# Patient Record
Sex: MALE | Race: BLACK OR AFRICAN AMERICAN | NOT HISPANIC OR LATINO | URBAN - METROPOLITAN AREA
[De-identification: names, ages, dates, MRNs, and addresses within clinical notes are randomized per-mention and may not be internally consistent; named-entity substitution may affect disease eponyms.]

---

## 2017-05-08 ENCOUNTER — EMERGENCY (EMERGENCY)
Facility: HOSPITAL | Age: 22
LOS: 1 days | Discharge: HOME | End: 2017-05-08
Admitting: EMERGENCY MEDICINE
Payer: MEDICAID

## 2017-05-08 VITALS
SYSTOLIC BLOOD PRESSURE: 140 MMHG | TEMPERATURE: 98 F | OXYGEN SATURATION: 100 % | HEART RATE: 52 BPM | DIASTOLIC BLOOD PRESSURE: 86 MMHG | RESPIRATION RATE: 16 BRPM

## 2017-05-08 PROCEDURE — 99283 EMERGENCY DEPT VISIT LOW MDM: CPT | Mod: 25

## 2017-05-08 RX ORDER — OXYCODONE HYDROCHLORIDE 5 MG/1
1 TABLET ORAL
Qty: 6 | Refills: 0 | OUTPATIENT
Start: 2017-05-08 | End: 2017-05-10

## 2017-05-08 NOTE — ED ADULT TRIAGE NOTE - CHIEF COMPLAINT QUOTE
pt is supposed to have root canal on right side of mouth, c.o pain to area x3 days. was going to go to dentist today but could not wait. no pmh.

## 2017-05-08 NOTE — ED PROVIDER NOTE - PLAN OF CARE
pls rest, drink plenty of fluids, take your pain meds as prescribed, do not take before driving as it will make you drowsy, f/u with the dental clinic [phone], return for any worsening symptoms or any other concerning symptoms

## 2017-05-08 NOTE — ED PROVIDER NOTE - CARE PLAN
Principal Discharge DX:	Tooth pain  Instructions for follow-up, activity and diet:	pls rest, drink plenty of fluids, take your pain meds as prescribed, do not take before driving as it will make you drowsy, f/u with the dental clinic [phone], return for any worsening symptoms or any other concerning symptoms Principal Discharge DX:	Tooth pain  Instructions for follow-up, activity and diet:	pls rest, drink plenty of fluids, take your pain meds as prescribed, do not take before driving as it will make you drowsy, f/u with the dental clinic [phone], return for any worsening symptoms or any other concerning symptoms Principal Discharge DX:	Tooth pain  Instructions for follow-up, activity and diet:	pls rest, drink plenty of fluids, take your pain meds as prescribed, do not take before driving as it will make you drowsy, f/u with the dental clinic [phone], return for any worsening symptoms or any other concerning symptoms

## 2017-05-08 NOTE — ED PROVIDER NOTE - OBJECTIVE STATEMENT
20 y/o male no pmh presents with right lower tooth pain, x 2 days, states that he was in middle of a root canal was supposed to return 1 month ago to finish, but he missed his appointment, 20 y/o male no pmh presents with right lower tooth pain, x 2 days, states that he was in middle of a root canal was supposed to return 1 month ago to finish, but he missed his appointment, and now has severe pain to tooth area denies any trauma, headaches, 20 y/o male no pmh presents with right lower tooth pain, x 2 days, states that he was in middle of a root canal was supposed to return 1 month ago to finish, but he missed his appointment, and now has severe pain to tooth area denies any trauma, headaches, neck pain, f/c/n/v/d, chest pain, sob, abdominal pain, urinary symptoms, numbness/weakness/tingling, diff swallowing/breathing, recent travel, sic contacts, social history.

## 2017-05-09 ENCOUNTER — EMERGENCY (EMERGENCY)
Facility: HOSPITAL | Age: 22
LOS: 1 days | Discharge: HOME | End: 2017-05-09
Attending: EMERGENCY MEDICINE | Admitting: EMERGENCY MEDICINE
Payer: MEDICAID

## 2017-05-09 VITALS
RESPIRATION RATE: 20 BRPM | SYSTOLIC BLOOD PRESSURE: 137 MMHG | OXYGEN SATURATION: 100 % | HEART RATE: 85 BPM | TEMPERATURE: 99 F | DIASTOLIC BLOOD PRESSURE: 79 MMHG

## 2017-05-09 PROCEDURE — 99282 EMERGENCY DEPT VISIT SF MDM: CPT

## 2017-05-09 NOTE — ED PROVIDER NOTE - MEDICAL DECISION MAKING DETAILS
Patient told he would not receive any more narcotics and to use Advil every 6 hours. Needs to follow up with dental. Patient told he would not receive any more narcotics and to use Advil every 6 hours. Needs to follow up with dental.  then walked out w/o receiving dc instructions

## 2017-05-09 NOTE — ED PROVIDER NOTE - NS ED MD SCRIBE ATTENDING SCRIBE SECTIONS
HISTORY OF PRESENT ILLNESS/HIV/VITAL SIGNS( Pullset)/REVIEW OF SYSTEMS/DISPOSITION/PHYSICAL EXAM/PAST MEDICAL/SURGICAL/SOCIAL HISTORY

## 2017-05-09 NOTE — ED PROVIDER NOTE - OBJECTIVE STATEMENT
22 y/o M pt presenting with dental pain x1 month worsened 2 days ago. Was evaluated in [facility] yesterday and prescribed Percocet. States going to the dentist today morning and was referred to another dentist. Patient returned here asking for more percocet. tooth pain for over 1 month.

## 2017-05-09 NOTE — ED ADULT TRIAGE NOTE - CHIEF COMPLAINT QUOTE
c/o toothache right side of mouth pt was treated in ER two night ago for same pain pt has follow up appointment in one week pt asking for pain meds until he sees dental specialist

## 2017-05-09 NOTE — ED PROVIDER NOTE - DETAILS:
The scribe's documentation has been prepared under my direction and personally reviewed by me in its entirety. I confirm that the note above accurately reflects all work, treatment, procedures, and medical decision making performed by Mikey Zhao MD

## 2017-08-22 ENCOUNTER — INPATIENT (INPATIENT)
Facility: HOSPITAL | Age: 22
LOS: 7 days | Discharge: HOME | End: 2017-08-30
Attending: PSYCHIATRY & NEUROLOGY | Admitting: PSYCHIATRY & NEUROLOGY
Payer: MEDICAID

## 2017-08-22 VITALS
DIASTOLIC BLOOD PRESSURE: 82 MMHG | SYSTOLIC BLOOD PRESSURE: 127 MMHG | RESPIRATION RATE: 16 BRPM | TEMPERATURE: 99 F | HEART RATE: 72 BPM

## 2017-08-22 NOTE — ED ADULT TRIAGE NOTE - CHIEF COMPLAINT QUOTE
pt brought in by family for "not acting right" for two weeks. family has seen him taking unknown pills. pt has been paranoid, agitated and confrontational. has been posting "weird stuff" on social media. pt states he smoked weed prior to arrival. denies any other drugs or alcohol. pt stating "give me some percocet, I just need percocet". pt denies any SI/HI.  NP called, pt taken to  by PES.

## 2017-08-23 DIAGNOSIS — F29 UNSPECIFIED PSYCHOSIS NOT DUE TO A SUBSTANCE OR KNOWN PHYSIOLOGICAL CONDITION: ICD-10-CM

## 2017-08-23 DIAGNOSIS — F12.10 CANNABIS ABUSE, UNCOMPLICATED: ICD-10-CM

## 2017-08-23 LAB
ALBUMIN SERPL ELPH-MCNC: 4.1 G/DL — SIGNIFICANT CHANGE UP (ref 3.3–5)
ALP SERPL-CCNC: 81 U/L — SIGNIFICANT CHANGE UP (ref 60–270)
ALT FLD-CCNC: 22 U/L — SIGNIFICANT CHANGE UP (ref 4–41)
APAP SERPL-MCNC: < 15 UG/ML — LOW (ref 15–25)
AST SERPL-CCNC: 33 U/L — SIGNIFICANT CHANGE UP (ref 4–40)
BARBITURATES MEASUREMENT: NEGATIVE — SIGNIFICANT CHANGE UP
BASOPHILS # BLD AUTO: 0.03 K/UL — SIGNIFICANT CHANGE UP (ref 0–0.2)
BASOPHILS NFR BLD AUTO: 0.5 % — SIGNIFICANT CHANGE UP (ref 0–2)
BENZODIAZ SERPL-MCNC: NEGATIVE — SIGNIFICANT CHANGE UP
BILIRUB SERPL-MCNC: 0.2 MG/DL — SIGNIFICANT CHANGE UP (ref 0.2–1.2)
BUN SERPL-MCNC: 13 MG/DL — SIGNIFICANT CHANGE UP (ref 7–23)
CALCIUM SERPL-MCNC: 9 MG/DL — SIGNIFICANT CHANGE UP (ref 8.4–10.5)
CHLORIDE SERPL-SCNC: 104 MMOL/L — SIGNIFICANT CHANGE UP (ref 98–107)
CO2 SERPL-SCNC: 25 MMOL/L — SIGNIFICANT CHANGE UP (ref 22–31)
CREAT SERPL-MCNC: 1.01 MG/DL — SIGNIFICANT CHANGE UP (ref 0.5–1.3)
EOSINOPHIL # BLD AUTO: 0.21 K/UL — SIGNIFICANT CHANGE UP (ref 0–0.5)
EOSINOPHIL NFR BLD AUTO: 3.5 % — SIGNIFICANT CHANGE UP (ref 0–6)
ETHANOL BLD-MCNC: < 10 MG/DL — SIGNIFICANT CHANGE UP
GLUCOSE SERPL-MCNC: 101 MG/DL — HIGH (ref 70–99)
HCT VFR BLD CALC: 38.9 % — LOW (ref 39–50)
HGB BLD-MCNC: 13 G/DL — SIGNIFICANT CHANGE UP (ref 13–17)
IMM GRANULOCYTES # BLD AUTO: 0.01 # — SIGNIFICANT CHANGE UP
IMM GRANULOCYTES NFR BLD AUTO: 0.2 % — SIGNIFICANT CHANGE UP (ref 0–1.5)
LYMPHOCYTES # BLD AUTO: 2.32 K/UL — SIGNIFICANT CHANGE UP (ref 1–3.3)
LYMPHOCYTES # BLD AUTO: 38.9 % — SIGNIFICANT CHANGE UP (ref 13–44)
MCHC RBC-ENTMCNC: 28.1 PG — SIGNIFICANT CHANGE UP (ref 27–34)
MCHC RBC-ENTMCNC: 33.4 % — SIGNIFICANT CHANGE UP (ref 32–36)
MCV RBC AUTO: 84.2 FL — SIGNIFICANT CHANGE UP (ref 80–100)
MONOCYTES # BLD AUTO: 0.44 K/UL — SIGNIFICANT CHANGE UP (ref 0–0.9)
MONOCYTES NFR BLD AUTO: 7.4 % — SIGNIFICANT CHANGE UP (ref 2–14)
NEUTROPHILS # BLD AUTO: 2.96 K/UL — SIGNIFICANT CHANGE UP (ref 1.8–7.4)
NEUTROPHILS NFR BLD AUTO: 49.5 % — SIGNIFICANT CHANGE UP (ref 43–77)
NRBC # FLD: 0 — SIGNIFICANT CHANGE UP
PLATELET # BLD AUTO: 187 K/UL — SIGNIFICANT CHANGE UP (ref 150–400)
PMV BLD: 10.1 FL — SIGNIFICANT CHANGE UP (ref 7–13)
POTASSIUM SERPL-MCNC: 3.8 MMOL/L — SIGNIFICANT CHANGE UP (ref 3.5–5.3)
POTASSIUM SERPL-SCNC: 3.8 MMOL/L — SIGNIFICANT CHANGE UP (ref 3.5–5.3)
PROT SERPL-MCNC: 6.5 G/DL — SIGNIFICANT CHANGE UP (ref 6–8.3)
RBC # BLD: 4.62 M/UL — SIGNIFICANT CHANGE UP (ref 4.2–5.8)
RBC # FLD: 12.7 % — SIGNIFICANT CHANGE UP (ref 10.3–14.5)
SALICYLATES SERPL-MCNC: < 5 MG/DL — LOW (ref 15–30)
SODIUM SERPL-SCNC: 141 MMOL/L — SIGNIFICANT CHANGE UP (ref 135–145)
TSH SERPL-MCNC: 0.63 UIU/ML — SIGNIFICANT CHANGE UP (ref 0.5–4.3)
WBC # BLD: 5.97 K/UL — SIGNIFICANT CHANGE UP (ref 3.8–10.5)
WBC # FLD AUTO: 5.97 K/UL — SIGNIFICANT CHANGE UP (ref 3.8–10.5)

## 2017-08-23 PROCEDURE — 99285 EMERGENCY DEPT VISIT HI MDM: CPT

## 2017-08-23 PROCEDURE — 99223 1ST HOSP IP/OBS HIGH 75: CPT

## 2017-08-23 RX ORDER — DIPHENHYDRAMINE HCL 50 MG
50 CAPSULE ORAL EVERY 6 HOURS
Qty: 0 | Refills: 0 | Status: DISCONTINUED | OUTPATIENT
Start: 2017-08-23 | End: 2017-08-30

## 2017-08-23 RX ORDER — DIPHENHYDRAMINE HCL 50 MG
50 CAPSULE ORAL ONCE
Qty: 0 | Refills: 0 | Status: DISCONTINUED | OUTPATIENT
Start: 2017-08-23 | End: 2017-08-30

## 2017-08-23 RX ORDER — HALOPERIDOL DECANOATE 100 MG/ML
5 INJECTION INTRAMUSCULAR ONCE
Qty: 0 | Refills: 0 | Status: DISCONTINUED | OUTPATIENT
Start: 2017-08-23 | End: 2017-08-30

## 2017-08-23 RX ORDER — RISPERIDONE 4 MG/1
1 TABLET ORAL DAILY
Qty: 0 | Refills: 0 | Status: DISCONTINUED | OUTPATIENT
Start: 2017-08-23 | End: 2017-08-24

## 2017-08-23 RX ORDER — HALOPERIDOL DECANOATE 100 MG/ML
5 INJECTION INTRAMUSCULAR ONCE
Qty: 0 | Refills: 0 | Status: COMPLETED | OUTPATIENT
Start: 2017-08-23 | End: 2017-08-23

## 2017-08-23 RX ORDER — HALOPERIDOL DECANOATE 100 MG/ML
5 INJECTION INTRAMUSCULAR EVERY 6 HOURS
Qty: 0 | Refills: 0 | Status: DISCONTINUED | OUTPATIENT
Start: 2017-08-23 | End: 2017-08-30

## 2017-08-23 RX ADMIN — Medication 2 MILLIGRAM(S): at 08:26

## 2017-08-23 RX ADMIN — HALOPERIDOL DECANOATE 5 MILLIGRAM(S): 100 INJECTION INTRAMUSCULAR at 08:26

## 2017-08-23 RX ADMIN — HALOPERIDOL DECANOATE 5 MILLIGRAM(S): 100 INJECTION INTRAMUSCULAR at 19:50

## 2017-08-23 RX ADMIN — HALOPERIDOL DECANOATE 5 MILLIGRAM(S): 100 INJECTION INTRAMUSCULAR at 02:45

## 2017-08-23 RX ADMIN — Medication 2 MILLIGRAM(S): at 19:50

## 2017-08-23 RX ADMIN — Medication 2 MILLIGRAM(S): at 02:45

## 2017-08-23 RX ADMIN — RISPERIDONE 1 MILLIGRAM(S): 4 TABLET ORAL at 08:21

## 2017-08-23 RX ADMIN — Medication 50 MILLIGRAM(S): at 19:50

## 2017-08-23 RX ADMIN — Medication 50 MILLIGRAM(S): at 08:26

## 2017-08-23 NOTE — ED BEHAVIORAL HEALTH ASSESSMENT NOTE - PSYCHIATRIC ISSUES AND PLAN (INCLUDE STANDING AND PRN MEDICATION)
Start Risperdal 1mg daily. May utilize PRNS: haldol 5mg, ativan 2mg, diphenhydramine 50mg, PO/IM, Q6H for Agitation

## 2017-08-23 NOTE — ED BEHAVIORAL HEALTH ASSESSMENT NOTE - CASE SUMMARY
20 y/o male with a recent stay at [facility] under unclear circumstances and without clear diagnosis, + marijuana and illicit pill abuse (assumed to be percocet but likely laced or other substance), brought in by dad and friends, presenting with bizarre behavior X2 weeks, in the context of substance abuse.    Patient with disorganized TP, delusions and paranoia, labile mood and affect with threatening behaviors in the context of psychosis. He has been exhibiting these behaviors at home for about two weeks, possibly as the result of a laced drug but a first break of psychosis cannot be ruled out at this time. Patient requires hospitalization for safety and stabilization as he is an acute risk to himself and others. Plan as above.

## 2017-08-23 NOTE — ED PROVIDER NOTE - CARE PLAN
Principal Discharge DX:	Marijuana abuse Principal Discharge DX:	Marijuana abuse  Secondary Diagnosis:	Psychosis
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
## 2017-08-23 NOTE — ED PROVIDER NOTE - OBJECTIVE STATEMENT
21M unknown PMH per triage note "pt brought in by family for "not acting right" for two weeks. family has seen him taking unknown pills. pt has been paranoid, agitated and confrontational. has been posting "weird stuff" on social media. pt states he smoked weed prior to arrival. denies any other drugs or alcohol. pt stating "give me some percocet, I just need percocet"  Pt answering "I don't know" to most questions. Not fully cooperative. Per pt, his dad drove him here bec dad thinks that pt is "laced out on drugs." Pt admits to marijuana, denies etoh or other drug use. Denies f/c, SOB/CP, cough/rhinorrhea, abd pain, NVD, SI/HI.   PEr NP rapid assessment - pt was recently dc'd from [facility].

## 2017-08-23 NOTE — ED BEHAVIORAL HEALTH ASSESSMENT NOTE - RISK ASSESSMENT
Risk factors: Single, male, psychosis with delusions and paranoia, impulsivity, threatening behaviors, substance abuse, occupational decline, absence of outpatient follow-up, limited insight into symptoms, poor reactivity to stressors, difficulty expressing thoughts/emotions, low frustration tolerance.

## 2017-08-23 NOTE — ED PROVIDER NOTE - PHYSICAL EXAMINATION
pt not compliant w/ full neuro exam (i.e. EOM).  FROM all extremities, strength 5/5  No clonus, rigidity, tremors, fasciculations. PERRL.

## 2017-08-23 NOTE — ED BEHAVIORAL HEALTH ASSESSMENT NOTE - SUMMARY
Patient is a 21 year old male, domiciled, unemployed, non-caregiver, with a recent stay at [facility] under unclear circumstances and without clear diagnosis, no current outpatient treatment, no known hx of self harm behaviors, denies prior suicide attempts, denies hx of violence or arrests, denies trauma, + marijuana and illicit pill abuse (assumed to be percocet but likely laced or other substance), with no relevant past medical history, brought in by dad and friends, presenting with bizarre behavior X2 weeks, in the context of substance abuse.    Patient with disorganized TP, delusions and paranoia, labile mood and affect with threatening behaviors in the context of psychosis. He has been exhibiting these behaviors at home for about two weeks, possibly as the result of a laced drug but a first break of psychosis cannot be ruled out at this time. Patient requires hospitalization for safety and stabilization as he is an acute risk to himself and others.

## 2017-08-23 NOTE — ED PROVIDER NOTE - PROGRESS NOTE DETAILS
labs grossly wnl, medically cleared for [facility] admission.   (had required sedation for agitation)

## 2017-08-23 NOTE — ED BEHAVIORAL HEALTH ASSESSMENT NOTE - DETAILS
[place] CPEP X2 days unable to reach at this time No physical aggression, however has been posturing and threatening for 2 weeks Deferred due to psychosis and pt's presentation LILLY Dolan Friends and father made aware

## 2017-08-23 NOTE — ED BEHAVIORAL HEALTH ASSESSMENT NOTE - OTHER
Friends Substance abuse Labile - cooperative for periods of time and then hostile/psychotic Varying - At times very soft/quiet and later loud Waxing/Waning At times loose but other times normal Yuea Patient endorsed AH to medical NP, however denied to writer. Unclear Not fully assessed, grossly normal [id number]

## 2017-08-23 NOTE — ED BEHAVIORAL HEALTH ASSESSMENT NOTE - SUICIDE RISK FACTORS
Substance abuse/dependence/Agitation/severe anxiety/Unable to engage in safety planning/Highly impulsive behavior/Access to means (pills, firearms, etc.)

## 2017-08-23 NOTE — ED BEHAVIORAL HEALTH NOTE - BEHAVIORAL HEALTH NOTE
Patient requesting to leave ER. When told that he would not be able to leave, patient got agitated, confrontational, clenching fists, clenching jaw, staring menacingly at writer and appearing threatening. Attempted to de-escalate verbally but was unsuccessful. Security and additional personnel called and patient was placed in restraints and given Ativan 2mg/Haldol 5mg IM X1 for acute agitation and psychosis. Patient requesting to leave ER. When told that he would not be able to leave, patient got agitated, confrontational, clenching fists, clenching jaw, staring menacingly at writer and appearing threatening. Attempted to de-escalate verbally but was unsuccessful. Security and additional personnel called and patient was placed in restraints and given Ativan 2mg/Haldol 5mg IM X1 for acute agitation and psychosis. Patient remained in restraints from 0242 until 0327, at which time he was calm and cooperative and no longer required restraint.

## 2017-08-24 PROCEDURE — 99233 SBSQ HOSP IP/OBS HIGH 50: CPT

## 2017-08-24 RX ORDER — BENZTROPINE MESYLATE 1 MG
1 TABLET ORAL
Qty: 0 | Refills: 0 | Status: DISCONTINUED | OUTPATIENT
Start: 2017-08-24 | End: 2017-08-30

## 2017-08-24 RX ORDER — RISPERIDONE 4 MG/1
2 TABLET ORAL
Qty: 0 | Refills: 0 | Status: DISCONTINUED | OUTPATIENT
Start: 2017-08-24 | End: 2017-08-30

## 2017-08-24 RX ADMIN — Medication 2 MILLIGRAM(S): at 16:04

## 2017-08-24 RX ADMIN — Medication 50 MILLIGRAM(S): at 21:12

## 2017-08-24 RX ADMIN — RISPERIDONE 1 MILLIGRAM(S): 4 TABLET ORAL at 08:47

## 2017-08-24 RX ADMIN — HALOPERIDOL DECANOATE 5 MILLIGRAM(S): 100 INJECTION INTRAMUSCULAR at 21:12

## 2017-08-24 RX ADMIN — RISPERIDONE 2 MILLIGRAM(S): 4 TABLET ORAL at 21:12

## 2017-08-24 RX ADMIN — Medication 1 MILLIGRAM(S): at 21:12

## 2017-08-25 PROCEDURE — 99232 SBSQ HOSP IP/OBS MODERATE 35: CPT

## 2017-08-25 RX ORDER — HALOPERIDOL DECANOATE 100 MG/ML
5 INJECTION INTRAMUSCULAR ONCE
Qty: 0 | Refills: 0 | Status: COMPLETED | OUTPATIENT
Start: 2017-08-25 | End: 2017-08-25

## 2017-08-25 RX ORDER — DIPHENHYDRAMINE HCL 50 MG
50 CAPSULE ORAL ONCE
Qty: 0 | Refills: 0 | Status: COMPLETED | OUTPATIENT
Start: 2017-08-25 | End: 2017-08-25

## 2017-08-25 RX ORDER — DIPHENHYDRAMINE HCL 50 MG
25 CAPSULE ORAL ONCE
Qty: 0 | Refills: 0 | Status: COMPLETED | OUTPATIENT
Start: 2017-08-25 | End: 2017-08-27

## 2017-08-25 RX ORDER — ACETAMINOPHEN 500 MG
650 TABLET ORAL ONCE
Qty: 0 | Refills: 0 | Status: COMPLETED | OUTPATIENT
Start: 2017-08-25 | End: 2017-08-25

## 2017-08-25 RX ADMIN — Medication 2 MILLIGRAM(S): at 11:03

## 2017-08-25 RX ADMIN — Medication 650 MILLIGRAM(S): at 23:22

## 2017-08-25 RX ADMIN — RISPERIDONE 2 MILLIGRAM(S): 4 TABLET ORAL at 11:03

## 2017-08-25 RX ADMIN — HALOPERIDOL DECANOATE 5 MILLIGRAM(S): 100 INJECTION INTRAMUSCULAR at 11:03

## 2017-08-25 RX ADMIN — HALOPERIDOL DECANOATE 5 MILLIGRAM(S): 100 INJECTION INTRAMUSCULAR at 20:00

## 2017-08-25 RX ADMIN — Medication 1 MILLIGRAM(S): at 11:03

## 2017-08-25 RX ADMIN — Medication 2 MILLIGRAM(S): at 20:00

## 2017-08-25 RX ADMIN — Medication 1 MILLIGRAM(S): at 20:28

## 2017-08-25 RX ADMIN — Medication 50 MILLIGRAM(S): at 20:00

## 2017-08-25 RX ADMIN — Medication 50 MILLIGRAM(S): at 11:03

## 2017-08-25 RX ADMIN — Medication 2 MILLIGRAM(S): at 01:35

## 2017-08-25 RX ADMIN — Medication 650 MILLIGRAM(S): at 22:30

## 2017-08-25 RX ADMIN — RISPERIDONE 2 MILLIGRAM(S): 4 TABLET ORAL at 20:28

## 2017-08-26 RX ADMIN — Medication 50 MILLIGRAM(S): at 23:00

## 2017-08-26 RX ADMIN — Medication 2 MILLIGRAM(S): at 16:41

## 2017-08-26 RX ADMIN — Medication 50 MILLIGRAM(S): at 04:10

## 2017-08-26 RX ADMIN — Medication 2 MILLIGRAM(S): at 04:10

## 2017-08-26 RX ADMIN — HALOPERIDOL DECANOATE 5 MILLIGRAM(S): 100 INJECTION INTRAMUSCULAR at 16:41

## 2017-08-26 RX ADMIN — Medication 2 MILLIGRAM(S): at 23:00

## 2017-08-26 RX ADMIN — Medication 1 MILLIGRAM(S): at 09:29

## 2017-08-26 RX ADMIN — HALOPERIDOL DECANOATE 5 MILLIGRAM(S): 100 INJECTION INTRAMUSCULAR at 23:00

## 2017-08-26 RX ADMIN — HALOPERIDOL DECANOATE 5 MILLIGRAM(S): 100 INJECTION INTRAMUSCULAR at 04:10

## 2017-08-26 RX ADMIN — Medication 1 MILLIGRAM(S): at 20:14

## 2017-08-26 RX ADMIN — RISPERIDONE 2 MILLIGRAM(S): 4 TABLET ORAL at 20:14

## 2017-08-26 RX ADMIN — Medication 50 MILLIGRAM(S): at 16:41

## 2017-08-26 RX ADMIN — RISPERIDONE 2 MILLIGRAM(S): 4 TABLET ORAL at 09:29

## 2017-08-27 RX ORDER — HALOPERIDOL DECANOATE 100 MG/ML
5 INJECTION INTRAMUSCULAR ONCE
Qty: 0 | Refills: 0 | Status: COMPLETED | OUTPATIENT
Start: 2017-08-27 | End: 2017-08-27

## 2017-08-27 RX ORDER — DIPHENHYDRAMINE HCL 50 MG
50 CAPSULE ORAL ONCE
Qty: 0 | Refills: 0 | Status: COMPLETED | OUTPATIENT
Start: 2017-08-27 | End: 2017-08-27

## 2017-08-27 RX ADMIN — Medication 2 MILLIGRAM(S): at 20:10

## 2017-08-27 RX ADMIN — HALOPERIDOL DECANOATE 5 MILLIGRAM(S): 100 INJECTION INTRAMUSCULAR at 20:10

## 2017-08-27 RX ADMIN — HALOPERIDOL DECANOATE 5 MILLIGRAM(S): 100 INJECTION INTRAMUSCULAR at 19:30

## 2017-08-27 RX ADMIN — Medication 25 MILLIGRAM(S): at 19:29

## 2017-08-27 RX ADMIN — HALOPERIDOL DECANOATE 5 MILLIGRAM(S): 100 INJECTION INTRAMUSCULAR at 08:10

## 2017-08-27 RX ADMIN — Medication 1 MILLIGRAM(S): at 21:32

## 2017-08-27 RX ADMIN — RISPERIDONE 2 MILLIGRAM(S): 4 TABLET ORAL at 21:32

## 2017-08-27 RX ADMIN — RISPERIDONE 2 MILLIGRAM(S): 4 TABLET ORAL at 09:26

## 2017-08-27 RX ADMIN — Medication 50 MILLIGRAM(S): at 08:10

## 2017-08-27 RX ADMIN — Medication 50 MILLIGRAM(S): at 20:10

## 2017-08-27 RX ADMIN — Medication 1 MILLIGRAM(S): at 09:26

## 2017-08-27 RX ADMIN — Medication 2 MILLIGRAM(S): at 08:10

## 2017-08-27 RX ADMIN — Medication 2 MILLIGRAM(S): at 19:31

## 2017-08-28 PROCEDURE — 99232 SBSQ HOSP IP/OBS MODERATE 35: CPT

## 2017-08-28 RX ADMIN — HALOPERIDOL DECANOATE 5 MILLIGRAM(S): 100 INJECTION INTRAMUSCULAR at 08:50

## 2017-08-28 RX ADMIN — Medication 1 MILLIGRAM(S): at 08:50

## 2017-08-28 RX ADMIN — HALOPERIDOL DECANOATE 5 MILLIGRAM(S): 100 INJECTION INTRAMUSCULAR at 20:25

## 2017-08-28 RX ADMIN — Medication 2 MILLIGRAM(S): at 08:50

## 2017-08-28 RX ADMIN — Medication 2 MILLIGRAM(S): at 20:25

## 2017-08-28 RX ADMIN — Medication 50 MILLIGRAM(S): at 08:50

## 2017-08-28 RX ADMIN — RISPERIDONE 2 MILLIGRAM(S): 4 TABLET ORAL at 20:25

## 2017-08-28 RX ADMIN — RISPERIDONE 2 MILLIGRAM(S): 4 TABLET ORAL at 08:50

## 2017-08-28 RX ADMIN — Medication 50 MILLIGRAM(S): at 20:25

## 2017-08-28 RX ADMIN — Medication 1 MILLIGRAM(S): at 20:25

## 2017-08-29 PROCEDURE — 99232 SBSQ HOSP IP/OBS MODERATE 35: CPT

## 2017-08-29 RX ADMIN — Medication 50 MILLIGRAM(S): at 20:13

## 2017-08-29 RX ADMIN — HALOPERIDOL DECANOATE 5 MILLIGRAM(S): 100 INJECTION INTRAMUSCULAR at 20:13

## 2017-08-29 RX ADMIN — RISPERIDONE 2 MILLIGRAM(S): 4 TABLET ORAL at 09:50

## 2017-08-29 RX ADMIN — Medication 1 MILLIGRAM(S): at 20:13

## 2017-08-29 RX ADMIN — Medication 2 MILLIGRAM(S): at 20:13

## 2017-08-29 RX ADMIN — Medication 1 MILLIGRAM(S): at 09:50

## 2017-08-29 RX ADMIN — RISPERIDONE 2 MILLIGRAM(S): 4 TABLET ORAL at 20:13

## 2017-08-30 VITALS — TEMPERATURE: 98 F

## 2017-08-30 PROCEDURE — 99238 HOSP IP/OBS DSCHRG MGMT 30/<: CPT

## 2017-08-30 RX ORDER — BENZTROPINE MESYLATE 1 MG
1 TABLET ORAL
Qty: 28 | Refills: 0 | OUTPATIENT
Start: 2017-08-30 | End: 2017-09-13

## 2017-08-30 RX ORDER — RISPERIDONE 4 MG/1
1 TABLET ORAL
Qty: 28 | Refills: 0 | OUTPATIENT
Start: 2017-08-30 | End: 2017-09-13

## 2017-08-30 RX ADMIN — Medication 1 MILLIGRAM(S): at 08:58

## 2017-08-30 RX ADMIN — RISPERIDONE 2 MILLIGRAM(S): 4 TABLET ORAL at 08:58

## 2017-09-05 ENCOUNTER — EMERGENCY (EMERGENCY)
Facility: HOSPITAL | Age: 22
LOS: 1 days | Discharge: HOME | End: 2017-09-05
Admitting: EMERGENCY MEDICINE
Payer: MEDICAID

## 2017-09-05 VITALS
DIASTOLIC BLOOD PRESSURE: 77 MMHG | SYSTOLIC BLOOD PRESSURE: 113 MMHG | TEMPERATURE: 98 F | OXYGEN SATURATION: 100 % | RESPIRATION RATE: 18 BRPM | HEART RATE: 70 BPM

## 2017-09-05 LAB
ALBUMIN SERPL ELPH-MCNC: 4 G/DL — SIGNIFICANT CHANGE UP (ref 3.3–5)
ALP SERPL-CCNC: 54 U/L — LOW (ref 60–270)
ALT FLD-CCNC: 43 U/L — HIGH (ref 4–41)
APAP SERPL-MCNC: < 15 UG/ML — LOW (ref 15–25)
AST SERPL-CCNC: 26 U/L — SIGNIFICANT CHANGE UP (ref 4–40)
BARBITURATES MEASUREMENT: NEGATIVE — SIGNIFICANT CHANGE UP
BASOPHILS # BLD AUTO: 0.02 K/UL — SIGNIFICANT CHANGE UP (ref 0–0.2)
BASOPHILS NFR BLD AUTO: 0.4 % — SIGNIFICANT CHANGE UP (ref 0–2)
BENZODIAZ SERPL-MCNC: NEGATIVE — SIGNIFICANT CHANGE UP
BILIRUB SERPL-MCNC: 0.2 MG/DL — SIGNIFICANT CHANGE UP (ref 0.2–1.2)
BUN SERPL-MCNC: 14 MG/DL — SIGNIFICANT CHANGE UP (ref 7–23)
CALCIUM SERPL-MCNC: 9.3 MG/DL — SIGNIFICANT CHANGE UP (ref 8.4–10.5)
CHLORIDE SERPL-SCNC: 104 MMOL/L — SIGNIFICANT CHANGE UP (ref 98–107)
CO2 SERPL-SCNC: 25 MMOL/L — SIGNIFICANT CHANGE UP (ref 22–31)
CREAT SERPL-MCNC: 0.92 MG/DL — SIGNIFICANT CHANGE UP (ref 0.5–1.3)
EOSINOPHIL # BLD AUTO: 0.18 K/UL — SIGNIFICANT CHANGE UP (ref 0–0.5)
EOSINOPHIL NFR BLD AUTO: 3.2 % — SIGNIFICANT CHANGE UP (ref 0–6)
ETHANOL BLD-MCNC: < 10 MG/DL — SIGNIFICANT CHANGE UP
GLUCOSE SERPL-MCNC: 82 MG/DL — SIGNIFICANT CHANGE UP (ref 70–99)
HCT VFR BLD CALC: 39.1 % — SIGNIFICANT CHANGE UP (ref 39–50)
HGB BLD-MCNC: 12.8 G/DL — LOW (ref 13–17)
IMM GRANULOCYTES # BLD AUTO: 0.01 # — SIGNIFICANT CHANGE UP
IMM GRANULOCYTES NFR BLD AUTO: 0.2 % — SIGNIFICANT CHANGE UP (ref 0–1.5)
LYMPHOCYTES # BLD AUTO: 1.95 K/UL — SIGNIFICANT CHANGE UP (ref 1–3.3)
LYMPHOCYTES # BLD AUTO: 34.9 % — SIGNIFICANT CHANGE UP (ref 13–44)
MCHC RBC-ENTMCNC: 28.1 PG — SIGNIFICANT CHANGE UP (ref 27–34)
MCHC RBC-ENTMCNC: 32.7 % — SIGNIFICANT CHANGE UP (ref 32–36)
MCV RBC AUTO: 85.9 FL — SIGNIFICANT CHANGE UP (ref 80–100)
MONOCYTES # BLD AUTO: 0.57 K/UL — SIGNIFICANT CHANGE UP (ref 0–0.9)
MONOCYTES NFR BLD AUTO: 10.2 % — SIGNIFICANT CHANGE UP (ref 2–14)
NEUTROPHILS # BLD AUTO: 2.85 K/UL — SIGNIFICANT CHANGE UP (ref 1.8–7.4)
NEUTROPHILS NFR BLD AUTO: 51.1 % — SIGNIFICANT CHANGE UP (ref 43–77)
NRBC # FLD: 0 — SIGNIFICANT CHANGE UP
PLATELET # BLD AUTO: 181 K/UL — SIGNIFICANT CHANGE UP (ref 150–400)
PMV BLD: 9.7 FL — SIGNIFICANT CHANGE UP (ref 7–13)
POTASSIUM SERPL-MCNC: 4 MMOL/L — SIGNIFICANT CHANGE UP (ref 3.5–5.3)
POTASSIUM SERPL-SCNC: 4 MMOL/L — SIGNIFICANT CHANGE UP (ref 3.5–5.3)
PROT SERPL-MCNC: 6.6 G/DL — SIGNIFICANT CHANGE UP (ref 6–8.3)
RBC # BLD: 4.55 M/UL — SIGNIFICANT CHANGE UP (ref 4.2–5.8)
RBC # FLD: 12.8 % — SIGNIFICANT CHANGE UP (ref 10.3–14.5)
SALICYLATES SERPL-MCNC: < 5 MG/DL — LOW (ref 15–30)
SODIUM SERPL-SCNC: 142 MMOL/L — SIGNIFICANT CHANGE UP (ref 135–145)
TSH SERPL-MCNC: 1.87 UIU/ML — SIGNIFICANT CHANGE UP (ref 0.5–4.3)
WBC # BLD: 5.58 K/UL — SIGNIFICANT CHANGE UP (ref 3.8–10.5)
WBC # FLD AUTO: 5.58 K/UL — SIGNIFICANT CHANGE UP (ref 3.8–10.5)

## 2017-09-05 PROCEDURE — 90792 PSYCH DIAG EVAL W/MED SRVCS: CPT

## 2017-09-05 PROCEDURE — 99285 EMERGENCY DEPT VISIT HI MDM: CPT

## 2017-09-05 RX ORDER — HALOPERIDOL DECANOATE 100 MG/ML
5 INJECTION INTRAMUSCULAR ONCE
Qty: 0 | Refills: 0 | Status: COMPLETED | OUTPATIENT
Start: 2017-09-05 | End: 2017-09-05

## 2017-09-05 RX ORDER — DIPHENHYDRAMINE HCL 50 MG
50 CAPSULE ORAL ONCE
Qty: 0 | Refills: 0 | Status: COMPLETED | OUTPATIENT
Start: 2017-09-05 | End: 2017-09-05

## 2017-09-05 RX ADMIN — Medication 50 MILLIGRAM(S): at 21:01

## 2017-09-05 RX ADMIN — HALOPERIDOL DECANOATE 5 MILLIGRAM(S): 100 INJECTION INTRAMUSCULAR at 21:01

## 2017-09-05 RX ADMIN — Medication 2 MILLIGRAM(S): at 21:01
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
## 2017-09-05 NOTE — ED BEHAVIORAL HEALTH ASSESSMENT NOTE - CASE SUMMARY
22 y/o male with a recent stay at [facility] under unclear circumstances and without clear diagnosis, + marijuana and illicit pill abuse (assumed to be percocet but likely laced or other substance), brought in by dad and friends, presenting with bizarre behavior X2 weeks, in the context of substance abuse.    Patient with disorganized TP, delusions and paranoia, labile mood and affect with threatening behaviors in the context of psychosis. He has been exhibiting these behaviors at home for about two weeks, possibly as the result of a laced drug but a first break of psychosis cannot be ruled out at this time. Patient requires hospitalization for safety and stabilization as he is an acute risk to himself and others. Plan as above. 20 y/o male with a recent admission at [facility] 08/23 - 08/30/17 with dx of Psychosis NOS, first outpatient appointment scheduled for tomorrow, chronic anger management issues, + recent arrest vs ticket for reckless driving and prior arrests for stealing, + marijuana and illicit pill abuse (assumed to be percocet but likely laced or other substance), brought in by dad, due to relapse of symptoms as a result of daily marijuana use and medication non-compliance.      Pt initially presented with disorganized and agitated behavior resulting in IM medications. After significant time in ER, pt awoke calm and cooperative, linear and logical, able to reality test that his previous paranoid and aggressive thoughts were illogical. Lengthy discussion had with patient that his marijuana use is likely exacerbating his symptoms and he will need to abstain. Pt states he now recognizes this as a trigger for his psychosis and states he will try to abstain from now on. Pt also recognizes that his medication compliance played a role in his decompensation, accepted PO dose of Risperdal 2mg. Pt motivated to attend his outpatient appointment today at [facility] and will discuss with them further medication instructions. Adelita has no acute safety concerns at this time and will pick patient up.

## 2017-09-05 NOTE — ED PROVIDER NOTE - PROGRESS NOTE DETAILS
[place]: Pt medically cleared, received s/o. Labs grossly wnl, slight LFT elevation (can be followed outpt). Awaiting  reassessment. [place]: Cleared by  for outpt pmd/psych f/u.

## 2017-09-05 NOTE — ED ADULT NURSE NOTE - OBJECTIVE STATEMENT
BIB father from home for reported "relapse" of psychosis. Father reports patient was evaluated in ED a couple weeks prior and was d/c to home, since then has been noncompliant with tx and smoking "a lot of marijuana". Received pt awake, agitated and disorganized. Pt requires frequent verbal redirection for poor boundaries and intrusive behaviors with staff. Denies s/i h/i.

## 2017-09-05 NOTE — ED BEHAVIORAL HEALTH ASSESSMENT NOTE - SUMMARY
Patient is a 21 year old male, domiciled, unemployed, non-caregiver, with a recent stay at [facility] under unclear circumstances and without clear diagnosis, no current outpatient treatment, no known hx of self harm behaviors, denies prior suicide attempts, denies hx of violence or arrests, denies trauma, + marijuana and illicit pill abuse (assumed to be percocet but likely laced or other substance), with no relevant past medical history, brought in by dad and friends, presenting with bizarre behavior X2 weeks, in the context of substance abuse.    Patient with disorganized TP, delusions and paranoia, labile mood and affect with threatening behaviors in the context of psychosis. He has been exhibiting these behaviors at home for about two weeks, possibly as the result of a laced drug but a first break of psychosis cannot be ruled out at this time. Patient requires hospitalization for safety and stabilization as he is an acute risk to himself and others. Patient is a 21 year old male, domiciled, unemployed, non-caregiver, with a recent at [facility] 08/23 - 08/30/17 with dx of Psychosis NOS, first outpatient appointment scheduled for tomorrow, no known hx of self harm behaviors, denies prior suicide attempts, chronic anger management issues, + recent arrest vs ticket for reckless driving and prior arrests for stealing, denies trauma, + marijuana and illicit pill abuse (assumed to be percocet but likely laced or other substance), with no relevant past medical history, brought in by dad, due to relapse in symptoms as a result of daily marijuana use and medication non-compliance.      Pt initially presented with disorganized and agitated behavior resulting in IM medications. After significant time in ER, pt awoke calm and cooperative, linear and logical, able to reality test that his previous paranoid and aggressive thoughts were illogical. Lengthy discussion had with patient that his marijuana use is likely exacerbating his symptoms and he will need to abstain. Pt states he now recognizes this as a trigger for his psychosis and states he will try to abstain from now on. Pt also recognizes that his medication compliance played a role in his decompensation, accepted PO dose of Risperdal 2mg. Pt motivated to attend his outpatient appointment today at [facility] and will discuss with them further medication instructions. Adelita has no acute safety concerns at this time and will pick patient up. Family and patient advised to return to ED or call 911 for any worsening symptoms or safety concerns.

## 2017-09-05 NOTE — ED PROVIDER NOTE - CHPI ED SYMPTOMS NEG
no disorientation/no homicidal/no hallucinations/no weakness/no suicidal/no weight loss/no change in level of consciousness/no confusion

## 2017-09-05 NOTE — ED BEHAVIORAL HEALTH ASSESSMENT NOTE - DETAILS
[facility] 08/23 - 08/30/17 Dr. Scotty Lepe No physical aggression, however has been posturing and threatening LILLY Dolan Friends and father made aware Father aware

## 2017-09-05 NOTE — ED BEHAVIORAL HEALTH ASSESSMENT NOTE - DESCRIPTION (FIRST USE, LAST USE, QUANTITY, FREQUENCY, DURATION)
Daily use Hx of illicit "Percocet" but unclear if that is what he actually took. Denies any recent use Concern for K2 although pt denies

## 2017-09-05 NOTE — ED BEHAVIORAL HEALTH ASSESSMENT NOTE - HPI (INCLUDE ILLNESS QUALITY, SEVERITY, DURATION, TIMING, CONTEXT, MODIFYING FACTORS, ASSOCIATED SIGNS AND SYMPTOMS)
Patient is a 21 year old male, domiciled, unemployed, non-caregiver, with a recent at [facility] 08/23 - 08/30/17 with dx of Psychosis NOS, first outpatient appointment scheduled for tomorrow, no known hx of self harm behaviors, denies prior suicide attempts, chronic anger management issues, + recent arrest vs ticket for reckless driving and prior arrests for stealing, denies trauma, + marijuana and illicit pill abuse (assumed to be percocet but likely laced or other substance), with no relevant past medical history, brought in by dad, due to relapse in symptoms as a result of daily marijuana use and medication non-compliance.      Pt was admitted to [facility] on 08/23 due to 1-2 weeks of disorganized TP, delusions and paranoia, labile mood and affect with threatening behaviors in the context psychosis. While admitted, pt was started on Risperdal m-tab and titrated to 2mg BID along with Cogentin. As per pt's discharge summary: "Father confirmed that pt has a very long history of anger issues, from childhood and adolescence. He confirmed that his low frustration tolerance and subsequent brief agitations are chronic and the pt's baseline. Pt has been through anger management treatment in the past. Father confirmed that the only new behaviors were periods of nonsensical speech and thought process as well as paranoia, which father felt had resolved by the time of family meeting. Pt's diagnosis therefore remains psychotic disorder not otherwise specified. While he certainly may have primary psychotic disorder i.e. schizophrenia, there is strong suspicion that recent substance use (pt states he got Percocets off street) may have led to psychotic symptoms and decreased ability to manage his chronic anger issues, therefore possible substance induced psychosis remains on differential. Antisocial personality disorder (hx arrests for stealing, impulsivity, irritability/aggressiveness) vs other specified disruptive, impulse control and conduct disorder (concern for Intermittent Explosive Disorder)."    Pt arrives today, irritable, intrusive with poor boundaries, agitated and disorganized. He required IM medications prior to psychiatric eval and therefore was too sedated initially to engage.     Spoke with pt's father - He reports that since discharge, pt has refused his medications and has been smoking marijuana daily, dad does not know of any further drug use like illicit pills or K2. He states that pt has started to engage in behaviors similar to his last episode - endorsing grandiose delusions of being rich and buying a BMW, going onto to [employer] and buying expensive items that he cannot afford ($500 Jayy slippers, a paint ball gun vs BB gun?). Pt has been paranoid, approaching people randomly, asking "you know me? you know my father?" and posturing at them. While pt has chronic anger issues, at baseline he does not instigate fights without some form of trigger. Pt's friends called dad and told him that pt was "harassing people" on facebook and posting more frequently than usual. Dad is concerned but willing to accept pt home if he clears from the psychosis after some time spent in ER.    Spoke with Dr. Lepe, pt's inpatient provider from . He confirms the above information that pt's baseline including some irritability and anger issues, however pt is not psychotic at baseline. There was a question of pt using K2 prior to last admission as he did clear relatively quickly, although not overnight. He agrees that if pt is not psychotic in AM but exhibits baseline irritability he would be ok for discharge, however if pt remains psychotic he will require re-admission. Patient is a 21 year old male, domiciled, unemployed, non-caregiver, with a recent at [facility] 08/23 - 08/30/17 with dx of Psychosis NOS, first outpatient appointment scheduled for tomorrow, no known hx of self harm behaviors, denies prior suicide attempts, chronic anger management issues, + recent arrest vs ticket for reckless driving and prior arrests for stealing, denies trauma, + marijuana and illicit pill abuse (assumed to be percocet but likely laced or other substance), with no relevant past medical history, brought in by dad, due to relapse in symptoms as a result of daily marijuana use and medication non-compliance.      Pt was admitted to [facility] on 08/23 due to 1-2 weeks of disorganized TP, delusions and paranoia, labile mood and affect with threatening behaviors in the context psychosis. While admitted, pt was started on Risperdal m-tab and titrated to 2mg BID along with Cogentin. As per pt's discharge summary: "Father confirmed that pt has a very long history of anger issues, from childhood and adolescence. He confirmed that his low frustration tolerance and subsequent brief agitations are chronic and the pt's baseline. Pt has been through anger management treatment in the past. Father confirmed that the only new behaviors were periods of nonsensical speech and thought process as well as paranoia, which father felt had resolved by the time of family meeting. Pt's diagnosis therefore remains psychotic disorder not otherwise specified. While he certainly may have primary psychotic disorder i.e. schizophrenia, there is strong suspicion that recent substance use (pt states he got Percocets off street) may have led to psychotic symptoms and decreased ability to manage his chronic anger issues, therefore possible substance induced psychosis remains on differential. Antisocial personality disorder (hx arrests for stealing, impulsivity, irritability/aggressiveness) vs other specified disruptive, impulse control and conduct disorder (concern for Intermittent Explosive Disorder)."    Pt arrives today, irritable, intrusive with poor boundaries, agitated and disorganized. He required IM medications prior to psychiatric eval and therefore was too sedated initially to engage. Pt was re-evaluated around 6am, about 11 hours after arrival and IM medications. Upon re-eval, pt was calm and cooperative. He admits to daily marijuana use and non-compliance with his medications resulting in paranoia toward one woman, Isabella whom he felt was responsible for his psychiatric issues. Pt states that he was previously having aggressive thoughts towards her as a result of this paranoia, however now denies feeling that way. He denies any current paranoia and is able to reality test that his previous thoughts were illogical. Also admits to difficulty controlling his anger whenever triggered since stopping his medications. Pt denies any other symptoms, denies any auditory/visual hallucinations, depression or suicidal ideations, intent or plans. Pt states he now realizes that without his medications he will likely end up back in the hospital and he wishes to avoid that. Pt accepted one dose of Risperdal 2mg PO this AM and is motivated to attend his outpatient appointment today at 230pm at [facility].     Spoke with pt's father - He reports that since discharge, pt has refused his medications and has been smoking marijuana daily, dad does not know of any further drug use like illicit pills or K2. He states that pt has started to engage in behaviors similar to his last episode - endorsing grandiose delusions of being rich and buying a BMW, going onto to [employer] and buying expensive items that he cannot afford ($500 Jayy slippers, a paint ball gun vs BB gun?). Pt has been paranoid, approaching people randomly, asking "you know me? you know my father?" and posturing at them. While pt has chronic anger issues, at baseline he does not instigate fights without some form of trigger. Pt's friends called dad and told him that pt was "harassing people" on facebook as well. Dad is concerned but willing to accept pt home if he clears from the psychosis after some time spent in ER. Update 0700- spoke with dad re: pt's presentation this AM and dad is comfortable taking patient home with plan to attend [facility] today. He has no acute safety concerns at this time.     Spoke with Dr. Lepe, pt's inpatient provider from . He confirms the above information that pt's baseline including some irritability and anger issues, however pt is not psychotic at baseline. There was a question of pt using K2 prior to last admission as he did clear relatively quickly, although not overnight. He agrees that if pt is not psychotic in AM but exhibits baseline irritability he would be ok for discharge, however if pt remains psychotic he will require re-admission.

## 2017-09-05 NOTE — ED ADULT TRIAGE NOTE - CHIEF COMPLAINT QUOTE
"I have psychosis and Paranoia." Patient's father reports that patient has not been himself, wants to harm others and has been demonstrating aggressive behaviors. Patient was discharged last week, patient is not compliant with medications, hearing voices, denies any suicidal ideations. "I have psychosis and Paranoia." Patient's father reports that patient has not been himself, wants to harm others and has been demonstrating aggressive behaviors. Patient was discharged last week, patient is not compliant with medications, hearing voices, denies any suicidal ideations. Patient smokes weeds, denies alcohol use.

## 2017-09-05 NOTE — ED BEHAVIORAL HEALTH ASSESSMENT NOTE - RISK ASSESSMENT
Risk factors: Single, male, psychosis with delusions and paranoia, impulsivity, threatening behaviors, substance abuse, occupational decline, absence of outpatient follow-up, limited insight into symptoms, poor reactivity to stressors, difficulty expressing thoughts/emotions, low frustration tolerance. Risk factors: Single, male, psychosis resulting in paranoia and aggressive ideations, impulsivity, prior hospitalization, substance abuse, limited insight into symptoms, chronic anger management issues and low frustration tolerance, and medication non- compliance.     Protective factors: Young, healthy, denies any active suicidal ideation/intent/plan, no hx of prior attempts, no self-harm behaviors, did not act on his aggressive ideations, no family hx, identifies reasons for living, future oriented, supportive social network or family, endorsing desire for future medication/follow up compliance,  no access to firearms, no hx of abuse and adequate outpatient follow up with motivation to participate in care.     Based on risk assessment evaluation, Pt does not appear to be at imminent risk of harm to self or others at this time.

## 2017-09-05 NOTE — ED ADULT NURSE NOTE - CHIEF COMPLAINT QUOTE
"I have psychosis and Paranoia." Patient's father reports that patient has not been himself, wants to harm others and has been demonstrating aggressive behaviors. Patient was discharged last week, patient is not compliant with medications, hearing voices, denies any suicidal ideations. Patient smokes weeds, denies alcohol use.

## 2017-09-05 NOTE — ED BEHAVIORAL HEALTH ASSESSMENT NOTE - OTHER
Father Substance abuse Labile - cooperative for periods of time and then hostile/psychotic Varying - At times very soft/quiet and later loud Waxing/Waning At times loose but other times normal Yuea Patient endorsed AH to medical NP, however denied to writer. Unclear Not fully assessed, grossly normal [id number] "ok" Initially irritable/agitated but upon re-eval was calm and cooperative

## 2017-09-05 NOTE — ED PROVIDER NOTE - MEDICAL DECISION MAKING DETAILS
21y Male complaining brought to ed by his father- Jerry Latif for psych eval in setting of paranoia and aggression. Pt is well appearing, no injuries on exam- labs unrevealing thus far.  Medically clear for psych dispo-.

## 2017-09-05 NOTE — ED ADULT NURSE REASSESSMENT NOTE - NS ED NURSE REASSESS COMMENT FT1
Pt received STAT Ativan 2/Haldol 5/Benadryl 50mg IM for agitation and psychosis with + effect observed. Pt is currently resting, NAD, even unlabored respirations noted. Lab results and evaluations pending. Will continue to monitor for safety.

## 2017-09-06 VITALS
OXYGEN SATURATION: 100 % | DIASTOLIC BLOOD PRESSURE: 63 MMHG | SYSTOLIC BLOOD PRESSURE: 151 MMHG | RESPIRATION RATE: 16 BRPM | HEART RATE: 78 BPM

## 2017-09-06 RX ORDER — RISPERIDONE 4 MG/1
2 TABLET ORAL ONCE
Qty: 0 | Refills: 0 | Status: COMPLETED | OUTPATIENT
Start: 2017-09-06 | End: 2017-09-06

## 2017-09-06 RX ADMIN — RISPERIDONE 2 MILLIGRAM(S): 4 TABLET ORAL at 07:03

## 2017-09-06 NOTE — ED ADULT NURSE REASSESSMENT NOTE - GENERAL PATIENT STATE
pt is awake, alert, calm and able to make needs known appropriately
pt aroused, remains drowsy s/p IM meds receievd. Awake alert to person and place. Denies s/i h/i. Vitals as stated

## 2017-09-06 NOTE — ED ADULT NURSE REASSESSMENT NOTE - STATUS
pending psych reassessment/awaiting consult
awaiting consult/pt is medically cleared, awaiting psychiatric evaluation once awake and alert s/p IM meds received

## 2017-09-07 ENCOUNTER — EMERGENCY (EMERGENCY)
Facility: HOSPITAL | Age: 22
LOS: 1 days | Discharge: HOME | End: 2017-09-07
Admitting: EMERGENCY MEDICINE
Payer: COMMERCIAL

## 2017-09-07 VITALS
SYSTOLIC BLOOD PRESSURE: 105 MMHG | DIASTOLIC BLOOD PRESSURE: 67 MMHG | TEMPERATURE: 98 F | OXYGEN SATURATION: 100 % | RESPIRATION RATE: 20 BRPM | HEART RATE: 62 BPM

## 2017-09-07 PROCEDURE — 73060 X-RAY EXAM OF HUMERUS: CPT | Mod: 26,LT

## 2017-09-07 PROCEDURE — 73070 X-RAY EXAM OF ELBOW: CPT | Mod: 26,LT

## 2017-09-07 PROCEDURE — 99284 EMERGENCY DEPT VISIT MOD MDM: CPT

## 2017-09-07 PROCEDURE — 73090 X-RAY EXAM OF FOREARM: CPT | Mod: 26,LT

## 2017-09-07 RX ORDER — ACETAMINOPHEN 500 MG
650 TABLET ORAL ONCE
Qty: 0 | Refills: 0 | Status: COMPLETED | OUTPATIENT
Start: 2017-09-07 | End: 2017-09-07

## 2017-09-07 NOTE — ED PROVIDER NOTE - CHPI ED SYMPTOMS NEG
no dizziness/no vomiting/no weakness/no fever/no chills/no nausea/no tingling/no decreased eating/drinking/no numbness
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
## 2017-09-07 NOTE — ED PROVIDER NOTE - MUSCULOSKELETAL, MLM
L arm non tender throughout, FROM, NVI, sensate intact, no e/o injury or trauma, well healed incision from prior surgery, Spine appears normal, range of motion is not limited, no muscle or joint tenderness
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
## 2017-09-07 NOTE — ED PROVIDER NOTE - CARE PLAN
Principal Discharge DX:	Arm pain Principal Discharge DX:	Arm pain  Instructions for follow-up, activity and diet:	Rest, drink plenty of fluids.  Advance activity as tolerated.  Continue all previously prescribed medications as directed. You can use motrin 600mg every 6-8 hours for pain or fever, and/or Tylenol 650 mg every 4 hours for pain/fever. Follow up with your primary care physician in 48-72 hours- bring copies of your results.  Return to the emergency department for chest pain, shortness of breath, dizziness, or worsening, concerning, or persistent symptoms.
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
## 2017-09-20 ENCOUNTER — OUTPATIENT (OUTPATIENT)
Dept: OUTPATIENT SERVICES | Facility: HOSPITAL | Age: 22
LOS: 1 days | Discharge: HOME | End: 2017-09-20
Payer: MEDICAID

## 2017-09-20 PROCEDURE — 90792 PSYCH DIAG EVAL W/MED SRVCS: CPT

## 2017-09-26 ENCOUNTER — EMERGENCY (EMERGENCY)
Facility: HOSPITAL | Age: 22
LOS: 0 days | Discharge: HOME | End: 2017-09-26
Attending: EMERGENCY MEDICINE
Payer: MEDICAID

## 2017-09-26 VITALS
DIASTOLIC BLOOD PRESSURE: 63 MMHG | WEIGHT: 143.96 LBS | SYSTOLIC BLOOD PRESSURE: 120 MMHG | HEART RATE: 74 BPM | RESPIRATION RATE: 17 BRPM | HEIGHT: 69 IN | OXYGEN SATURATION: 100 % | TEMPERATURE: 98 F
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
## 2017-09-26 DIAGNOSIS — M79.602 PAIN IN LEFT ARM: ICD-10-CM

## 2017-09-26 DIAGNOSIS — S50.12XA CONTUSION OF LEFT FOREARM, INITIAL ENCOUNTER: ICD-10-CM

## 2017-09-26 DIAGNOSIS — F29 UNSPECIFIED PSYCHOSIS NOT DUE TO A SUBSTANCE OR KNOWN PHYSIOLOGICAL CONDITION: ICD-10-CM

## 2017-09-26 PROCEDURE — 99284 EMERGENCY DEPT VISIT MOD MDM: CPT | Mod: 25

## 2017-09-26 PROCEDURE — 99053 MED SERV 10PM-8AM 24 HR FAC: CPT

## 2017-09-26 PROCEDURE — 73080 X-RAY EXAM OF ELBOW: CPT | Mod: 26,LT

## 2017-09-26 PROCEDURE — 73060 X-RAY EXAM OF HUMERUS: CPT | Mod: 26,LT

## 2017-09-26 RX ORDER — OXYCODONE HYDROCHLORIDE 5 MG/1
1 TABLET ORAL
Qty: 15 | Refills: 0 | OUTPATIENT
Start: 2017-09-26 | End: 2017-09-28

## 2017-09-26 RX ORDER — IBUPROFEN 200 MG
1 TABLET ORAL
Qty: 30 | Refills: 0 | OUTPATIENT
Start: 2017-09-26 | End: 2017-10-26

## 2017-09-26 RX ORDER — IBUPROFEN 200 MG
600 TABLET ORAL ONCE
Qty: 0 | Refills: 0 | Status: COMPLETED | OUTPATIENT
Start: 2017-09-26 | End: 2017-09-26

## 2017-09-26 RX ADMIN — Medication 600 MILLIGRAM(S): at 02:24
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
## 2017-09-26 NOTE — ED PROVIDER NOTE - PHYSICAL EXAMINATION
General:     NAD, well-nourished, well-appearing  Head:     NC/AT, EOMI, oral mucosa moist  Neck:     trachea midline  Lungs:     CTA b/l, no w/r/r  CVS:     S1S2, RRR, no m/g/r  Abd:     +BS, s/nt/nd, no organomegaly  Ext:    2+ radial and pedal pulses, no c/c/e.  L UE:  from/nt @ left shoulder and wrist.  ttp @ lateral epicondyle.  unable to fully extend elbow.  Neuro: AAOx3, no sensory/motor deficits

## 2017-10-27 PROCEDURE — 99213 OFFICE O/P EST LOW 20 MIN: CPT

## 2017-11-19 ENCOUNTER — INPATIENT (INPATIENT)
Facility: HOSPITAL | Age: 22
LOS: 4 days | Discharge: HOME | End: 2017-11-24
Attending: PSYCHIATRY & NEUROLOGY | Admitting: PSYCHIATRY & NEUROLOGY
Payer: MEDICAID

## 2017-11-19 VITALS
OXYGEN SATURATION: 100 % | SYSTOLIC BLOOD PRESSURE: 136 MMHG | RESPIRATION RATE: 15 BRPM | HEART RATE: 90 BPM | TEMPERATURE: 98 F | DIASTOLIC BLOOD PRESSURE: 86 MMHG

## 2017-11-19 DIAGNOSIS — F29 UNSPECIFIED PSYCHOSIS NOT DUE TO A SUBSTANCE OR KNOWN PHYSIOLOGICAL CONDITION: ICD-10-CM

## 2017-11-19 LAB
ALBUMIN SERPL ELPH-MCNC: 4.7 G/DL — SIGNIFICANT CHANGE UP (ref 3.3–5)
ALP SERPL-CCNC: 65 U/L — SIGNIFICANT CHANGE UP (ref 40–120)
ALT FLD-CCNC: 10 U/L — SIGNIFICANT CHANGE UP (ref 4–41)
APAP SERPL-MCNC: < 15 UG/ML — LOW (ref 15–25)
AST SERPL-CCNC: 16 U/L — SIGNIFICANT CHANGE UP (ref 4–40)
BARBITURATES MEASUREMENT: NEGATIVE — SIGNIFICANT CHANGE UP
BASOPHILS # BLD AUTO: 0.01 K/UL — SIGNIFICANT CHANGE UP (ref 0–0.2)
BASOPHILS NFR BLD AUTO: 0.2 % — SIGNIFICANT CHANGE UP (ref 0–2)
BENZODIAZ SERPL-MCNC: NEGATIVE — SIGNIFICANT CHANGE UP
BILIRUB SERPL-MCNC: 0.3 MG/DL — SIGNIFICANT CHANGE UP (ref 0.2–1.2)
BUN SERPL-MCNC: 16 MG/DL — SIGNIFICANT CHANGE UP (ref 7–23)
CALCIUM SERPL-MCNC: 8.8 MG/DL — SIGNIFICANT CHANGE UP (ref 8.4–10.5)
CHLORIDE SERPL-SCNC: 104 MMOL/L — SIGNIFICANT CHANGE UP (ref 98–107)
CO2 SERPL-SCNC: 24 MMOL/L — SIGNIFICANT CHANGE UP (ref 22–31)
CREAT SERPL-MCNC: 0.99 MG/DL — SIGNIFICANT CHANGE UP (ref 0.5–1.3)
EOSINOPHIL # BLD AUTO: 0.1 K/UL — SIGNIFICANT CHANGE UP (ref 0–0.5)
EOSINOPHIL NFR BLD AUTO: 1.7 % — SIGNIFICANT CHANGE UP (ref 0–6)
ETHANOL BLD-MCNC: < 10 MG/DL — SIGNIFICANT CHANGE UP
GLUCOSE SERPL-MCNC: 93 MG/DL — SIGNIFICANT CHANGE UP (ref 70–99)
HCT VFR BLD CALC: 41 % — SIGNIFICANT CHANGE UP (ref 39–50)
HGB BLD-MCNC: 14 G/DL — SIGNIFICANT CHANGE UP (ref 13–17)
IMM GRANULOCYTES # BLD AUTO: 0.01 # — SIGNIFICANT CHANGE UP
IMM GRANULOCYTES NFR BLD AUTO: 0.2 % — SIGNIFICANT CHANGE UP (ref 0–1.5)
LYMPHOCYTES # BLD AUTO: 1.67 K/UL — SIGNIFICANT CHANGE UP (ref 1–3.3)
LYMPHOCYTES # BLD AUTO: 28 % — SIGNIFICANT CHANGE UP (ref 13–44)
MCHC RBC-ENTMCNC: 28.7 PG — SIGNIFICANT CHANGE UP (ref 27–34)
MCHC RBC-ENTMCNC: 34.1 % — SIGNIFICANT CHANGE UP (ref 32–36)
MCV RBC AUTO: 84 FL — SIGNIFICANT CHANGE UP (ref 80–100)
MONOCYTES # BLD AUTO: 0.57 K/UL — SIGNIFICANT CHANGE UP (ref 0–0.9)
MONOCYTES NFR BLD AUTO: 9.5 % — SIGNIFICANT CHANGE UP (ref 2–14)
NEUTROPHILS # BLD AUTO: 3.61 K/UL — SIGNIFICANT CHANGE UP (ref 1.8–7.4)
NEUTROPHILS NFR BLD AUTO: 60.4 % — SIGNIFICANT CHANGE UP (ref 43–77)
NRBC # FLD: 0 — SIGNIFICANT CHANGE UP
PLATELET # BLD AUTO: 218 K/UL — SIGNIFICANT CHANGE UP (ref 150–400)
PMV BLD: 9.8 FL — SIGNIFICANT CHANGE UP (ref 7–13)
POTASSIUM SERPL-MCNC: 3.8 MMOL/L — SIGNIFICANT CHANGE UP (ref 3.5–5.3)
POTASSIUM SERPL-SCNC: 3.8 MMOL/L — SIGNIFICANT CHANGE UP (ref 3.5–5.3)
PROT SERPL-MCNC: 7.4 G/DL — SIGNIFICANT CHANGE UP (ref 6–8.3)
RBC # BLD: 4.88 M/UL — SIGNIFICANT CHANGE UP (ref 4.2–5.8)
RBC # FLD: 12.1 % — SIGNIFICANT CHANGE UP (ref 10.3–14.5)
SALICYLATES SERPL-MCNC: < 5 MG/DL — LOW (ref 15–30)
SODIUM SERPL-SCNC: 141 MMOL/L — SIGNIFICANT CHANGE UP (ref 135–145)
TSH SERPL-MCNC: 1.5 UIU/ML — SIGNIFICANT CHANGE UP (ref 0.27–4.2)
WBC # BLD: 5.97 K/UL — SIGNIFICANT CHANGE UP (ref 3.8–10.5)
WBC # FLD AUTO: 5.97 K/UL — SIGNIFICANT CHANGE UP (ref 3.8–10.5)

## 2017-11-19 PROCEDURE — 99285 EMERGENCY DEPT VISIT HI MDM: CPT

## 2017-11-19 RX ORDER — RISPERIDONE 4 MG/1
2 TABLET ORAL ONCE
Qty: 0 | Refills: 0 | Status: COMPLETED | OUTPATIENT
Start: 2017-11-19 | End: 2017-11-19

## 2017-11-19 RX ADMIN — Medication 2 MILLIGRAM(S): at 21:02

## 2017-11-19 NOTE — ED BEHAVIORAL HEALTH ASSESSMENT NOTE - SUICIDE RISK FACTORS
Unable to engage in safety planning/Agitation/severe anxiety/Access to means (pills, firearms, etc.)/Highly impulsive behavior/Substance abuse/dependence

## 2017-11-19 NOTE — ED ADULT NURSE NOTE - CHIEF COMPLAINT QUOTE
alert no distress as per father has not been himself since wed   has been depressed  crying frequently  laughing outbursts   staring   crying in triage   hx psychosis  denies drugs or alcohol  spoke linda  NP will go to

## 2017-11-19 NOTE — ED PROVIDER NOTE - OBJECTIVE STATEMENT
This is a 22 year old Male PMHX substance, psychosis BIB family for psych eval r/t medication noncompliants. Father Jerry at bedside reports his son has been crying, nonverbal for 2 days. Reports he missed his Risperidone injection two weeks ago. States he had a tooth extraction recently and was prescribed Amoxicillin and Percocet. Reports patient is very quiet and crying. Reports marijuana use.  Patient appears crying, nonverbal appears internally processing, pacing required Ativan 2mg Im x 1

## 2017-11-19 NOTE — ED BEHAVIORAL HEALTH ASSESSMENT NOTE - SUMMARY
22 year old male, domiciled, unemployed, non-caregiver, with a recent stay at [facility] from 8/23-30, history of medication noncompliance. missed his last dose of respirdone consta 25mg 2 weeks ago, history of psychotic disorder unspecified versus substance induced psychotic disorder, denies prior suicide attempts, denies hx of violence or arrests, denies trauma, + marijuana and illicit pill abuse (assumed to be percocet but likely laced or other substance), with no relevant past medical history, brought in by dad, presenting with bizarre behavior X2 weeks, in the context of substance abuse and medication noncompliance.    Patient with disorganized TP, delusions and paranoia, labile mood and affect with threatening behaviors in the context of psychosis. He has been exhibiting these behaviors at home for about two weeks, Patient requires hospitalization for safety and stabilization as he is an acute risk to himself and others.

## 2017-11-19 NOTE — ED ADULT NURSE REASSESSMENT NOTE - NS ED NURSE REASSESS COMMENT FT1
21:30-Patient presents tearful, agitated, medication given as ordered, needs met, blood work done, pt currently awaiting further MD evaluation, will continue to monitor pt.

## 2017-11-19 NOTE — ED PROVIDER NOTE - MEDICAL DECISION MAKING DETAILS
This is a 22 year old Male PMHX substance, psychosis BIB family for psych eval r/t medication noncompliants. Father Jerry at bedside reports his son has been crying, nonverbal for 2 days. Reports he missed his Risperidone injection two weeks ago. Medical evaluation performed. There is no clinical evidence of intoxication or any acute medical problem requiring immediate intervention. Final disposition will be determined by psychiatrist.

## 2017-11-19 NOTE — ED BEHAVIORAL HEALTH ASSESSMENT NOTE - OTHER
Labile - cooperative for periods of time and then hostile/psychotic Varying - At times very soft/quiet and later loud Waxing/Waning At times loose but other times normal Yuea Patient endorsed AH to medical NP, however denied to writer. Unclear father Substance abuse Not fully assessed, grossly normal

## 2017-11-19 NOTE — ED ADULT TRIAGE NOTE - CHIEF COMPLAINT QUOTE
alert no distress as per father has not been himself since wed   has been depressed  crying frequently  laughing outbursts   staring   crying in triage   hx psychosis  denies drugs or alcohol  spoke ilnda  NP will go to

## 2017-11-19 NOTE — ED BEHAVIORAL HEALTH ASSESSMENT NOTE - PSYCHIATRIC ISSUES AND PLAN (INCLUDE STANDING AND PRN MEDICATION)
Start Risperdal 2mg daily. May utilize PRNS: haldol 5mg, ativan 2mg, diphenhydramine 50mg, PO/IM, Q6H for Agitation

## 2017-11-19 NOTE — ED BEHAVIORAL HEALTH ASSESSMENT NOTE - HPI (INCLUDE ILLNESS QUALITY, SEVERITY, DURATION, TIMING, CONTEXT, MODIFYING FACTORS, ASSOCIATED SIGNS AND SYMPTOMS)
Patient is a 22 year old male, domiciled, unemployed, non-caregiver, with a recent stay at [facility] from 8/23-30, history of medication noncompliance. missed his last dose of respirdone consta 25mg 2 weeks ago, history of psychotic disorder unspecified versus substance induced psychotic disorder, denies prior suicide attempts, denies hx of violence or arrests, denies trauma, + marijuana and illicit pill abuse (assumed to be percocet but likely laced or other substance), with no relevant past medical history, brought in by dad, presenting with bizarre behavior X2 weeks, in the context of substance abuse and medication noncompliance.      Patient initially presents to ER patient noted to be crying without making any noise. He reports that he has been seeing thing and hearing his own voice all over the place. he reports that he does not want to be alive and needs to get away from his father. he reports feeling depressed. reports too much sleep, poor appetite and energy, concentration and interests. During the evaluation her appears internally preoccupied, eyes darting around the room. noncompliant with risperidone injection. reports smoking MJ daily, last a few hours ago. noted to have soft speech, thought blocking and disorganized thought process.     Spoke with pt's father, brought him to the hospital. he reports that the patient has had a sudden change in behavior for the past two weeks, with laughing inappropriately and increased agitation. He has been making paranoid statements without provocation, such as "you wanna fight" and other such statements questioning the intent of people around him. Patient has also been noted to be laughing to himself and appearing internally preoccupied at times. occasionally he will start speaking nonsensically, changing topics quickly and "speaking in circles". since having a dental procedure 3 days ago he has been taking amoxicillin and percocet and has been noted to be increasingly disorganized, with poor speech, slow movement. Patient is a 22 year old male, domiciled, unemployed, non-caregiver, with a recent stay at [facility] from 8/23-30, history of medication noncompliance. missed his last dose of respirdone consta 25mg 2 weeks ago, history of psychotic disorder unspecified versus substance induced psychotic disorder, denies prior suicide attempts, denies hx of violence or arrests, denies trauma, + marijuana and illicit pill abuse (assumed to be percocet but likely laced or other substance), with no relevant past medical history, brought in by dad, presenting with bizarre behavior X2 weeks, in the context of substance abuse and medication noncompliance. Symptoms worse over thel ast 3 days since a dental procedure when he was prescribed amoxicillin 500mg tid for 7 days (10 pills left) and percocet 5/325. father eports that the pt has been increasingly disorganized and irritable over the last 2 days.     Patient initially presents to ER patient noted to be crying without making any noise. He reports that he has been seeing thing and hearing his own voice all over the place. he reports that he does not want to be alive and needs to get away from his father. he reports feeling depressed. reports too much sleep, poor appetite and energy, concentration and interests. During the evaluation her appears internally preoccupied, eyes darting around the room. noncompliant with risperidone injection. reports smoking MJ daily, last a few hours ago. noted to have soft speech, thought blocking and disorganized thought process.     Spoke with pt's father, brought him to the hospital. he reports that the patient has had a sudden change in behavior for the past two weeks, with laughing inappropriately and increased agitation. He has been making paranoid statements without provocation, such as "you wanna fight" and other such statements questioning the intent of people around him. Patient has also been noted to be laughing to himself and appearing internally preoccupied at times. occasionally he will start speaking nonsensically, changing topics quickly and "speaking in circles". since having a dental procedure 3 days ago he has been taking amoxicillin and percocet and has been noted to be increasingly disorganized, with poor speech, slow movement.

## 2017-11-20 PROCEDURE — 99221 1ST HOSP IP/OBS SF/LOW 40: CPT

## 2017-11-20 RX ORDER — HALOPERIDOL DECANOATE 100 MG/ML
5 INJECTION INTRAMUSCULAR EVERY 6 HOURS
Qty: 0 | Refills: 0 | Status: DISCONTINUED | OUTPATIENT
Start: 2017-11-20 | End: 2017-11-24

## 2017-11-20 RX ORDER — DIPHENHYDRAMINE HCL 50 MG
50 CAPSULE ORAL ONCE
Qty: 0 | Refills: 0 | Status: DISCONTINUED | OUTPATIENT
Start: 2017-11-20 | End: 2017-11-24

## 2017-11-20 RX ORDER — RISPERIDONE 4 MG/1
2 TABLET ORAL DAILY
Qty: 0 | Refills: 0 | Status: DISCONTINUED | OUTPATIENT
Start: 2017-11-20 | End: 2017-11-20

## 2017-11-20 RX ORDER — AMOXICILLIN 250 MG/5ML
500 SUSPENSION, RECONSTITUTED, ORAL (ML) ORAL THREE TIMES A DAY
Qty: 0 | Refills: 0 | Status: DISCONTINUED | OUTPATIENT
Start: 2017-11-20 | End: 2017-11-24

## 2017-11-20 RX ORDER — RISPERIDONE 4 MG/1
1 TABLET ORAL
Qty: 0 | Refills: 0 | Status: DISCONTINUED | OUTPATIENT
Start: 2017-11-20 | End: 2017-11-22

## 2017-11-20 RX ORDER — DIPHENHYDRAMINE HCL 50 MG
50 CAPSULE ORAL EVERY 4 HOURS
Qty: 0 | Refills: 0 | Status: DISCONTINUED | OUTPATIENT
Start: 2017-11-20 | End: 2017-11-24

## 2017-11-20 RX ORDER — HALOPERIDOL DECANOATE 100 MG/ML
5 INJECTION INTRAMUSCULAR ONCE
Qty: 0 | Refills: 0 | Status: DISCONTINUED | OUTPATIENT
Start: 2017-11-20 | End: 2017-11-24

## 2017-11-20 RX ADMIN — RISPERIDONE 2 MILLIGRAM(S): 4 TABLET ORAL at 09:46

## 2017-11-20 RX ADMIN — RISPERIDONE 1 MILLIGRAM(S): 4 TABLET ORAL at 22:03

## 2017-11-20 RX ADMIN — Medication 500 MILLIGRAM(S): at 13:42

## 2017-11-20 RX ADMIN — Medication 500 MILLIGRAM(S): at 09:46

## 2017-11-20 RX ADMIN — Medication 500 MILLIGRAM(S): at 22:04

## 2017-11-21 PROCEDURE — 90853 GROUP PSYCHOTHERAPY: CPT

## 2017-11-21 RX ORDER — ACETAMINOPHEN 500 MG
650 TABLET ORAL EVERY 6 HOURS
Qty: 0 | Refills: 0 | Status: DISCONTINUED | OUTPATIENT
Start: 2017-11-21 | End: 2017-11-24

## 2017-11-21 RX ORDER — RISPERIDONE 4 MG/1
1 TABLET ORAL ONCE
Qty: 0 | Refills: 0 | Status: COMPLETED | OUTPATIENT
Start: 2017-11-21 | End: 2017-11-21

## 2017-11-21 RX ADMIN — RISPERIDONE 1 MILLIGRAM(S): 4 TABLET ORAL at 18:00

## 2017-11-21 RX ADMIN — Medication 2 MILLIGRAM(S): at 18:00

## 2017-11-21 RX ADMIN — Medication 650 MILLIGRAM(S): at 18:23

## 2017-11-21 RX ADMIN — Medication 650 MILLIGRAM(S): at 20:40

## 2017-11-21 RX ADMIN — RISPERIDONE 1 MILLIGRAM(S): 4 TABLET ORAL at 08:12

## 2017-11-21 RX ADMIN — RISPERIDONE 1 MILLIGRAM(S): 4 TABLET ORAL at 20:40

## 2017-11-21 RX ADMIN — Medication 500 MILLIGRAM(S): at 12:25

## 2017-11-21 RX ADMIN — Medication 500 MILLIGRAM(S): at 08:12

## 2017-11-22 PROCEDURE — 90853 GROUP PSYCHOTHERAPY: CPT

## 2017-11-22 PROCEDURE — 99232 SBSQ HOSP IP/OBS MODERATE 35: CPT | Mod: 25

## 2017-11-22 PROCEDURE — 93010 ELECTROCARDIOGRAM REPORT: CPT

## 2017-11-22 RX ORDER — RISPERIDONE 4 MG/1
2 TABLET ORAL AT BEDTIME
Qty: 0 | Refills: 0 | Status: COMPLETED | OUTPATIENT
Start: 2017-11-22 | End: 2017-11-22

## 2017-11-22 RX ORDER — RISPERIDONE 4 MG/1
3 TABLET ORAL AT BEDTIME
Qty: 0 | Refills: 0 | Status: DISCONTINUED | OUTPATIENT
Start: 2017-11-23 | End: 2017-11-24

## 2017-11-22 RX ADMIN — Medication 500 MILLIGRAM(S): at 14:23

## 2017-11-22 RX ADMIN — Medication 500 MILLIGRAM(S): at 08:13

## 2017-11-22 RX ADMIN — Medication 500 MILLIGRAM(S): at 20:01

## 2017-11-22 RX ADMIN — RISPERIDONE 2 MILLIGRAM(S): 4 TABLET ORAL at 20:01

## 2017-11-22 RX ADMIN — RISPERIDONE 1 MILLIGRAM(S): 4 TABLET ORAL at 08:13

## 2017-11-23 PROCEDURE — 99231 SBSQ HOSP IP/OBS SF/LOW 25: CPT

## 2017-11-23 RX ADMIN — RISPERIDONE 3 MILLIGRAM(S): 4 TABLET ORAL at 20:17

## 2017-11-23 RX ADMIN — Medication 2 MILLIGRAM(S): at 13:55

## 2017-11-24 VITALS — TEMPERATURE: 97 F

## 2017-11-24 PROCEDURE — 90853 GROUP PSYCHOTHERAPY: CPT

## 2017-11-24 PROCEDURE — 99238 HOSP IP/OBS DSCHRG MGMT 30/<: CPT | Mod: 25

## 2017-11-24 RX ORDER — RISPERIDONE 4 MG/1
25 TABLET ORAL
Qty: 0 | Refills: 0 | COMMUNITY
Start: 2017-11-24

## 2017-11-24 RX ORDER — RISPERIDONE 4 MG/1
25 TABLET ORAL
Qty: 0 | Refills: 0 | DISCHARGE
Start: 2017-11-24

## 2017-11-24 RX ORDER — RISPERIDONE 4 MG/1
1 TABLET ORAL
Qty: 21 | Refills: 0
Start: 2017-11-24 | End: 2017-12-15

## 2017-11-24 RX ORDER — RISPERIDONE 4 MG/1
25 TABLET ORAL ONCE
Qty: 0 | Refills: 0 | Status: COMPLETED | OUTPATIENT
Start: 2017-11-24 | End: 2017-11-24

## 2017-11-24 RX ORDER — BENZTROPINE MESYLATE 1 MG
1 TABLET ORAL
Qty: 60 | Refills: 0
Start: 2017-11-24 | End: 2017-12-24

## 2017-11-24 RX ADMIN — RISPERIDONE 25 MILLIGRAM(S): 4 TABLET ORAL at 13:40

## 2017-12-01 PROCEDURE — 99213 OFFICE O/P EST LOW 20 MIN: CPT
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
## 2017-12-29 PROCEDURE — 99213 OFFICE O/P EST LOW 20 MIN: CPT

## 2018-02-15 PROCEDURE — 99213 OFFICE O/P EST LOW 20 MIN: CPT

## 2018-03-04 NOTE — ED ADULT TRIAGE NOTE - MODE OF ARRIVAL
Problem: Inpatient Physical Therapy  Goal: Transfer Training Goal 2 LTG- PT  Outcome: Ongoing (interventions implemented as appropriate)   03/04/18 0958   Transfer Training 2 PT LTG   Transfer Training PT 2 LTG, Date Established 03/04/18   Transfer Training PT 2 LTG, Time to Achieve by discharge   Transfer Training PT 2 LTG, Activity Type bed to chair /chair to bed;sit to stand/stand to sit;toilet   Transfer Training PT 2 LTG, [place] Level independent   Transfer Training PT 2 LTG, Assist Device walker, rolling     Goal: Gait Training Goal LTG- PT  Outcome: Ongoing (interventions implemented as appropriate)   03/04/18 0958   Gait Training PT LTG   Gait Training Goal PT LTG, Date Established 03/04/18   Gait Training Goal PT LTG, [place] Level conditional independence   Gait Training Goal PT LTG, Assist Device walker, rolling   Gait Training Goal PT LTG, Distance to Achieve 200          EMS

## 2018-03-29 PROCEDURE — 99213 OFFICE O/P EST LOW 20 MIN: CPT

## 2018-04-13 DIAGNOSIS — F12.10 CANNABIS ABUSE, UNCOMPLICATED: ICD-10-CM

## 2018-05-10 PROCEDURE — 99213 OFFICE O/P EST LOW 20 MIN: CPT

## 2018-06-22 PROCEDURE — 99213 OFFICE O/P EST LOW 20 MIN: CPT

## 2018-09-14 PROCEDURE — 99213 OFFICE O/P EST LOW 20 MIN: CPT

## 2018-12-11 NOTE — ED ADULT TRIAGE NOTE - HEART RATE (BEATS/MIN)
Problem: Self Care Deficits Care Plan (Adult) Goal: *Therapy Goal (Edit Goal, Insert Text) STG 3: Patient will complete toileting with CGA using AE/DME as necessary within 1 week. CONTINUE GOAL (12/4/2018) LTG 3: Patient will complete toileting with modified independence using AE/DME as necessary within 2 weeks. CONTINUE GOAL (12/4/2018) Not met 12/11/2018 Outcome: Not Met 
Variance: Patient/Family Other Comments: Goal not met due to patient did not toilet during discharge evaluation. Goal: *Therapy Goal (Edit Goal, Insert Text) LTG 5: Patient will complete simple home management task with supervision, using AE/DME as necessary, within 2 weeks. CONTINUE GOAL (12/4/2018) 
 not met 12/11/2018 Outcome: Not Met 
Variance: Patient/Family Other Comments: Goal not met due to patient continues to require assist due to persistent decreased safety/balance. 90

## 2018-12-20 NOTE — ED ADULT TRIAGE NOTE - SOURCE OF INFORMATION
· The device is functioning well and is appropriately programmed.              Plan:            Transtelephonic follow-up in 3 months     Patient

## 2019-03-09 ENCOUNTER — EMERGENCY (EMERGENCY)
Facility: HOSPITAL | Age: 24
LOS: 1 days | Discharge: HOME | End: 2019-03-09
Admitting: EMERGENCY MEDICINE
Payer: MEDICAID

## 2019-03-09 VITALS
HEART RATE: 100 BPM | RESPIRATION RATE: 16 BRPM | SYSTOLIC BLOOD PRESSURE: 131 MMHG | DIASTOLIC BLOOD PRESSURE: 77 MMHG | OXYGEN SATURATION: 100 % | TEMPERATURE: 99 F

## 2019-03-09 PROCEDURE — 99283 EMERGENCY DEPT VISIT LOW MDM: CPT | Mod: 25

## 2019-03-09 NOTE — ED ADULT TRIAGE NOTE - CHIEF COMPLAINT QUOTE
Pt c/o LUE pain sp trip & fall onto it worse with movement. States sx in LUE in 2016. Denies hitting head, LOC.

## 2019-03-10 PROBLEM — F19.10 OTHER PSYCHOACTIVE SUBSTANCE ABUSE, UNCOMPLICATED: Chronic | Status: ACTIVE | Noted: 2017-11-19

## 2019-03-10 PROCEDURE — 73080 X-RAY EXAM OF ELBOW: CPT | Mod: 26,LT

## 2019-03-10 PROCEDURE — 73060 X-RAY EXAM OF HUMERUS: CPT | Mod: 26,LT

## 2019-03-10 RX ORDER — IBUPROFEN 200 MG
600 TABLET ORAL ONCE
Qty: 0 | Refills: 0 | Status: COMPLETED | OUTPATIENT
Start: 2019-03-10 | End: 2019-03-10

## 2019-03-10 RX ADMIN — Medication 600 MILLIGRAM(S): at 00:34

## 2019-03-10 NOTE — ED PROVIDER NOTE - OBJECTIVE STATEMENT
22 y/o male no pmh c/o left arm pain s/p trip and fall. Pt admits to tripping over some wires at home, landing on his left arm. now c/o pain to left elbow/upper arm. Denies head trauma or LOC. Denies numbness, tingling, weakness, fever or chills.

## 2019-04-07 ENCOUNTER — EMERGENCY (EMERGENCY)
Facility: HOSPITAL | Age: 24
LOS: 0 days | Discharge: HOME | End: 2019-04-07
Payer: MEDICAID

## 2019-04-07 VITALS
TEMPERATURE: 98 F | RESPIRATION RATE: 16 BRPM | HEART RATE: 101 BPM | HEIGHT: 68 IN | SYSTOLIC BLOOD PRESSURE: 132 MMHG | OXYGEN SATURATION: 99 % | DIASTOLIC BLOOD PRESSURE: 77 MMHG | WEIGHT: 179.9 LBS

## 2019-04-07 VITALS
HEART RATE: 75 BPM | RESPIRATION RATE: 16 BRPM | DIASTOLIC BLOOD PRESSURE: 75 MMHG | OXYGEN SATURATION: 99 % | TEMPERATURE: 98 F | SYSTOLIC BLOOD PRESSURE: 126 MMHG
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
## 2019-04-07 DIAGNOSIS — S61.411A LACERATION WITHOUT FOREIGN BODY OF RIGHT HAND, INITIAL ENCOUNTER: ICD-10-CM

## 2019-04-07 PROCEDURE — 73130 X-RAY EXAM OF HAND: CPT | Mod: 26,RT

## 2019-04-07 PROCEDURE — 99283 EMERGENCY DEPT VISIT LOW MDM: CPT | Mod: 25

## 2019-04-07 PROCEDURE — 12001 RPR S/N/AX/GEN/TRNK 2.5CM/<: CPT

## 2019-04-07 RX ORDER — ACETAMINOPHEN 500 MG
650 TABLET ORAL ONCE
Qty: 0 | Refills: 0 | Status: COMPLETED | OUTPATIENT
Start: 2019-04-07 | End: 2019-04-07

## 2019-04-07 RX ORDER — TETANUS TOXOID, REDUCED DIPHTHERIA TOXOID AND ACELLULAR PERTUSSIS VACCINE, ADSORBED 5; 2.5; 8; 8; 2.5 [IU]/.5ML; [IU]/.5ML; UG/.5ML; UG/.5ML; UG/.5ML
0.5 SUSPENSION INTRAMUSCULAR ONCE
Qty: 0 | Refills: 0 | Status: COMPLETED | OUTPATIENT
Start: 2019-04-07 | End: 2019-04-07

## 2019-04-07 RX ADMIN — Medication 1 TABLET(S): at 13:05

## 2019-04-07 RX ADMIN — Medication 650 MILLIGRAM(S): at 12:59

## 2019-04-07 RX ADMIN — Medication 650 MILLIGRAM(S): at 12:29

## 2019-04-07 RX ADMIN — TETANUS TOXOID, REDUCED DIPHTHERIA TOXOID AND ACELLULAR PERTUSSIS VACCINE, ADSORBED 0.5 MILLILITER(S): 5; 2.5; 8; 8; 2.5 SUSPENSION INTRAMUSCULAR at 12:28
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
## 2019-04-07 NOTE — ED PROVIDER NOTE - PHYSICAL EXAMINATION
Gen: Alert, NAD, well appearing  Head: NC, AT, PERRL, EOMI, normal lids/conjunctiva  ENT: B TM WNL, normal hearing, patent oropharynx without erythema/exudate, uvula midline  Neck: +supple, no tenderness/meningismus/JVD, +Trachea midline  Pulm: Bilateral BS, normal resp effort, no wheeze/stridor/retractions  CV: RRR, no M/R/G, +dist pulses  Abd: soft, NT/ND, +BS, no hepatosplenomegaly  Mskel: no edema/erythema/cyanosis, R hand/wrist without bony tenderness, fdp/fds/ed intact, str 5/5, good , sensations intact u/r/m, full rom  Skin: 2cm superifical linear lac thenar eminence, 0.5cm lac inferior to that. no redness, no arterial bleed  Neuro: AAOx3, no sensory/motor deficits, CN 2-12 intact
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
## 2019-04-07 NOTE — ED ADULT NURSE NOTE - NSIMPLEMENTINTERV_GEN_ALL_ED
Implemented All Universal Safety Interventions:  [place] to call system. Call bell, personal items and telephone within reach. Instruct patient to call for assistance. Room bathroom lighting operational. Non-slip footwear when patient is off stretcher. Physically safe environment: no spills, clutter or unnecessary equipment. Stretcher in lowest position, wheels locked, appropriate side rails in place.
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
## 2019-04-19 ENCOUNTER — EMERGENCY (EMERGENCY)
Facility: HOSPITAL | Age: 24
LOS: 0 days | Discharge: HOME | End: 2019-04-19

## 2019-04-19 VITALS
DIASTOLIC BLOOD PRESSURE: 87 MMHG | TEMPERATURE: 97 F | RESPIRATION RATE: 17 BRPM | OXYGEN SATURATION: 100 % | WEIGHT: 179.9 LBS | SYSTOLIC BLOOD PRESSURE: 138 MMHG | HEART RATE: 58 BPM

## 2019-04-19 DIAGNOSIS — F19.19 OTHER PSYCHOACTIVE SUBSTANCE ABUSE WITH UNSPECIFIED PSYCHOACTIVE SUBSTANCE-INDUCED DISORDER: ICD-10-CM

## 2019-04-19 DIAGNOSIS — Z48.02 ENCOUNTER FOR REMOVAL OF SUTURES: ICD-10-CM

## 2019-04-19 NOTE — ED PROVIDER NOTE - SKIN, MLM
Skin normal color for race, warm, dry and intact. No evidence of rash.  RIGHT PALM- 3 SUTURES, + AROM, GOOD PULSE AND GOOD SENSORY  INTACT , NO ERYTHEMA

## 2019-06-02 ENCOUNTER — EMERGENCY (EMERGENCY)
Facility: HOSPITAL | Age: 24
LOS: 0 days | Discharge: HOME | End: 2019-06-02
Attending: EMERGENCY MEDICINE
Payer: COMMERCIAL

## 2019-06-02 VITALS
HEART RATE: 64 BPM | TEMPERATURE: 98 F | SYSTOLIC BLOOD PRESSURE: 91 MMHG | DIASTOLIC BLOOD PRESSURE: 52 MMHG | WEIGHT: 179.9 LBS | RESPIRATION RATE: 18 BRPM | HEIGHT: 69 IN | OXYGEN SATURATION: 99 %

## 2019-06-02 DIAGNOSIS — S39.012A STRAIN OF MUSCLE, FASCIA AND TENDON OF LOWER BACK, INITIAL ENCOUNTER: ICD-10-CM
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
## 2019-06-02 DIAGNOSIS — M54.5 LOW BACK PAIN: ICD-10-CM
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
## 2019-06-02 PROCEDURE — 99283 EMERGENCY DEPT VISIT LOW MDM: CPT | Mod: 25

## 2019-06-02 PROCEDURE — 99053 MED SERV 10PM-8AM 24 HR FAC: CPT

## 2019-06-02 PROCEDURE — 72100 X-RAY EXAM L-S SPINE 2/3 VWS: CPT | Mod: 26

## 2019-06-02 RX ORDER — KETOROLAC TROMETHAMINE 30 MG/ML
60 SYRINGE (ML) INJECTION ONCE
Refills: 0 | Status: DISCONTINUED | OUTPATIENT
Start: 2019-06-02 | End: 2019-06-02

## 2019-06-02 RX ADMIN — Medication 60 MILLIGRAM(S): at 02:21

## 2019-06-02 RX ADMIN — Medication 60 MILLIGRAM(S): at 02:10
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
## 2019-06-02 NOTE — ED ADULT NURSE NOTE - NSIMPLEMENTINTERV_GEN_ALL_ED
Implemented All Universal Safety Interventions:  [place] to call system. Call bell, personal items and telephone within reach. Instruct patient to call for assistance. Room bathroom lighting operational. Non-slip footwear when patient is off stretcher. Physically safe environment: no spills, clutter or unnecessary equipment. Stretcher in lowest position, wheels locked, appropriate side rails in place.

## 2019-06-02 NOTE — ED PROVIDER NOTE - NSFOLLOWUPINSTRUCTIONS_ED_ALL_ED_FT
Take the PAIN MEDICINE as needed for symptoms. Take the PAIN MEDICINE as needed for symptoms.    You will likely experience more soreness tomorrow morning.
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
## 2019-07-30 ENCOUNTER — EMERGENCY (EMERGENCY)
Facility: HOSPITAL | Age: 24
LOS: 1 days | Discharge: HOME | End: 2019-07-30
Attending: EMERGENCY MEDICINE | Admitting: STUDENT IN AN ORGANIZED HEALTH CARE EDUCATION/TRAINING PROGRAM
Payer: MEDICAID

## 2019-07-30 VITALS
SYSTOLIC BLOOD PRESSURE: 134 MMHG | TEMPERATURE: 99 F | RESPIRATION RATE: 18 BRPM | OXYGEN SATURATION: 100 % | DIASTOLIC BLOOD PRESSURE: 80 MMHG | HEART RATE: 99 BPM

## 2019-07-30 DIAGNOSIS — F43.24 ADJUSTMENT DISORDER WITH DISTURBANCE OF CONDUCT: ICD-10-CM

## 2019-07-30 DIAGNOSIS — R69 ILLNESS, UNSPECIFIED: ICD-10-CM

## 2019-07-30 DIAGNOSIS — F19.10 OTHER PSYCHOACTIVE SUBSTANCE ABUSE, UNCOMPLICATED: ICD-10-CM

## 2019-07-30 LAB
ALBUMIN SERPL ELPH-MCNC: 4.8 G/DL — SIGNIFICANT CHANGE UP (ref 3.3–5)
ALP SERPL-CCNC: 56 U/L — SIGNIFICANT CHANGE UP (ref 40–120)
ALT FLD-CCNC: 20 U/L — SIGNIFICANT CHANGE UP (ref 4–41)
AMPHET UR-MCNC: NEGATIVE — SIGNIFICANT CHANGE UP
ANION GAP SERPL CALC-SCNC: 11 MMO/L — SIGNIFICANT CHANGE UP (ref 7–14)
APAP SERPL-MCNC: < 15 UG/ML — LOW (ref 15–25)
APPEARANCE UR: CLEAR — SIGNIFICANT CHANGE UP
AST SERPL-CCNC: 34 U/L — SIGNIFICANT CHANGE UP (ref 4–40)
BACTERIA # UR AUTO: SIGNIFICANT CHANGE UP
BARBITURATES UR SCN-MCNC: NEGATIVE — SIGNIFICANT CHANGE UP
BASOPHILS # BLD AUTO: 0.02 K/UL — SIGNIFICANT CHANGE UP (ref 0–0.2)
BASOPHILS NFR BLD AUTO: 0.2 % — SIGNIFICANT CHANGE UP (ref 0–2)
BENZODIAZ UR-MCNC: NEGATIVE — SIGNIFICANT CHANGE UP
BILIRUB SERPL-MCNC: 1.4 MG/DL — HIGH (ref 0.2–1.2)
BILIRUB UR-MCNC: NEGATIVE — SIGNIFICANT CHANGE UP
BLOOD UR QL VISUAL: NEGATIVE — SIGNIFICANT CHANGE UP
BUN SERPL-MCNC: 10 MG/DL — SIGNIFICANT CHANGE UP (ref 7–23)
CALCIUM SERPL-MCNC: 9.6 MG/DL — SIGNIFICANT CHANGE UP (ref 8.4–10.5)
CANNABINOIDS UR-MCNC: POSITIVE — SIGNIFICANT CHANGE UP
CHLORIDE SERPL-SCNC: 101 MMOL/L — SIGNIFICANT CHANGE UP (ref 98–107)
CO2 SERPL-SCNC: 27 MMOL/L — SIGNIFICANT CHANGE UP (ref 22–31)
COCAINE METAB.OTHER UR-MCNC: POSITIVE — SIGNIFICANT CHANGE UP
COLOR SPEC: YELLOW — SIGNIFICANT CHANGE UP
CREAT SERPL-MCNC: 1.33 MG/DL — HIGH (ref 0.5–1.3)
EOSINOPHIL # BLD AUTO: 0.07 K/UL — SIGNIFICANT CHANGE UP (ref 0–0.5)
EOSINOPHIL NFR BLD AUTO: 0.8 % — SIGNIFICANT CHANGE UP (ref 0–6)
ETHANOL BLD-MCNC: < 10 MG/DL — SIGNIFICANT CHANGE UP
GLUCOSE SERPL-MCNC: 77 MG/DL — SIGNIFICANT CHANGE UP (ref 70–99)
GLUCOSE UR-MCNC: NEGATIVE — SIGNIFICANT CHANGE UP
HCT VFR BLD CALC: 43.5 % — SIGNIFICANT CHANGE UP (ref 39–50)
HGB BLD-MCNC: 14.1 G/DL — SIGNIFICANT CHANGE UP (ref 13–17)
HYALINE CASTS # UR AUTO: SIGNIFICANT CHANGE UP
IMM GRANULOCYTES NFR BLD AUTO: 0.2 % — SIGNIFICANT CHANGE UP (ref 0–1.5)
KETONES UR-MCNC: NEGATIVE — SIGNIFICANT CHANGE UP
LEUKOCYTE ESTERASE UR-ACNC: NEGATIVE — SIGNIFICANT CHANGE UP
LYMPHOCYTES # BLD AUTO: 1.19 K/UL — SIGNIFICANT CHANGE UP (ref 1–3.3)
LYMPHOCYTES # BLD AUTO: 13.8 % — SIGNIFICANT CHANGE UP (ref 13–44)
MCHC RBC-ENTMCNC: 27.7 PG — SIGNIFICANT CHANGE UP (ref 27–34)
MCHC RBC-ENTMCNC: 32.4 % — SIGNIFICANT CHANGE UP (ref 32–36)
MCV RBC AUTO: 85.5 FL — SIGNIFICANT CHANGE UP (ref 80–100)
METHADONE UR-MCNC: NEGATIVE — SIGNIFICANT CHANGE UP
MONOCYTES # BLD AUTO: 0.85 K/UL — SIGNIFICANT CHANGE UP (ref 0–0.9)
MONOCYTES NFR BLD AUTO: 9.8 % — SIGNIFICANT CHANGE UP (ref 2–14)
NEUTROPHILS # BLD AUTO: 6.49 K/UL — SIGNIFICANT CHANGE UP (ref 1.8–7.4)
NEUTROPHILS NFR BLD AUTO: 75.2 % — SIGNIFICANT CHANGE UP (ref 43–77)
NITRITE UR-MCNC: NEGATIVE — SIGNIFICANT CHANGE UP
NRBC # FLD: 0 K/UL — SIGNIFICANT CHANGE UP (ref 0–0)
OPIATES UR-MCNC: NEGATIVE — SIGNIFICANT CHANGE UP
OXYCODONE UR-MCNC: NEGATIVE — SIGNIFICANT CHANGE UP
PCP UR-MCNC: NEGATIVE — SIGNIFICANT CHANGE UP
PH UR: 6.5 — SIGNIFICANT CHANGE UP (ref 5–8)
PLATELET # BLD AUTO: 190 K/UL — SIGNIFICANT CHANGE UP (ref 150–400)
PMV BLD: 9.3 FL — SIGNIFICANT CHANGE UP (ref 7–13)
POTASSIUM SERPL-MCNC: 3.6 MMOL/L — SIGNIFICANT CHANGE UP (ref 3.5–5.3)
POTASSIUM SERPL-SCNC: 3.6 MMOL/L — SIGNIFICANT CHANGE UP (ref 3.5–5.3)
PROT SERPL-MCNC: 7.4 G/DL — SIGNIFICANT CHANGE UP (ref 6–8.3)
PROT UR-MCNC: 50 — SIGNIFICANT CHANGE UP
RBC # BLD: 5.09 M/UL — SIGNIFICANT CHANGE UP (ref 4.2–5.8)
RBC # FLD: 12.4 % — SIGNIFICANT CHANGE UP (ref 10.3–14.5)
RBC CASTS # UR COMP ASSIST: SIGNIFICANT CHANGE UP (ref 0–?)
SALICYLATES SERPL-MCNC: < 5 MG/DL — LOW (ref 15–30)
SODIUM SERPL-SCNC: 139 MMOL/L — SIGNIFICANT CHANGE UP (ref 135–145)
SP GR SPEC: 1.03 — SIGNIFICANT CHANGE UP (ref 1–1.04)
SQUAMOUS # UR AUTO: SIGNIFICANT CHANGE UP
TSH SERPL-MCNC: 0.53 UIU/ML — SIGNIFICANT CHANGE UP (ref 0.27–4.2)
UROBILINOGEN FLD QL: SIGNIFICANT CHANGE UP
WBC # BLD: 8.64 K/UL — SIGNIFICANT CHANGE UP (ref 3.8–10.5)
WBC # FLD AUTO: 8.64 K/UL — SIGNIFICANT CHANGE UP (ref 3.8–10.5)
WBC UR QL: HIGH (ref 0–?)

## 2019-07-30 PROCEDURE — 90792 PSYCH DIAG EVAL W/MED SRVCS: CPT

## 2019-07-30 PROCEDURE — 99283 EMERGENCY DEPT VISIT LOW MDM: CPT

## 2019-07-30 PROCEDURE — 70450 CT HEAD/BRAIN W/O DYE: CPT | Mod: 26

## 2019-07-30 PROCEDURE — 70486 CT MAXILLOFACIAL W/O DYE: CPT | Mod: 26

## 2019-07-30 RX ORDER — BACITRACIN ZINC 500 UNIT/G
1 OINTMENT IN PACKET (EA) TOPICAL ONCE
Refills: 0 | Status: COMPLETED | OUTPATIENT
Start: 2019-07-30 | End: 2019-07-30

## 2019-07-30 RX ORDER — TETANUS TOXOID, REDUCED DIPHTHERIA TOXOID AND ACELLULAR PERTUSSIS VACCINE, ADSORBED 5; 2.5; 8; 8; 2.5 [IU]/.5ML; [IU]/.5ML; UG/.5ML; UG/.5ML; UG/.5ML
0.5 SUSPENSION INTRAMUSCULAR ONCE
Refills: 0 | Status: COMPLETED | OUTPATIENT
Start: 2019-07-30 | End: 2019-07-30

## 2019-07-30 RX ADMIN — TETANUS TOXOID, REDUCED DIPHTHERIA TOXOID AND ACELLULAR PERTUSSIS VACCINE, ADSORBED 0.5 MILLILITER(S): 5; 2.5; 8; 8; 2.5 SUSPENSION INTRAMUSCULAR at 14:40

## 2019-07-30 RX ADMIN — Medication 1 APPLICATION(S): at 14:40

## 2019-07-30 NOTE — ED PROVIDER NOTE - PROGRESS NOTE DETAILS
Aarti Perea M.D: pt signed out to me pending CT head, which is now performed and negative for acute pathology. has been cleared by psych for discharge with outpatient follow up. stable for dc at this time.
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
## 2019-07-30 NOTE — ED PROVIDER NOTE - CARE PLAN
Principal Discharge DX:	Adjustment disorder with disturbance of conduct  Secondary Diagnosis:	Closed head injury

## 2019-07-30 NOTE — ED BEHAVIORAL HEALTH ASSESSMENT NOTE - REFERRAL / APPOINTMENT DETAILS
pt provided with information for [facility]; pt declines referrals for substance abuse treatment
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
## 2019-07-30 NOTE — ED BEHAVIORAL HEALTH ASSESSMENT NOTE - RISK ASSESSMENT
Risk factors: impulsivity, substance abuse, h/o psychosis, medication noncompliant, limited insight into symptoms.  Protective factors include no suicide attempts, no violence history, no access to guns, no global insomnia, supportive family, no suicidal ideation or homicidal ideation.   Pt is not at acutely elevated risk of harm to self or others.
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
## 2019-07-30 NOTE — ED PROVIDER NOTE - NSFOLLOWUPINSTRUCTIONS_ED_ALL_ED_FT
Closed Head Injury    A closed head injury is an injury to your head that may or may not involve a traumatic brain injury (TBI). Symptoms of TBI can be short or long lasting and include headache, dizziness, interference with memory or speech, fatigue, confusion, changes in sleep, mood changes, nausea, depression/anxiety, and dulling of senses. Make sure to obtain proper rest which includes getting plenty of sleep, avoiding excessive visual stimulation, and avoiding activities that may cause physical or mental stress. Avoid any situation where there is potential for another head injury, including sports.    SEEK IMMEDIATE MEDICAL CARE IF YOU HAVE ANY OF THE FOLLOWING SYMPTOMS: unusual drowsiness, vomiting, severe dizziness, seizures, lightheadedness, muscular weakness, different pupil sizes, visual changes, or clear or bloody discharge from your ears or nose.     Anxiety    Generalized anxiety disorder (CARLA) is a mental disorder. It is defined as anxiety that is not necessarily related to specific events or activities or is out of proportion to said events. Symptoms include restlessness, fatigue, difficulty concentrations, irritability and difficulty concentrating. It may interfere with life functions, including relationships, work, and school. If you were started on a medication, make sure to take exactly as prescribed and follow up with a psychiatrist.    SEEK IMMEDIATE MEDICAL CARE IF YOU HAVE ANY OF THE FOLLOWING SYMPTOMS: thoughts about hurting killing yourself, thoughts about hurting or killing somebody else, hallucinations, or worsening depression.
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
## 2019-07-30 NOTE — ED BEHAVIORAL HEALTH NOTE - BEHAVIORAL HEALTH NOTE
C-SSRS Screener     1. Have you ever wished to be dead or wished you could go to sleep and not wake up?  [  ]Yes, [ x ]No, [  ]Unable to Assess  Details _____________________________     2. Have you actually had any thoughts of killing yourself?   [  ]Yes, [ x ]No, [  ]Unable to Assess  Details _____________________________     If answer is “No” for 1 and 2, stop here. If answer is “Yes” to 1 or 2, proceed to 3.     3. Have you been thinking about how you might kill yourself?  [  ]Yes, [  ]No, [  ]Unable to Assess  Details _____________________________     4. Have you had these thoughts and had some intention of acting on them?  [  ]Yes, [  ]No, [  ]Unable to Assess  Details _____________________________     5. Have you started to work out or worked out the details of how to kill yourself? Do you intend to carry out this plan?  [  ]Yes, [  ]No, [  ]Unable to Assess  Details _____________________________     6. Have you ever done anything, started to do anything, or prepared to do anything to end your life? If so, was it in the past 3 months?  [  ]Yes, [  ]No, [  ]Unable to Assess  Details _____________________________        Additional Suicide Risk Factors (select all that apply)  [  ]Access to lethal means including firearms  [  ]Family history of suicide  [  ]Impulsivity  [  ] Current or past mood disorder  [  ] Current or past psychotic disorder  [  ] Current or past PTSD  [  ] Current or past ADHD  [  ] Current or past TBI  [  ] Current or past cluster B personality disorder or traits  [  ] Current or past conduct problems  [  ] Recent onset of current or past psychiatric disorder  [  ] Family history of psychiatric diagnoses requiring hospitalization     Additional Activating Events (select all that apply)  [  ]Perceived burden on family or others  [  ]Current sexual or physical abuse  [  ]Substance intoxication or withdrawal  [  ]Inadequate social supports  [  ]Hopeless about or dissatisfied with current provider or treatment     Additional Protective Factors (select all that apply)  [  ] Future plans  [  ] Alevism beliefs  [  ] Beloved pets

## 2019-07-30 NOTE — ED BEHAVIORAL HEALTH ASSESSMENT NOTE - DIFFERENTIAL
(0) swallows foods/liquids without difficulty Substance induced psychosis vs Schizophrenia vs Schizoaffective d/o vs Bipolar d/o

## 2019-08-02 ENCOUNTER — EMERGENCY (EMERGENCY)
Facility: HOSPITAL | Age: 24
LOS: 1 days | Discharge: HOME | End: 2019-08-02
Admitting: EMERGENCY MEDICINE
Payer: MEDICAID

## 2019-08-02 ENCOUNTER — OUTPATIENT (OUTPATIENT)
Dept: OUTPATIENT SERVICES | Facility: HOSPITAL | Age: 24
LOS: 1 days | Discharge: HOME | End: 2019-08-02

## 2019-08-02 VITALS
HEART RATE: 57 BPM | RESPIRATION RATE: 16 BRPM | DIASTOLIC BLOOD PRESSURE: 72 MMHG | TEMPERATURE: 98 F | OXYGEN SATURATION: 100 % | SYSTOLIC BLOOD PRESSURE: 128 MMHG

## 2019-08-02 PROCEDURE — 99283 EMERGENCY DEPT VISIT LOW MDM: CPT

## 2019-08-02 RX ORDER — BACITRACIN ZINC 500 UNIT/G
1 OINTMENT IN PACKET (EA) TOPICAL ONCE
Refills: 0 | Status: DISCONTINUED | OUTPATIENT
Start: 2019-08-02 | End: 2019-08-09

## 2019-08-02 NOTE — ED ADULT TRIAGE NOTE - CHIEF COMPLAINT QUOTE
A&Ox3 arrived via ems and police pt was aggressive and agitated at home as per pt fathers, pt recently d/c from [facility] and medications were adjusted, pt noted to have dressing on left knee dry and intact PMh schizophrenia bipolar,  Dee durand called

## 2019-08-02 NOTE — ED ADULT NURSE NOTE - OBJECTIVE STATEMENT
Received pt in  pt calm & cooperative bought in by EMS for aggression & agitation, pt denies si/hi/avh presently  safety & comfort measures maintained eval on going.
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
## 2019-08-02 NOTE — ED BEHAVIORAL HEALTH NOTE - BEHAVIORAL HEALTH NOTE
Writer was informed patient was medically cleared and required outpatient detox resources.  Writer met with patient who states he used to attend [facility] and will return there.  Patient was provided with a list of resources which he accepted. Elor was informed patient was medically cleared and required outpatient detox resources.   met with patient who states he used to attend [facility] and will return there.  Patient was provided with a list of resources which he accepted.   provided nursing ametrocard #[id number]

## 2019-08-02 NOTE — ED ADULT NURSE REASSESSMENT NOTE - NS ED NURSE REASSESS COMMENT FT1
pt calm & cooperative d/c by NP pt denies si/hi/avh presently resources provided to pt upon d/c pt verbalizing understanding.

## 2019-08-02 NOTE — ED PROVIDER NOTE - NSFOLLOWUPCLINICS_GEN_ALL_ED_FT
[facility]  Behavioral Health  [address] [place] [address]  Phone: [phone]  Fax:   Follow Up Time:

## 2019-08-02 NOTE — ED PROVIDER NOTE - CLINICAL SUMMARY MEDICAL DECISION MAKING FREE TEXT BOX
24 y/o M hx Marijuana Abuse  Medical evaluation performed. There is no clinical evidence of intoxication or any acute medical problem requiring immediate intervention. 22 y/o M hx Marijuana Abuse  Old Abrasion to left knee, right hand and forehead . Bacitracin applied, Tetanus up to date.     Medical evaluation performed. There is no clinical evidence of intoxication or any acute medical problem requiring immediate intervention. Recommend following up with [facility].

## 2019-08-06 ENCOUNTER — EMERGENCY (EMERGENCY)
Facility: HOSPITAL | Age: 24
LOS: 1 days | Discharge: HOME | End: 2019-08-06
Attending: EMERGENCY MEDICINE | Admitting: EMERGENCY MEDICINE
Payer: MEDICAID

## 2019-08-06 VITALS
DIASTOLIC BLOOD PRESSURE: 82 MMHG | TEMPERATURE: 98 F | OXYGEN SATURATION: 100 % | SYSTOLIC BLOOD PRESSURE: 125 MMHG | RESPIRATION RATE: 16 BRPM | HEART RATE: 53 BPM

## 2019-08-06 PROCEDURE — 99283 EMERGENCY DEPT VISIT LOW MDM: CPT

## 2019-08-06 PROCEDURE — 90792 PSYCH DIAG EVAL W/MED SRVCS: CPT

## 2019-08-06 NOTE — ED PROVIDER NOTE - PROGRESS NOTE DETAILS
Long discussion with collateral - Jerry (patients father).  Concerned about his substance use and acting out behaviors. Denies any appearance of speaking to self or others that are not there.  No reported hallucinations.  Not concerned for suicidal or homicidal behaviors.  Explained at length that the patient needs to want to get involved in rehab or detox and that this does not meet criteria for an emergency psychiatric admission.  Will also have BH attending call to confirm no other findings that would require admission

## 2019-08-06 NOTE — ED BEHAVIORAL HEALTH ASSESSMENT NOTE - DESCRIPTION (FIRST USE, LAST USE, QUANTITY, FREQUENCY, DURATION)
see HPI Illicit "Percocet" but unclear if this is what patient was actually buying, father also reports noticing his oxycodone missing father suspects recent xanax abuse, patient denies
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
## 2019-08-06 NOTE — ED PROVIDER NOTE - PHYSICAL EXAMINATION
Gen: Well appearing in NAD  Head: NC/AT  Neck: trachea midline  Resp:  No distress  Ext: no deformities  Neuro:  A&O appears non focal  Skin:  Warm and dry as visualized  Psych:  Normal affect and mood no SI HI AH VH good insight

## 2019-08-06 NOTE — ED PROVIDER NOTE - NSFOLLOWUPINSTRUCTIONS_ED_ALL_ED_FT
1. TAKE ALL MEDICATIONS AS DIRECTED.    2. FOR PAIN OR FEVER YOU CAN TAKE IBUPROFEN (MOTRIN, ADVIL) OR ACETAMINOPHEN (TYLENOL) AS NEEDED, AS DIRECTED ON PACKAGING.  3. FOLLOW UP WITH YOUR PRIMARY DOCTOR WITHIN 5 DAYS AS DIRECTED.  4. IF YOU HAD LABS OR IMAGING DONE, YOU WERE GIVEN COPIES OF ALL LABS AND/OR IMAGING RESULTS FROM YOUR ER VISIT--PLEASE TAKE THEM WITH YOU TO YOUR FOLLOW UP APPOINTMENTS.  5. IF NEEDED, CALL PATIENT ACCESS SERVICES AT [phone] TO FIND A PRIMARY CARE PHYSICIAN.  OR CALL [phone] TO MAKE AN APPOINTMENT WITH THE CLINIC.  6. RETURN TO THE ER FOR ANY WORSENING SYMPTOMS OR CONCERNS.
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
## 2019-08-06 NOTE — ED PROVIDER NOTE - CLINICAL SUMMARY MEDICAL DECISION MAKING FREE TEXT BOX
pt with aggressive destructive behavior when denied access to illicit substances.  Seems to be a recurrent problem based on charts from previous visit 2 days ago.  Does not seem to be criteria for involuntary admission.  Will have BH see patient as well to confirm.  Pt with good insight
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
## 2019-09-01 ENCOUNTER — OUTPATIENT (OUTPATIENT)
Dept: OUTPATIENT SERVICES | Facility: HOSPITAL | Age: 24
LOS: 1 days | End: 2019-09-01
Payer: MEDICAID

## 2019-09-12 DIAGNOSIS — Z71.89 OTHER SPECIFIED COUNSELING: ICD-10-CM

## 2019-09-19 DIAGNOSIS — Z76.89 PERSONS ENCOUNTERING HEALTH SERVICES IN OTHER SPECIFIED CIRCUMSTANCES: ICD-10-CM

## 2019-12-06 ENCOUNTER — EMERGENCY (EMERGENCY)
Facility: HOSPITAL | Age: 24
LOS: 0 days | Discharge: HOME | End: 2019-12-06
Attending: EMERGENCY MEDICINE
Payer: COMMERCIAL

## 2019-12-06 VITALS
TEMPERATURE: 98 F | SYSTOLIC BLOOD PRESSURE: 140 MMHG | HEART RATE: 62 BPM | RESPIRATION RATE: 17 BRPM | DIASTOLIC BLOOD PRESSURE: 78 MMHG | WEIGHT: 160.06 LBS | HEIGHT: 68 IN

## 2019-12-06 DIAGNOSIS — M54.5 LOW BACK PAIN: ICD-10-CM

## 2019-12-06 DIAGNOSIS — M54.2 CERVICALGIA: ICD-10-CM

## 2019-12-06 DIAGNOSIS — M25.519 PAIN IN UNSPECIFIED SHOULDER: ICD-10-CM
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
## 2019-12-06 PROCEDURE — 72125 CT NECK SPINE W/O DYE: CPT | Mod: 26

## 2019-12-06 PROCEDURE — 72131 CT LUMBAR SPINE W/O DYE: CPT | Mod: 26

## 2019-12-06 PROCEDURE — 99284 EMERGENCY DEPT VISIT MOD MDM: CPT

## 2019-12-06 PROCEDURE — 76377 3D RENDER W/INTRP POSTPROCES: CPT | Mod: 26,76

## 2019-12-06 RX ORDER — KETOROLAC TROMETHAMINE 30 MG/ML
60 SYRINGE (ML) INJECTION ONCE
Refills: 0 | Status: DISCONTINUED | OUTPATIENT
Start: 2019-12-06 | End: 2019-12-06

## 2019-12-06 RX ORDER — HYDROMORPHONE HYDROCHLORIDE 2 MG/ML
1 INJECTION INTRAMUSCULAR; INTRAVENOUS; SUBCUTANEOUS ONCE
Refills: 0 | Status: DISCONTINUED | OUTPATIENT
Start: 2019-12-06 | End: 2019-12-06

## 2019-12-06 RX ORDER — MORPHINE SULFATE 50 MG/1
4 CAPSULE, EXTENDED RELEASE ORAL ONCE
Refills: 0 | Status: DISCONTINUED | OUTPATIENT
Start: 2019-12-06 | End: 2019-12-06

## 2019-12-06 NOTE — ED ADULT NURSE NOTE - NSIMPLEMENTINTERV_GEN_ALL_ED
Implemented All Fall Risk Interventions:  [place] to call system. Call bell, personal items and telephone within reach. Instruct patient to call for assistance. Room bathroom lighting operational. Non-slip footwear when patient is off stretcher. Physically safe environment: no spills, clutter or unnecessary equipment. Stretcher in lowest position, wheels locked, appropriate side rails in place. Provide visual cue, wrist band, yellow gown, etc. Monitor gait and stability. Monitor for mental status changes and reorient to person, place, and time. Review medications for side effects contributing to fall risk. Reinforce activity limits and safety measures with patient and family.

## 2019-12-06 NOTE — ED PROVIDER NOTE - PATIENT PORTAL LINK FT
You can access the FollowMyHealth Patient Portal offered by [facility] by registering at the following website: [email or web address]. By joining [facility]’s FollowMyHealth portal, you will also be able to view your health information using other applications (apps) compatible with our system.
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
## 2019-12-06 NOTE — ED PROVIDER NOTE - CARE PROVIDER_API CALL
Maru Kennedy (DO)  Orthopaedic Surgery Surgery  [address], [place] [address]  Phone: [phone]  Fax: [phone]  Follow Up Time:

## 2020-02-01 PROCEDURE — G9005: CPT

## 2020-02-08 ENCOUNTER — EMERGENCY (EMERGENCY)
Facility: HOSPITAL | Age: 25
LOS: 1 days | Discharge: HOME | End: 2020-02-08
Attending: EMERGENCY MEDICINE | Admitting: EMERGENCY MEDICINE
Payer: MEDICAID

## 2020-02-08 VITALS
TEMPERATURE: 99 F | RESPIRATION RATE: 16 BRPM | OXYGEN SATURATION: 100 % | HEART RATE: 54 BPM | DIASTOLIC BLOOD PRESSURE: 78 MMHG | SYSTOLIC BLOOD PRESSURE: 126 MMHG

## 2020-02-08 VITALS
OXYGEN SATURATION: 99 % | TEMPERATURE: 98 F | SYSTOLIC BLOOD PRESSURE: 131 MMHG | DIASTOLIC BLOOD PRESSURE: 68 MMHG | RESPIRATION RATE: 16 BRPM | HEART RATE: 55 BPM

## 2020-02-08 LAB
AMPHET UR-MCNC: NEGATIVE — SIGNIFICANT CHANGE UP
APPEARANCE UR: CLEAR — SIGNIFICANT CHANGE UP
BACTERIA # UR AUTO: SIGNIFICANT CHANGE UP
BARBITURATES UR SCN-MCNC: NEGATIVE — SIGNIFICANT CHANGE UP
BENZODIAZ UR-MCNC: NEGATIVE — SIGNIFICANT CHANGE UP
BILIRUB UR-MCNC: NEGATIVE — SIGNIFICANT CHANGE UP
BLOOD UR QL VISUAL: NEGATIVE — SIGNIFICANT CHANGE UP
CANNABINOIDS UR-MCNC: POSITIVE — SIGNIFICANT CHANGE UP
COCAINE METAB.OTHER UR-MCNC: NEGATIVE — SIGNIFICANT CHANGE UP
COLOR SPEC: YELLOW — SIGNIFICANT CHANGE UP
GLUCOSE UR-MCNC: NEGATIVE — SIGNIFICANT CHANGE UP
HYALINE CASTS # UR AUTO: NEGATIVE — SIGNIFICANT CHANGE UP
KETONES UR-MCNC: HIGH
LEUKOCYTE ESTERASE UR-ACNC: NEGATIVE — SIGNIFICANT CHANGE UP
METHADONE UR-MCNC: NEGATIVE — SIGNIFICANT CHANGE UP
MUCOUS THREADS # UR AUTO: SIGNIFICANT CHANGE UP
NITRITE UR-MCNC: NEGATIVE — SIGNIFICANT CHANGE UP
OPIATES UR-MCNC: NEGATIVE — SIGNIFICANT CHANGE UP
OXYCODONE UR-MCNC: NEGATIVE — SIGNIFICANT CHANGE UP
PCP UR-MCNC: NEGATIVE — SIGNIFICANT CHANGE UP
PH UR: 6 — SIGNIFICANT CHANGE UP (ref 5–8)
PROT UR-MCNC: 30 — SIGNIFICANT CHANGE UP
RBC CASTS # UR COMP ASSIST: SIGNIFICANT CHANGE UP (ref 0–?)
SP GR SPEC: 1.03 — SIGNIFICANT CHANGE UP (ref 1–1.04)
SQUAMOUS # UR AUTO: SIGNIFICANT CHANGE UP
UROBILINOGEN FLD QL: NORMAL — SIGNIFICANT CHANGE UP
WBC UR QL: SIGNIFICANT CHANGE UP (ref 0–?)

## 2020-02-08 PROCEDURE — 99283 EMERGENCY DEPT VISIT LOW MDM: CPT

## 2020-02-08 PROCEDURE — 90792 PSYCH DIAG EVAL W/MED SRVCS: CPT
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
## 2020-02-08 NOTE — ED BEHAVIORAL HEALTH ASSESSMENT NOTE - RISK ASSESSMENT
Risk factors: impulsivity, substance abuse, h/o psychosis, medication noncompliant, limited insight into symptoms.  Protective factors include no suicide attempts, no violence history, no access to guns, no global insomnia, supportive family, no suicidal ideation or homicidal ideation.   Pt is not at acutely elevated risk of harm to self or others. Low Acute Suicide Risk

## 2020-02-08 NOTE — ED ADULT NURSE REASSESSMENT NOTE - NS ED NURSE REASSESS COMMENT FT1
Break Coverage RN: Pt discharged. Pt calm and cooperative at this time. Denies SI/HI/A/V hallucinations at this time. Pt given metrocard by SW for transport home. NAD at this time. Belongings returned to pt.
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
## 2020-02-08 NOTE — ED BEHAVIORAL HEALTH NOTE - BEHAVIORAL HEALTH NOTE
Worker was provided with metro card and shelter referrals for patient's travel. Patient is cleared psychiatrically for discharge.
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
## 2020-02-08 NOTE — ED BEHAVIORAL HEALTH ASSESSMENT NOTE - REFERRAL / APPOINTMENT DETAILS
pt again provided with information for [facility]; pt declines referrals for substance abuse treatment
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
## 2020-02-08 NOTE — ED BEHAVIORAL HEALTH ASSESSMENT NOTE - SUICIDE PROTECTIVE FACTORS
Has future plans/Supportive social network of family or friends/Engaged in work or school/Identifies reasons for living Has future plans/Supportive social network of family or friends/Identifies reasons for living
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
## 2020-02-08 NOTE — ED BEHAVIORAL HEALTH ASSESSMENT NOTE - NS ED BHA PLAN TR BH CONTACTED YN
General Scheduling Information  To schedule your CT/MRI scan, please contact [place] at [phone]   [address] [place] [address]    To schedule your Surgery, please contact our Specialty Schedulers at [phone]    ENT Clinic Locations Clinic Hours Telephone Number     Laura Anand  [address] [place] [address]   Tuesday:       8:00am -- 4:00pm    Wednesday:  8:00am - 4:00pm   To schedule an appointment with   Dr. Valles,   please contact our   Specialty Scheduling Department at:     [phone]       Laura Wall  [address] [place] [address]   Friday:          8:00am - 4:00pm         Urgent Care Locations Clinic Hours Telephone Numbers     Laura Parnell  [address] [place] [address]     Monday-Friday:     11:00pm - 9:00pm    Saturday-Sunday:  9:00am - 5:00pm   [phone]     Laura Wall  [address] [place] [address]     Monday-Friday:      5:00pm - 9:00pm     Saturday-Sunday:  9:00am - 5:00pm   [phone]       
No

## 2020-02-08 NOTE — ED PROVIDER NOTE - PATIENT PORTAL LINK FT
You can access the FollowMyHealth Patient Portal offered by [facility] by registering at the following website: [email or web address]. By joining [employer]’s FollowMyHealth portal, you will also be able to view your health information using other applications (apps) compatible with our system.
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
## 2020-02-08 NOTE — ED BEHAVIORAL HEALTH ASSESSMENT NOTE - DIFFERENTIAL
Substance induced psychosis vs Schizophrenia vs Schizoaffective d/o vs Bipolar d/o    r/o antisocial PD

## 2020-02-22 ENCOUNTER — EMERGENCY (EMERGENCY)
Facility: HOSPITAL | Age: 25
LOS: 1 days | Discharge: HOME | End: 2020-02-22
Admitting: EMERGENCY MEDICINE
Payer: MEDICAID

## 2020-02-22 VITALS
TEMPERATURE: 99 F | HEART RATE: 68 BPM | DIASTOLIC BLOOD PRESSURE: 74 MMHG | SYSTOLIC BLOOD PRESSURE: 122 MMHG | OXYGEN SATURATION: 100 % | RESPIRATION RATE: 16 BRPM

## 2020-02-22 PROCEDURE — 90792 PSYCH DIAG EVAL W/MED SRVCS: CPT | Mod: GC

## 2020-02-22 PROCEDURE — 99283 EMERGENCY DEPT VISIT LOW MDM: CPT
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
## 2020-02-22 NOTE — ED PROVIDER NOTE - PROGRESS NOTE DETAILS
MORENA: I was signed out this pt pending PSYCH consult. Psych saw pt and spoke to father. Pt is treat and release, they do not think he requires inpt psych hospitalization. Pt calm, cooperative. Will discharge home to father.
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
## 2020-02-22 NOTE — ED BEHAVIORAL HEALTH ASSESSMENT NOTE - RISK ASSESSMENT
Risk factors: impulsivity, substance abuse, h/o psychosis, medication noncompliant, limited insight into symptoms.  Protective factors include no suicide attempts, no violence history, no access to guns, no global insomnia, supportive family, no suicidal ideation or homicidal ideation.   Pt is not at acutely elevated risk of harm to self or others. Low Acute Suicide Risk Risk factors: impulsivity, substance abuse, h/o psychosis, medication noncompliant, limited insight into symptoms. Protective factors include no suicide attempts, no access to guns, no global insomnia, supportive family, no suicidal ideation or homicidal ideation. Pt is not at imminently elevated risk of harm to self or others and does not meet criteria for involuntary psychiatric admission.

## 2020-02-22 NOTE — ED BEHAVIORAL HEALTH ASSESSMENT NOTE - SUICIDE RISK FACTORS
Alcohol/Substance abuse disorders/Agitation/Severe Anxiety/Panic Psychotic disorder current/past/Agitation/Severe Anxiety/Panic/Alcohol/Substance abuse disorders

## 2020-02-22 NOTE — ED BEHAVIORAL HEALTH ASSESSMENT NOTE - VIOLENCE RISK FACTORS:
Antisocial behavior/cognition (past or present)/Substance abuse Antisocial behavior/cognition (past or present)/Violent ideation/threat/speech/Substance abuse/Noncompliance with treatment

## 2020-02-22 NOTE — ED BEHAVIORAL HEALTH ASSESSMENT NOTE - SAFETY PLAN DETAILS
call 911 or return to ED if symptoms worsen or if pt develops thoughts of harm self or others call 911 or return to ED if symptoms worsen or if pt develops thoughts of harm self or others; provided with information regarding shelters and patient stated that he would be spending the night with a cousin in the area
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
## 2020-02-22 NOTE — ED BEHAVIORAL HEALTH ASSESSMENT NOTE - OTHER
father Substance abuse superficially cooperative call [phone] if you need to speak with someone 24/7
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
## 2020-02-22 NOTE — ED PROVIDER NOTE - PATIENT PORTAL LINK FT
You can access the FollowMyHealth Patient Portal offered by [facility] by registering at the following website: [email or web address]. By joining [employer]’s FollowMyHealth portal, you will also be able to view your health information using other applications (apps) compatible with our system.

## 2020-02-22 NOTE — ED PROVIDER NOTE - NSFOLLOWUPINSTRUCTIONS_ED_ALL_ED_FT
Please follow up with your primary care doctor after you leave the emergency department so that they can follow up and conduct more testing and treatment as they deem necessary. If you have worsening signs or symptoms of what you came in to the Emergency Department today and are not able to see your doctor, go to your nearest emergency department or return to the [facility] emergency department for further care and management.

## 2020-02-22 NOTE — ED ADULT NURSE NOTE - NSIMPLEMENTINTERV_GEN_ALL_ED
Implemented All Universal Safety Interventions:  [place] to call system. Call bell, personal items and telephone within reach. Instruct patient to call for assistance. Room bathroom lighting operational. Non-slip footwear when patient is off stretcher. Physically safe environment: no spills, clutter or unnecessary equipment. Stretcher in lowest position, wheels locked, appropriate side rails in place.
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
## 2020-02-22 NOTE — ED BEHAVIORAL HEALTH ASSESSMENT NOTE - REFERRAL / APPOINTMENT DETAILS
pt again provided with information for [facility]; pt declines referrals for substance abuse treatment
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
## 2020-02-22 NOTE — ED BEHAVIORAL HEALTH ASSESSMENT NOTE - DESCRIPTION (FIRST USE, LAST USE, QUANTITY, FREQUENCY, DURATION)
see HPI Illicit "Percocet" but unclear if this is what patient was actually buying, father also reports noticing his oxycodone missing father suspects recent xanax abuse, patient denies see HPI, endorses daily use 1-2 joints/day per chart review

## 2020-02-23 NOTE — PROVIDER CONTACT NOTE (OTHER) - ASSESSMENT
IRVING contacted by JUAN Herrera for [employer] for pt being discharged home. IRVING provided RN with [email or web address] Serial Number [id number].

## 2020-06-01 ENCOUNTER — OUTPATIENT (OUTPATIENT)
Dept: OUTPATIENT SERVICES | Facility: HOSPITAL | Age: 25
LOS: 1 days | End: 2020-06-01
Payer: MEDICAID

## 2020-06-04 ENCOUNTER — EMERGENCY (EMERGENCY)
Facility: HOSPITAL | Age: 25
LOS: 1 days | Discharge: PSYCHIATRIC OR SUBSTANCE USE FACILITY | End: 2020-06-04
Admitting: EMERGENCY MEDICINE
Payer: MEDICAID

## 2020-06-04 VITALS
SYSTOLIC BLOOD PRESSURE: 108 MMHG | DIASTOLIC BLOOD PRESSURE: 72 MMHG | OXYGEN SATURATION: 100 % | RESPIRATION RATE: 18 BRPM | TEMPERATURE: 98 F | HEART RATE: 77 BPM

## 2020-06-04 VITALS
SYSTOLIC BLOOD PRESSURE: 101 MMHG | HEART RATE: 54 BPM | RESPIRATION RATE: 17 BRPM | DIASTOLIC BLOOD PRESSURE: 59 MMHG | OXYGEN SATURATION: 100 % | TEMPERATURE: 98 F

## 2020-06-04 DIAGNOSIS — F12.10 CANNABIS ABUSE, UNCOMPLICATED: ICD-10-CM

## 2020-06-04 DIAGNOSIS — F39 UNSPECIFIED MOOD [AFFECTIVE] DISORDER: ICD-10-CM

## 2020-06-04 LAB
ALBUMIN SERPL ELPH-MCNC: 4.5 G/DL — SIGNIFICANT CHANGE UP (ref 3.3–5)
ALP SERPL-CCNC: 51 U/L — SIGNIFICANT CHANGE UP (ref 40–120)
ALT FLD-CCNC: 8 U/L — SIGNIFICANT CHANGE UP (ref 4–41)
AMPHET UR-MCNC: NEGATIVE — SIGNIFICANT CHANGE UP
ANION GAP SERPL CALC-SCNC: 12 MMO/L — SIGNIFICANT CHANGE UP (ref 7–14)
APAP SERPL-MCNC: < 15 UG/ML — LOW (ref 15–25)
APPEARANCE UR: CLEAR — SIGNIFICANT CHANGE UP
AST SERPL-CCNC: 14 U/L — SIGNIFICANT CHANGE UP (ref 4–40)
BACTERIA # UR AUTO: NEGATIVE — SIGNIFICANT CHANGE UP
BARBITURATES UR SCN-MCNC: NEGATIVE — SIGNIFICANT CHANGE UP
BASOPHILS # BLD AUTO: 0.03 K/UL — SIGNIFICANT CHANGE UP (ref 0–0.2)
BASOPHILS NFR BLD AUTO: 0.4 % — SIGNIFICANT CHANGE UP (ref 0–2)
BENZODIAZ UR-MCNC: NEGATIVE — SIGNIFICANT CHANGE UP
BILIRUB SERPL-MCNC: 0.3 MG/DL — SIGNIFICANT CHANGE UP (ref 0.2–1.2)
BILIRUB UR-MCNC: NEGATIVE — SIGNIFICANT CHANGE UP
BLOOD UR QL VISUAL: NEGATIVE — SIGNIFICANT CHANGE UP
BUN SERPL-MCNC: 13 MG/DL — SIGNIFICANT CHANGE UP (ref 7–23)
CALCIUM SERPL-MCNC: 9.4 MG/DL — SIGNIFICANT CHANGE UP (ref 8.4–10.5)
CANNABINOIDS UR-MCNC: POSITIVE — SIGNIFICANT CHANGE UP
CHLORIDE SERPL-SCNC: 105 MMOL/L — SIGNIFICANT CHANGE UP (ref 98–107)
CO2 SERPL-SCNC: 25 MMOL/L — SIGNIFICANT CHANGE UP (ref 22–31)
COCAINE METAB.OTHER UR-MCNC: NEGATIVE — SIGNIFICANT CHANGE UP
COLOR SPEC: YELLOW — SIGNIFICANT CHANGE UP
CREAT SERPL-MCNC: 1.15 MG/DL — SIGNIFICANT CHANGE UP (ref 0.5–1.3)
EOSINOPHIL # BLD AUTO: 0.1 K/UL — SIGNIFICANT CHANGE UP (ref 0–0.5)
EOSINOPHIL NFR BLD AUTO: 1.4 % — SIGNIFICANT CHANGE UP (ref 0–6)
ETHANOL BLD-MCNC: < 10 MG/DL — SIGNIFICANT CHANGE UP
GLUCOSE SERPL-MCNC: 88 MG/DL — SIGNIFICANT CHANGE UP (ref 70–99)
GLUCOSE UR-MCNC: NEGATIVE — SIGNIFICANT CHANGE UP
HCT VFR BLD CALC: 41.1 % — SIGNIFICANT CHANGE UP (ref 39–50)
HGB BLD-MCNC: 13.5 G/DL — SIGNIFICANT CHANGE UP (ref 13–17)
HYALINE CASTS # UR AUTO: HIGH
IMM GRANULOCYTES NFR BLD AUTO: 0.1 % — SIGNIFICANT CHANGE UP (ref 0–1.5)
KETONES UR-MCNC: SIGNIFICANT CHANGE UP
LEUKOCYTE ESTERASE UR-ACNC: NEGATIVE — SIGNIFICANT CHANGE UP
LYMPHOCYTES # BLD AUTO: 2.04 K/UL — SIGNIFICANT CHANGE UP (ref 1–3.3)
LYMPHOCYTES # BLD AUTO: 28.8 % — SIGNIFICANT CHANGE UP (ref 13–44)
MCHC RBC-ENTMCNC: 28.2 PG — SIGNIFICANT CHANGE UP (ref 27–34)
MCHC RBC-ENTMCNC: 32.8 % — SIGNIFICANT CHANGE UP (ref 32–36)
MCV RBC AUTO: 86 FL — SIGNIFICANT CHANGE UP (ref 80–100)
METHADONE UR-MCNC: NEGATIVE — SIGNIFICANT CHANGE UP
MONOCYTES # BLD AUTO: 0.52 K/UL — SIGNIFICANT CHANGE UP (ref 0–0.9)
MONOCYTES NFR BLD AUTO: 7.3 % — SIGNIFICANT CHANGE UP (ref 2–14)
NEUTROPHILS # BLD AUTO: 4.39 K/UL — SIGNIFICANT CHANGE UP (ref 1.8–7.4)
NEUTROPHILS NFR BLD AUTO: 62 % — SIGNIFICANT CHANGE UP (ref 43–77)
NITRITE UR-MCNC: NEGATIVE — SIGNIFICANT CHANGE UP
NRBC # FLD: 0 K/UL — SIGNIFICANT CHANGE UP (ref 0–0)
OPIATES UR-MCNC: NEGATIVE — SIGNIFICANT CHANGE UP
OXYCODONE UR-MCNC: NEGATIVE — SIGNIFICANT CHANGE UP
PCP UR-MCNC: NEGATIVE — SIGNIFICANT CHANGE UP
PH UR: 6.5 — SIGNIFICANT CHANGE UP (ref 5–8)
PLATELET # BLD AUTO: 249 K/UL — SIGNIFICANT CHANGE UP (ref 150–400)
PMV BLD: 9.8 FL — SIGNIFICANT CHANGE UP (ref 7–13)
POTASSIUM SERPL-MCNC: 3.6 MMOL/L — SIGNIFICANT CHANGE UP (ref 3.5–5.3)
POTASSIUM SERPL-SCNC: 3.6 MMOL/L — SIGNIFICANT CHANGE UP (ref 3.5–5.3)
PROT SERPL-MCNC: 6.7 G/DL — SIGNIFICANT CHANGE UP (ref 6–8.3)
PROT UR-MCNC: 70 — SIGNIFICANT CHANGE UP
RBC # BLD: 4.78 M/UL — SIGNIFICANT CHANGE UP (ref 4.2–5.8)
RBC # FLD: 12.9 % — SIGNIFICANT CHANGE UP (ref 10.3–14.5)
RBC CASTS # UR COMP ASSIST: SIGNIFICANT CHANGE UP (ref 0–?)
SALICYLATES SERPL-MCNC: < 5 MG/DL — LOW (ref 15–30)
SARS-COV-2 RNA SPEC QL NAA+PROBE: SIGNIFICANT CHANGE UP
SODIUM SERPL-SCNC: 142 MMOL/L — SIGNIFICANT CHANGE UP (ref 135–145)
SP GR SPEC: 1.03 — SIGNIFICANT CHANGE UP (ref 1–1.04)
SQUAMOUS # UR AUTO: SIGNIFICANT CHANGE UP
TSH SERPL-MCNC: 1.31 UIU/ML — SIGNIFICANT CHANGE UP (ref 0.27–4.2)
UROBILINOGEN FLD QL: SIGNIFICANT CHANGE UP
WBC # BLD: 7.09 K/UL — SIGNIFICANT CHANGE UP (ref 3.8–10.5)
WBC # FLD AUTO: 7.09 K/UL — SIGNIFICANT CHANGE UP (ref 3.8–10.5)
WBC UR QL: SIGNIFICANT CHANGE UP (ref 0–?)

## 2020-06-04 PROCEDURE — 99285 EMERGENCY DEPT VISIT HI MDM: CPT | Mod: 25

## 2020-06-04 PROCEDURE — 93010 ELECTROCARDIOGRAM REPORT: CPT

## 2020-06-04 PROCEDURE — 99285 EMERGENCY DEPT VISIT HI MDM: CPT

## 2020-06-04 NOTE — ED BEHAVIORAL HEALTH ASSESSMENT NOTE - RISK ASSESSMENT
Risk factors: impulsivity, substance abuse, h/o psychosis, medication noncompliant, limited insight into symptoms. Protective factors include no suicide attempts, no access to guns, no global insomnia, supportive family, no suicidal ideation or homicidal ideation. Pt is not at imminently elevated risk of harm to self or others and does not meet criteria for involuntary psychiatric admission. Low Acute Suicide Risk Risk factors: impulsivity, substance abuse, psychosis, medication noncompliant, aggressive/violent, history of inpatient admissions, recent inpatient discharge, substance use, history of legal issues, suspected ASPD and poor judgement/insight.

## 2020-06-04 NOTE — ED BEHAVIORAL HEALTH NOTE - BEHAVIORAL HEALTH NOTE
Faxed patient's EKG, COVID results and lab results to [facility] ([phone]) for further review for admission.

## 2020-06-04 NOTE — ED BEHAVIORAL HEALTH ASSESSMENT NOTE - PSYCHIATRIC ISSUES AND PLAN (INCLUDE STANDING AND PRN MEDICATION)
Consider starting Antipsychotic and convert to [place]- defer to inpatient team; Haldol 5mg PO/IM PRN, Ativan 2mg PO/IM PRN, Benadryl 50mg Po/IM PRN Consider starting Antipsychotic and convert to [place]- defer to inpatient team; Pending QTC -Haldol 5mg PO/IM PRN, Ativan 2mg PO/IM PRN, Benadryl 50mg Po/IM PRN
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
## 2020-06-04 NOTE — ED BEHAVIORAL HEALTH ASSESSMENT NOTE - OTHER PAST PSYCHIATRIC HISTORY (INCLUDE DETAILS REGARDING ONSET, COURSE OF ILLNESS, INPATIENT/OUTPATIENT TREATMENT)
PHx of various diagnoses listed on Psyckes Bipolar 1, Schizoaffective disorder, Unspecified Psychosis, Conduct Disorder, cannabis abuse, r/o substance-induced psychosis, multiple past psych hospitalizations, most recently at [facility] 2/28-3/11 2020, chronic history of medication noncompliance, was on [place] after [place] (eJan Pierre Benavides), no history of self-injurious behavior or suicide attempts. Chronic non compliance. PPHx of various diagnoses listed on Psyckes Bipolar 1, Schizoaffective disorder, Unspecified Psychosis, Conduct Disorder, cannabis abuse, r/o substance-induced psychosis, multiple past psych hospitalizations, most recently at [facility] 2/28-3/11 2020, chronic history of medication noncompliance, was on [place] after [place] (Jean Pierre Benavides), no history of self-injurious behavior or suicide attempts. Chronic non compliance.

## 2020-06-04 NOTE — ED BEHAVIORAL HEALTH NOTE - BEHAVIORAL HEALTH NOTE
Worker called [facility] ([phone]) and spoke to Juana who took patient information and states that chart will be reviewed. Pending EKG.
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
## 2020-06-04 NOTE — ED BEHAVIORAL HEALTH ASSESSMENT NOTE - SAFETY PLAN DETAILS
call 911 or return to ED if symptoms worsen or if pt develops thoughts of harm self or others; provided with information regarding shelters and patient stated that he would be spending the night with a cousin in the area
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
## 2020-06-04 NOTE — ED ADULT NURSE NOTE - NS ED NURSE AMBULANCES2
6:10- temp now 98.6. Pt is well appearing, father has no new complaints and able to walk with normal gait. Pt is stable for discharge and follow up with medical doctor. Pt educated on care and need for follow up. Discussed anticipatory guidance and return precautions. Questions answered. I had a detailed discussion with the patient and/or guardian regarding the historical points, exam findings, and any diagnostic results supporting the discharge diagnosis. [facility]

## 2020-06-04 NOTE — ED ADULT NURSE NOTE - NSIMPLEMENTINTERV_GEN_ALL_ED
Implemented All Universal Safety Interventions:  [place] to call system. Call bell, personal items and telephone within reach. Instruct patient to call for assistance. Room bathroom lighting operational. Non-slip footwear when patient is off stretcher. Physically safe environment: no spills, clutter or unnecessary equipment. Stretcher in lowest position, wheels locked, appropriate side rails in place.

## 2020-06-04 NOTE — ED BEHAVIORAL HEALTH ASSESSMENT NOTE - SUICIDE RISK FACTORS
Alcohol/Substance abuse disorders/Agitation/Severe Anxiety/Panic/Psychotic disorder current/past/Recent onset of current/past psychiatric diagnosis/Impulsivity

## 2020-06-04 NOTE — ED BEHAVIORAL HEALTH ASSESSMENT NOTE - AXIS IV
Problem related to social environment Problem related to social environment/Problems with primary support/Occupational problems
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
## 2020-06-04 NOTE — ED BEHAVIORAL HEALTH ASSESSMENT NOTE - VIOLENCE RISK FACTORS:
Noncompliance with treatment/Violent ideation/threat/speech/Substance abuse/Antisocial behavior/cognition (past or present) Noncompliance with treatment/Substance abuse/Lack of insight into violence risk/need for treatment/Violent ideation/threat/speech/Antisocial behavior/cognition (past or present)/Impulsivity

## 2020-06-04 NOTE — ED PROVIDER NOTE - PROGRESS NOTE DETAILS
Labs reviewed, patient not in distress, nontoxic appearing and medically stable for inpatient psychiatric admission per the recommendation of ED psychiatry. Patient told by psych he will likely be admitted, now patient pacing, agitated and threatening staff.  Will provide medications.

## 2020-06-04 NOTE — ED ADULT NURSE NOTE - OBJECTIVE STATEMENT
Pt is a 23yo AA male with hx  of bipolar do. He had  a verbal altercation with dad at home and reports the he has not been compliant with meds. Said he stopped taking meds because he doesn’t like abilify. Wants to resume previous psychotropic regimen. Was calm and cooperative on arrival. Admits to past use of cannabis, but not recently. Denies alcohol use. Denies si/hi/ah/vh.  Ongoing eval in place.
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
## 2020-06-04 NOTE — ED BEHAVIORAL HEALTH ASSESSMENT NOTE - DIFFERENTIAL
Substance induced psychosis vs Schizophrenia vs Schizoaffective d/o vs Bipolar d/o    r/o antisocial PD Schizophrenia vs Schizoaffective d/o vs Bipolar d/o    r/o antisocial PD
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
## 2020-06-05 RX ORDER — DIPHENHYDRAMINE HCL 50 MG
50 CAPSULE ORAL ONCE
Refills: 0 | Status: DISCONTINUED | OUTPATIENT
Start: 2020-06-05 | End: 2020-06-08

## 2020-06-05 RX ORDER — HALOPERIDOL DECANOATE 100 MG/ML
5 INJECTION INTRAMUSCULAR ONCE
Refills: 0 | Status: DISCONTINUED | OUTPATIENT
Start: 2020-06-05 | End: 2020-06-08
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
## 2020-06-23 DIAGNOSIS — Z71.89 OTHER SPECIFIED COUNSELING: ICD-10-CM

## 2020-08-05 NOTE — ED ADULT TRIAGE NOTE - HEART RATE (BEATS/MIN)
Date of Service: 08/05/2020    PREOPERATIVE DIAGNOSIS:  History of complicated diverticulitis with contained perforation.    POSTOPERATIVE DIAGNOSIS:  Sigmoid diverticulosis with grade 1 internal hemorrhoids.    PROCEDURE:  Colonoscopy.    ANESTHESIA:  Monitored anesthesia care.    SURGEON:  Pasha Davidson MD.    ASSISTANT(S):  None.     FINDINGS:  Sigmoid diverticulosis with grade 1 internal hemorrhoids.    COMPLICATIONS:  None apparent.    CONDITION:  Stable.    ESTIMATED BLOOD LOSS:   None.    INDICATIONS FOR PROCEDURE:  The patient is a 50-year-old male who is known to us with a history of complicated diverticulitis with contained perforation who presents today for colonoscopy in preparation for surgical intervention.    DETAILS OF PROCEDURE:  The patient was brought to the endoscopy suite, was placed in left lateral decubitus position on a gurney table.  He was given monitored anesthesia care.  Digital rectal exam was performed.  No lesions were noted.  Colonoscope was introduced and passed to the cecum with minimal difficulty.  Cecum was visualized with confluence of taenia, appendiceal orifice and ileocecal valve.  No masses, lesions, or abnormalities noted in the cecum, ascending colon, hepatic flexure, transverse colon, splenic flexure, descending colon.  Throughout the sigmoid colon, there were just a few scattered small mouth diverticula, none of which were impacted.  There was an area of early stricturing between 30-35 cm.  No other lesions were noted.  The colonoscope was then retroflexed.  Some grade 1 internal hemorrhoids were noted, none of which were bandable.  Colonoscope was straightened, air was evacuated and removed.  The patient tolerated the procedure well.  He was awake in endoscopy suite, transferred to recovery room in stable condition.    Recommendations for surveillance colonoscopy are every 10 years given no history of polyps.  He will proceed with colectomy as  planned.        Dictated By: Pasha Davidson MD  Signing Provider: MD PREMA Swanson/jo ([id number])  DD: 08/05/2020 08:11:57 TD: 08/05/2020 08:42:46    Copy Sent To:    62

## 2020-11-27 NOTE — ED BEHAVIORAL HEALTH ASSESSMENT NOTE - MODE OF ARRIVAL
Patient was called to check in for his virtual appointment with Sergio.  A message was left that we will call back in a couple of min.  
Walk in / drive in

## 2021-01-07 NOTE — ED PROVIDER NOTE - CHPI ED SYMPTOMS POS
Is This A New Presentation, Or A Follow-Up?: Rash How Severe Is Your Rash?: mild What Type Of Note Output Would You Prefer (Optional)?: Standard Output ANXIETY/AGITATION/IRRITABILITY

## 2021-03-01 ENCOUNTER — OUTPATIENT (OUTPATIENT)
Dept: OUTPATIENT SERVICES | Facility: HOSPITAL | Age: 26
LOS: 1 days | End: 2021-03-01
Payer: MEDICAID

## 2021-03-01 PROCEDURE — H0002: CPT

## 2021-04-07 DIAGNOSIS — Z71.89 OTHER SPECIFIED COUNSELING: ICD-10-CM

## 2021-04-08 PROBLEM — F31.9 BIPOLAR DISORDER, UNSPECIFIED: Chronic | Status: ACTIVE | Noted: 2020-06-04

## 2021-04-08 PROBLEM — F29 UNSPECIFIED PSYCHOSIS NOT DUE TO A SUBSTANCE OR KNOWN PHYSIOLOGICAL CONDITION: Chronic | Status: ACTIVE | Noted: 2020-06-04

## 2021-08-24 NOTE — ED BEHAVIORAL HEALTH ASSESSMENT NOTE - NS ED BHA CANNABIS
OFFICE VISIT      Patient: Jenni Palacios Date of Service: 2021   : 1966 MRN: [id number]     SUBJECTIVE:     Chief Complaint   Patient presents with   • Cough     started in May. Had Covid symptoms second week of May. Cough daily basis ,phlegm is yellowto clear.Has never gotten a Covid test.   • Exposure Coronavirus (Covid-19)     anti-body lab test in  was Positive.     last seen by me in   Previously seen by Dr Pratibha Fierro - last visit in 2021  Hx of HTN, Hyperlipidemia, Obesity     Presents with chronic cough since April  + covid antibody test on 21  occ productive ; sometimes dry  occ has cough spells   States had salmonella poisoning in may 2021 and was doing a lot of cough that has persisted    Prior to cough episodes she usually has  a tickle in her throat  Worse when she is exposed to air   Worse when she is at work and she has mask on    Never smoker or no exposure to 2nd hand smoke   Never heartburn  No postnasal drip however experiences tickle in her throat prior to coughing episodes   No asthma  No SOB or wheezing   No chest pain       xray done 21 normal     Wondering why her cough has persisted and wondering if she may have irritated her throat and needs to see the ENT     She has occ chest congestion -- taken robitussin, delsym, mucinex, alicia gummies   and this has not helped much although mucinex helps to break up phlegm      REVIEW OF SYSTEMS:  Per HPI  All other systems reviewed and are negative.    ALLERGIES:  ALLERGIES:   Allergen Reactions   • Sulfadiazine Other (See Comments)       MEDICATIONS:  Current Outpatient Medications   Medication Sig   • ondansetron (ZOFRAN) 4 MG tablet Take 1 tablet by mouth every 8 hours as needed for Nausea.   • meclizine (ANTIVERT) 25 MG tablet Take 1 tablet by mouth 3 times daily as needed for Dizziness.   • amLODIPine (NORVASC) 10 MG tablet Take 1 tablet by mouth every day for blood pressure   • simvastatin (ZOCOR) 20 MG tablet  Take 1 tablet by mouth every day for cholesterol   • cetirizine (ZyrTEC) 10 MG tablet 1 tab by mouth daily for allergies for 1-2 weeks then as needed-- may get over the counter   • benzonatate (TESSALON PERLES) 100 MG capsule 1-2 caps by mouth three times a day with meals for 5 days then as needed for  chest congestion-   • fluticasone (FLONASE) 50 MCG/ACT nasal spray 2 sprays in each nostril once daily for 1-2 weeks then as needed- may get over the counter     No current facility-administered medications for this visit.       Medication(s) reviewed and reconciliation done with patient and/or family during this visit.       PAST MEDICAL HISTORY:  Past Medical History:   Diagnosis Date   • Carbuncle    • Essential (primary) hypertension    • Hearing loss of right ear    • Hyperlipidemia    • Methicillin resistant Staphylococcus aureus infection    • Otitis media    • Otorrhea    • Vaginitis        PAST SURGICAL HISTORY:  History reviewed. No pertinent surgical history.    FAMILY HISTORY:  No family history on file.    SOCIAL HISTORY:  Social History     Tobacco Use   • Smoking status: Never Smoker   • Smokeless tobacco: Never Used   Vaping Use   • Vaping Use: never used   Substance Use Topics   • Alcohol use: Never   • Drug use: Never     Social History     Social History Narrative    Pt is an administrative nurse. 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 No distress.     No coughing  episode except for once -- she brought up clear phlegm    HENT:   Head: Normocephalic and atraumatic.   Eyes: Conjunctivae  clear. No icterus  Ears: TMI Clear ear canals    Neck: Neck supple. No LAD    Nose-- nasal mucosa is slightly boggy with patent nasal passages  No sinus TTP    Oropharynx-- without any erythema or exudates     Cardiovascular: Normal rate, regular rhythm    Pulmonary/Chest: Effort normal and breath sounds normal. No respiratory distress No wheeze or crackles    Abdomen: Soft.  Non Tender to palpation. Bowel sounds are normal.   Musculoskeletal: Normal range of motion.   Neurological:  Alert and oriented to person, place, and time.   Skin: Skin is warm. No rash    ASSESSMENT     1. Chronic cough    2. Allergic rhinitis, unspecified seasonality, unspecified trigger    3. Chest congestion    4. Exposure to COVID-19 virus        PLAN:     This is a 54 year old female who has hx of exposure to covid-- still with persistent cough that may be related to allergic rhinitis per history and examination finding   -- advised antihistamine-- claritin, zyrtec or allegra  --- add flonase nasal spray as prescribed  --- rx tessalon perles for chest congestion   -- recent cxr normal  -- check covid test today  -- advised no contra- indication to getting the covid 19 shot today   - - pt prefers to manage or treat chronic cough and then get the covid 19 vaccination   -- pt plans to get covid 19 vacc in 2-3 weeks       Chronic cough  - RAPID SARS-COV-2 BY PCR  - cetirizine (ZyrTEC) 10 MG tablet; 1 tab by mouth daily for allergies for 1-2 weeks then as needed-- may get over the counter  Dispense: 30 tablet; Refill: 0  - benzonatate (TESSALON PERLES) 100 MG capsule; 1-2 caps by mouth three times a day with meals for 5 days then as needed for  chest congestion-  Dispense: 30 capsule; Refill: 0    Allergic rhinitis, unspecified seasonality, unspecified trigger  - cetirizine (ZyrTEC) 10 MG tablet; 1 tab by mouth daily for allergies for 1-2 weeks then as needed-- may get over the counter  Dispense: 30 tablet; Refill: 0  - fluticasone (FLONASE)  50 MCG/ACT nasal spray; 2 sprays in each nostril once daily for 1-2 weeks then as needed- may get over the counter  Dispense: 16 g; Refill: 0    Chest congestion  - benzonatate (TESSALON PERLES) 100 MG capsule; 1-2 caps by mouth three times a day with meals for 5 days then as needed for  chest congestion-  Dispense: 30 capsule; Refill: 0    Exposure to COVID-19 virus  - RAPID SARS-COV-2 BY PCR    Waiver form filled out ---   Patient to have chronic cough managed. She is more comfortable with getting the covid-19 vaccination in 2-3 weeks after she has had this chronic cough managed or controlled     Consider repeat cxr and antibiotics if no improvement at next visit     Future Appointments   Date Time Provider Department Center   10/21/2021  3:20 PM Raiza Blanchard MD [id number]          Total time spent caring for patient Today:  50 minutes  This includes chart review and discussion with patient,documentation,  Answering patient's questions and concerns-educating patient/family and referring/communicating with other health care professionals.    Patient and/or family/representative agreed to follow-up as told or instructed to make sooner appointment if necessary.    Instructions provided as documented in the AVS.  Patient and/or family/representative express(es) understanding and agrees with plan.      Raiza Blanchard MD       Yes

## 2021-12-01 PROCEDURE — G9005: CPT

## 2022-01-01 NOTE — ED ADULT TRIAGE NOTE - TEMPERATURE IN FAHRENHEIT (DEGREES F)
97 You can access the FollowMyHealth Patient Portal offered by [facility] by registering at the following website: [email or web address]. By joining [employer]’s FollowMyHealth portal, you will also be able to view your health information using other applications (apps) compatible with our system.

## 2022-05-31 NOTE — ED BEHAVIORAL HEALTH ASSESSMENT NOTE - AFFECT CONGRUENCE
Daily Note     Today's date: 2022  Patient name: Noel Flynn  : 1966  MRN: [id number]  Referring provider: BESSIE Cosme  Dx:   Encounter Diagnosis     ICD-10-CM    1  Pain and swelling of right knee  M25 561     M25 461                   Subjective: patient reports that she has been busier lately and sometimes has increase in pain, but it hasn't been keeping her up at night  She reports overall still feeling improvement and not as limited with her activity  Objective: See treatment diary below      Assessment: Patient noted some increase in knee pain with lateral sliders and squats  She presented with increase in tenderness along mid IT band compared to previous session  She noted being a little sore near end of session so recommended regular quad stretching throughout the day and using ice packs for pain relief  Patient required cues for correct form with sliders  Patient would benefit from continued skilled PT per plan of care  Plan: Continue per plan of care  Progressed closed chain strengthening as able  Lateral walks next visit       Precautions: EPOC 22    Manuals 5/3 5/5 5/10 5/12 5/19 5/25 5/26 5/31     A-P mobs tib femoral jt gr 3-4 MIKEY      MIKEY      Knee ext MWM  MIKEY     Prone flexion MIKEY      STM quad  MIKEY MIKEY MIKEY MIKEY MIKEY MIKEY MIKEY     IASTM IT band  MIKEY MIKEY MIKEY MIKEY MIKEY MIKEY MIKEY     Neuro Re-Ed                          SLS    3x30" 3x30" foam 3x30" foam 3x30" foam 3x30" foam     steamboats    10 ea 10 ea 10 ea foam 10 ea foam                                Ther Ex             Heel slides  5x10" 5x15"          Elliptical        5' lvl 1      bike  5' lvl 1 8' lvl 1 8' lvl 1 8' lvl 2 10' lvl 2  10' lvl 3-4     Pt edu on plan of care, pathology, home program 8' 5'      3'     Hamstring stretch       3x30"      Prone quad stretch 3x15" 3x15" 5x20" 3x30" 3x30" 3x30" 3x30" 3x30"     SLR abd  2x10 2x10 2x10 1# 2x10 2# 3x10 2# 3x10 2#      SLR 10x 10x 2x10 2x10 1# 2x10 2# 3x10 2# 3x10 2# 3x10 2#     clamshells   2x10 rtb 2x10 rtb 3x10 gtb 3x10 gtb       Bridges with abd  10x 2x10 rtb 3x10 rtb 3x10 gtb 3x10 gtb  3x10 gtb     Adductor squeeze  15x           gastroc stretch       3x30" 3x30"                  Ther Activity             Lateral slider       10x 10x     Posterior slider       10x 10x     Lateral walks        nv     Lateral step ups      lvl 2 10x       minisquats   10x          Wall squats    10x 2x10 gtb 2x15 gtb  2x12 gtb     [employer]

## 2022-06-21 NOTE — ED BEHAVIORAL HEALTH ASSESSMENT NOTE - NS ED BHA MED ROS ENT MOUTH
What Is The Reason For Today's Visit?: Skin Cancer Screening Exam
What Is The Reason For Today's Visit? (Being Monitored For X): concerning skin lesions on an annual basis
How Severe Are Your Spot(S)?: mild
No complaints

## 2022-06-29 NOTE — ED BEHAVIORAL HEALTH ASSESSMENT NOTE - NS ED BHA MSE GENERAL APPEARANCE
presents with acute CP pain w radiation down L arm - trops elevated w/ no acute EKG changes   - admitted to to tele for NSTEMI , Few beats - NSVT on Tele. Remains non anginal   -As per discussion with cardio, no plan for outpatient cath 2/2 persistent SOB/Fluid overload  -trop initially 342.6, up trended to over 22K, came down to 15 K   - EKG Afib with underlying LBBB; unchanged from previous  -c/w ASA 81mg qD, Toprol xl 50mg qD, PPI - Lowered statin dose to atorvastatin 20mg qd per cardio   - TTE: EF 35-40%, LV hypokinesis  -continuous tele monitoring  -cardio Dr. Blair d/w no Cath as still fluid overload   -continue home Lasix 40 mg BID  -DNR DNI order placed, JAMES in chart Other/Well developed

## 2023-01-24 NOTE — ED PROVIDER NOTE - MDM ORDERS SUBMITTED SELECTION
Pediatric Endocrinology Follow uP        Patient: Yumiko Jacobo Date of Service: 2023   : 2012 MRN: [id number]    Referring Provider: Reese Braun MD      SUBJECTIVE:   HISTORY OF PRESENT ILLNESS:  Yumiko Jacobo is a 10 year old female who presents today for precocious puberty. Started breast development at 9 years of age. Wearing a bra now. Has pubic hair. Has not started menses. Does not complain of headaches. Has a history of autism. Non-verbal. Other review of systems are negative.      PAST MEDICAL HISTORY:  Past Medical History:   Diagnosis Date   • Autism    • Non-verbal learning disorder        MEDICATIONS:  No current outpatient medications on file.     No current facility-administered medications for this visit.       ALLERGIES:  ALLERGIES:  No Known Allergies    PAST SURGICAL HISTORY:  History reviewed. No pertinent surgical history.    FAMILY HISTORY:  Family History   Problem Relation Age of Onset   • Patient is unaware of any medical problems Mother    • Patient is unaware of any medical problems Father    • Cancer Paternal Grandmother         cervical       SOCIAL HISTORY:  Social History     Tobacco Use   • Smoking status: Never   • Smokeless tobacco: Never   Vaping Use   • Vaping Use: never used       Review of Systems   Constitutional: Negative.    HENT: Negative.    Eyes: Negative.    Respiratory: Negative.    Cardiovascular: Negative.    Gastrointestinal: Negative.    Endocrine: Negative.    Genitourinary: Negative.    Musculoskeletal: Negative.    Skin: Negative.    Allergic/Immunologic: Negative.    Neurological: Negative.    Hematological: Negative.          OBJECTIVE:   Physical Exam  Constitutional:       General: She is active.      Appearance: She is well-developed.   HENT:      Mouth/Throat:      Mouth: Mucous membranes are moist.   Cardiovascular:      Rate and Rhythm: Normal rate.   Pulmonary:      Effort: Pulmonary effort is normal.      Breath sounds: Normal breath  sounds and air entry.   Abdominal:      General: Bowel sounds are normal.      Palpations: Abdomen is soft.   Musculoskeletal:         General: Normal range of motion.      Cervical back: Neck supple.   Skin:     General: Skin is warm.   Neurological:      Mental Status: She is alert.         Visit Vitals  Pulse 110   Temp 99 °F (37.2 °C) (Temporal)   Ht 4' 8.14\" (1.426 m)   Wt 40.9 kg (90 lb 1.6 oz)   SpO2 99%   BMI 20.10 kg/m²         DIAGNOSTIC STUDIES:   LAB RESULTS:  07/2022:  Bone age 10 years 10 months. Chronological age 10 years 3 months.     No visits with results within 1 Month(s) from this visit.   Latest known visit with results is:   Lab Requisition on 07/26/2022   Component Date Value Ref Range Status   • 17 OH Progesterone 07/26/2022 28.20  <=71.00 ng/dL Final      INTERPRETIVE INFORMATION for 17-Hydroxyprogesterone in girls:    Ulises Stage I              Less than or equal to 74 ng/dL  Ulises Stage II             Less than or equal to 164 ng/dL  Ulises Stage III            13 to 209 ng/dL  Ulises Stage IV and V       7 to 170 ng/dL  REFERENCE INTERVAL: 17-Hydroxyprogesterone Qnt, HPLC-MS/MS    Access complete set of age- and/or gender-specific reference   intervals for this test in the [facility] Test Directory   ([employer]).    This test was developed and its performance characteristics   determined by [facility]. It has not been cleared or   approved by the US Food and Drug Administration. This test was   performed in a CLIA certified laboratory and is intended for   clinical purposes.  Performed By: [facility]  [address] [place] [id number]  : Carlos Graham MD, PhD   • Testosterone, Free by LC MS Ms 07/26/2022 <1.0  0.6 - 1.8 pg/mL Final    Comment: REFERENCE INTERVAL: Testosterone, Free by Mass Spec                       Male                Female  Ulises Stage I    Less than 3.8 pg/mL  Less than 2.2 pg/mL  Ulises Stage II   0.3-21 pg/mL          0.4-4.5 pg/mL  Ulises Stage III  1-98 pg/mL           1.3-7.5 pg/mL  Ulises Stage IV    pg/mL         1.1-15.5 pg/mL  Ulises Stage V     pg/mL         0.8-9.2 pg/mL    INTERPRETIVE INFORMATION: Testosterone, Free by Mass Spec    Free testosterone concentration is calculated using total   testosterone (measured by mass spectrometry) and the binding   constant of testosterone and sex hormone-binding globulin (SHBG).     For individuals on testosterone-suppressing hormone therapies   (e.g., antiandrogens or estrogens), refer to cisgender female   reference intervals. For a complete set of all established   reference intervals, refer to [facility]/Tests/Pub/[id number].    This test was developed and its performance characteristics   determined by [facility]. It has not                            been cleared or   approved by the US Food and Drug Administration. This test was   performed in a CLIA certified laboratory and is intended for   clinical purposes.  Performed By: [facility]  [address] [place] [id number]  : Carlos Graham MD, PhD       Assessment AND PLAN:   This is a 10 year old year-old female who presents with precocious puberty. Bloodwork and bone age indicated that she has not started puberty.    Assessment:  1. Precocious puberty      Plan:  1. Recommend referral to Pediatric Gynecology once she starts menses.   2. Follow-up Endocrine Clinic prn.      Manuel Galindo DO    Letter faxed to Reese Braun MD.                 Not Applicable

## 2023-03-02 ENCOUNTER — EMERGENCY (EMERGENCY)
Facility: HOSPITAL | Age: 28
LOS: 1 days | Discharge: HOME | End: 2023-03-02
Admitting: EMERGENCY MEDICINE
Payer: MEDICAID

## 2023-03-02 VITALS
SYSTOLIC BLOOD PRESSURE: 121 MMHG | RESPIRATION RATE: 16 BRPM | TEMPERATURE: 99 F | DIASTOLIC BLOOD PRESSURE: 76 MMHG | OXYGEN SATURATION: 99 % | HEART RATE: 82 BPM

## 2023-03-02 PROCEDURE — 99284 EMERGENCY DEPT VISIT MOD MDM: CPT

## 2023-03-02 RX ORDER — ACETAMINOPHEN 500 MG
975 TABLET ORAL ONCE
Refills: 0 | Status: COMPLETED | OUTPATIENT
Start: 2023-03-02 | End: 2023-03-02

## 2023-03-02 RX ORDER — IBUPROFEN 200 MG
600 TABLET ORAL ONCE
Refills: 0 | Status: COMPLETED | OUTPATIENT
Start: 2023-03-02 | End: 2023-03-02

## 2023-03-02 RX ORDER — CHLORHEXIDINE GLUCONATE 213 G/1000ML
15 SOLUTION TOPICAL
Qty: 900 | Refills: 0
Start: 2023-03-02 | End: 2023-03-31

## 2023-03-02 RX ADMIN — Medication 975 MILLIGRAM(S): at 21:24

## 2023-03-02 RX ADMIN — Medication 600 MILLIGRAM(S): at 21:26

## 2023-03-02 RX ADMIN — Medication 1 TABLET(S): at 21:26

## 2023-03-02 NOTE — ED PROVIDER NOTE - PATIENT PORTAL LINK FT
You can access the FollowMyHealth Patient Portal offered by [facility] by registering at the following website: [email or web address]. By joining [employer]’s FollowMyHealth portal, you will also be able to view your health information using other applications (apps) compatible with our system.

## 2023-03-02 NOTE — ED PROVIDER NOTE - NSFOLLOWUPINSTRUCTIONS_ED_ALL_ED_FT
Follow up at dental (call [phone] to make an appointment) as soon as possible.   Take Augmentin, one pill, every 12 hours for 7 days.  Take Motrin 600 mg every 8 hours as needed for moderate pain or fevers -- take with food.  Take Tylenol 650mg (Two 325 mg pills) every 4-6 hours as needed for pain or fevers.  Advance activity as tolerated.  Continue all previously prescribed medications as directed unless otherwise instructed.  Follow up with your primary care physician in 48-72 hours- bring copies of your results.  Return to the ER for worsening or persistent symptoms, and/or ANY NEW OR CONCERNING SYMPTOMS: fever, facial swelling, gum swelling. If you have issues obtaining follow up, please call: [phone] to obtain a doctor or specialist who takes your insurance in your area.  You may call [phone] to make an appointment with the internal medicine clinic.

## 2023-03-02 NOTE — ED PROVIDER NOTE - CLINICAL SUMMARY MEDICAL DECISION MAKING FREE TEXT BOX
26 yo M here for toothache for a few days. no swelling. no fever. VSS. able to tolerate PO. last week pt finished amoxicillin and felt better.  will give Augmentin, tylenol, motrin, and dental clinic f/u. 26 yo M here for toothache for a few days. no fever. VSS. well appearing. no facial swelling. no gum swelling. no concern for dental abscess at this time. able to tolerate PO. last week pt finished amoxicillin and felt better.  will give Augmentin, tylenol, motrin, and dental clinic f/u.

## 2023-03-02 NOTE — ED PROVIDER NOTE - PRINCIPAL DIAGNOSIS
PT BROUGHT IN BY MOM S/P INGESTION OF WATER BEADS YESTERDAY AROUND 1800. PT HAD BM TODAY DIAPER SHOWED SEVERAL WATER BEADS. MOTHER UNSURE HOW MUCH WAS INGESTED. PT BEHAVIOR IS APPROPRIATE TO AGE. NO APPARENT SIGNS DISSTRESS.  PER MOM PT TOLERATING PO FOOD A Toothache

## 2023-03-02 NOTE — ED ADULT NURSE NOTE - OBJECTIVE STATEMENT
Received pt in intake 10A. pt A&OX4, amb. Pt c/o right sided lower tooth pain beginning 1 week ago worsening. states has not seen a dentist for compliant. pt in no distress. denies any other complaints. no fever, headache, dizziness, n/v. no mouth swelling. airway patent respirations are equal and nonlabored, no respiratory distress noted. pt stable, medicated as per orders. awaiting further plan

## 2023-03-02 NOTE — ED PROVIDER NOTE - OBJECTIVE STATEMENT
28 yo M with bipolar, substance abuse, here for right sided toothache for a few days. h/o root canal procedure but it was not finished. pt had seen a dentist a week ago for left sided toothache and was given amoxicillin and got better. denies fever, chills, dysphagia, facial swelling.
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
## 2023-11-18 ENCOUNTER — EMERGENCY (EMERGENCY)
Facility: HOSPITAL | Age: 28
LOS: 1 days | Discharge: ANOTHER HOSPITAL | End: 2023-11-18
Attending: EMERGENCY MEDICINE | Admitting: EMERGENCY MEDICINE
Payer: MEDICAID

## 2023-11-18 VITALS
HEART RATE: 65 BPM | TEMPERATURE: 98 F | SYSTOLIC BLOOD PRESSURE: 110 MMHG | OXYGEN SATURATION: 100 % | DIASTOLIC BLOOD PRESSURE: 60 MMHG | RESPIRATION RATE: 17 BRPM

## 2023-11-18 VITALS
OXYGEN SATURATION: 98 % | HEART RATE: 80 BPM | TEMPERATURE: 98 F | SYSTOLIC BLOOD PRESSURE: 127 MMHG | RESPIRATION RATE: 20 BRPM | DIASTOLIC BLOOD PRESSURE: 84 MMHG

## 2023-11-18 DIAGNOSIS — F25.0 SCHIZOAFFECTIVE DISORDER, BIPOLAR TYPE: ICD-10-CM

## 2023-11-18 LAB
ALBUMIN SERPL ELPH-MCNC: 4.6 G/DL — SIGNIFICANT CHANGE UP (ref 3.3–5)
ALBUMIN SERPL ELPH-MCNC: 4.6 G/DL — SIGNIFICANT CHANGE UP (ref 3.3–5)
ALP SERPL-CCNC: 55 U/L — SIGNIFICANT CHANGE UP (ref 40–120)
ALP SERPL-CCNC: 55 U/L — SIGNIFICANT CHANGE UP (ref 40–120)
ALT FLD-CCNC: 16 U/L — SIGNIFICANT CHANGE UP (ref 4–41)
ALT FLD-CCNC: 16 U/L — SIGNIFICANT CHANGE UP (ref 4–41)
ANION GAP SERPL CALC-SCNC: 13 MMOL/L — SIGNIFICANT CHANGE UP (ref 7–14)
ANION GAP SERPL CALC-SCNC: 13 MMOL/L — SIGNIFICANT CHANGE UP (ref 7–14)
APAP SERPL-MCNC: <10 UG/ML — LOW (ref 15–25)
APAP SERPL-MCNC: <10 UG/ML — LOW (ref 15–25)
AST SERPL-CCNC: 20 U/L — SIGNIFICANT CHANGE UP (ref 4–40)
AST SERPL-CCNC: 20 U/L — SIGNIFICANT CHANGE UP (ref 4–40)
BILIRUB SERPL-MCNC: 0.5 MG/DL — SIGNIFICANT CHANGE UP (ref 0.2–1.2)
BILIRUB SERPL-MCNC: 0.5 MG/DL — SIGNIFICANT CHANGE UP (ref 0.2–1.2)
BUN SERPL-MCNC: 12 MG/DL — SIGNIFICANT CHANGE UP (ref 7–23)
BUN SERPL-MCNC: 12 MG/DL — SIGNIFICANT CHANGE UP (ref 7–23)
CALCIUM SERPL-MCNC: 9.2 MG/DL — SIGNIFICANT CHANGE UP (ref 8.4–10.5)
CALCIUM SERPL-MCNC: 9.2 MG/DL — SIGNIFICANT CHANGE UP (ref 8.4–10.5)
CHLORIDE SERPL-SCNC: 105 MMOL/L — SIGNIFICANT CHANGE UP (ref 98–107)
CHLORIDE SERPL-SCNC: 105 MMOL/L — SIGNIFICANT CHANGE UP (ref 98–107)
CO2 SERPL-SCNC: 24 MMOL/L — SIGNIFICANT CHANGE UP (ref 22–31)
CO2 SERPL-SCNC: 24 MMOL/L — SIGNIFICANT CHANGE UP (ref 22–31)
CREAT SERPL-MCNC: 1.03 MG/DL — SIGNIFICANT CHANGE UP (ref 0.5–1.3)
CREAT SERPL-MCNC: 1.03 MG/DL — SIGNIFICANT CHANGE UP (ref 0.5–1.3)
EGFR: 103 ML/MIN/1.73M2 — SIGNIFICANT CHANGE UP
EGFR: 103 ML/MIN/1.73M2 — SIGNIFICANT CHANGE UP
ETHANOL SERPL-MCNC: <10 MG/DL — SIGNIFICANT CHANGE UP
ETHANOL SERPL-MCNC: <10 MG/DL — SIGNIFICANT CHANGE UP
GLUCOSE SERPL-MCNC: 82 MG/DL — SIGNIFICANT CHANGE UP (ref 70–99)
GLUCOSE SERPL-MCNC: 82 MG/DL — SIGNIFICANT CHANGE UP (ref 70–99)
HCT VFR BLD CALC: 41.3 % — SIGNIFICANT CHANGE UP (ref 39–50)
HCT VFR BLD CALC: 41.3 % — SIGNIFICANT CHANGE UP (ref 39–50)
HGB BLD-MCNC: 13.5 G/DL — SIGNIFICANT CHANGE UP (ref 13–17)
HGB BLD-MCNC: 13.5 G/DL — SIGNIFICANT CHANGE UP (ref 13–17)
MCHC RBC-ENTMCNC: 27.5 PG — SIGNIFICANT CHANGE UP (ref 27–34)
MCHC RBC-ENTMCNC: 27.5 PG — SIGNIFICANT CHANGE UP (ref 27–34)
MCHC RBC-ENTMCNC: 32.7 GM/DL — SIGNIFICANT CHANGE UP (ref 32–36)
MCHC RBC-ENTMCNC: 32.7 GM/DL — SIGNIFICANT CHANGE UP (ref 32–36)
MCV RBC AUTO: 84.1 FL — SIGNIFICANT CHANGE UP (ref 80–100)
MCV RBC AUTO: 84.1 FL — SIGNIFICANT CHANGE UP (ref 80–100)
NRBC # BLD: 0 /100 WBCS — SIGNIFICANT CHANGE UP (ref 0–0)
NRBC # BLD: 0 /100 WBCS — SIGNIFICANT CHANGE UP (ref 0–0)
NRBC # FLD: 0 K/UL — SIGNIFICANT CHANGE UP (ref 0–0)
NRBC # FLD: 0 K/UL — SIGNIFICANT CHANGE UP (ref 0–0)
PLATELET # BLD AUTO: 285 K/UL — SIGNIFICANT CHANGE UP (ref 150–400)
PLATELET # BLD AUTO: 285 K/UL — SIGNIFICANT CHANGE UP (ref 150–400)
POTASSIUM SERPL-MCNC: 3.6 MMOL/L — SIGNIFICANT CHANGE UP (ref 3.5–5.3)
POTASSIUM SERPL-MCNC: 3.6 MMOL/L — SIGNIFICANT CHANGE UP (ref 3.5–5.3)
POTASSIUM SERPL-SCNC: 3.6 MMOL/L — SIGNIFICANT CHANGE UP (ref 3.5–5.3)
POTASSIUM SERPL-SCNC: 3.6 MMOL/L — SIGNIFICANT CHANGE UP (ref 3.5–5.3)
PROT SERPL-MCNC: 6.9 G/DL — SIGNIFICANT CHANGE UP (ref 6–8.3)
PROT SERPL-MCNC: 6.9 G/DL — SIGNIFICANT CHANGE UP (ref 6–8.3)
RBC # BLD: 4.91 M/UL — SIGNIFICANT CHANGE UP (ref 4.2–5.8)
RBC # BLD: 4.91 M/UL — SIGNIFICANT CHANGE UP (ref 4.2–5.8)
RBC # FLD: 13.5 % — SIGNIFICANT CHANGE UP (ref 10.3–14.5)
RBC # FLD: 13.5 % — SIGNIFICANT CHANGE UP (ref 10.3–14.5)
SALICYLATES SERPL-MCNC: <0.3 MG/DL — LOW (ref 15–30)
SALICYLATES SERPL-MCNC: <0.3 MG/DL — LOW (ref 15–30)
SARS-COV-2 RNA SPEC QL NAA+PROBE: SIGNIFICANT CHANGE UP
SARS-COV-2 RNA SPEC QL NAA+PROBE: SIGNIFICANT CHANGE UP
SODIUM SERPL-SCNC: 142 MMOL/L — SIGNIFICANT CHANGE UP (ref 135–145)
SODIUM SERPL-SCNC: 142 MMOL/L — SIGNIFICANT CHANGE UP (ref 135–145)
TOXICOLOGY SCREEN, DRUGS OF ABUSE, SERUM RESULT: SIGNIFICANT CHANGE UP
TOXICOLOGY SCREEN, DRUGS OF ABUSE, SERUM RESULT: SIGNIFICANT CHANGE UP
TSH SERPL-MCNC: 0.67 UIU/ML — SIGNIFICANT CHANGE UP (ref 0.27–4.2)
TSH SERPL-MCNC: 0.67 UIU/ML — SIGNIFICANT CHANGE UP (ref 0.27–4.2)
WBC # BLD: 5.6 K/UL — SIGNIFICANT CHANGE UP (ref 3.8–10.5)
WBC # BLD: 5.6 K/UL — SIGNIFICANT CHANGE UP (ref 3.8–10.5)
WBC # FLD AUTO: 5.6 K/UL — SIGNIFICANT CHANGE UP (ref 3.8–10.5)
WBC # FLD AUTO: 5.6 K/UL — SIGNIFICANT CHANGE UP (ref 3.8–10.5)

## 2023-11-18 PROCEDURE — 99285 EMERGENCY DEPT VISIT HI MDM: CPT

## 2023-11-18 PROCEDURE — 93010 ELECTROCARDIOGRAM REPORT: CPT

## 2023-11-18 RX ORDER — RISPERIDONE 4 MG/1
2 TABLET ORAL
Refills: 0 | Status: DISCONTINUED | OUTPATIENT
Start: 2023-11-18 | End: 2023-11-21

## 2023-11-18 RX ORDER — HALOPERIDOL DECANOATE 100 MG/ML
5 INJECTION INTRAMUSCULAR ONCE
Refills: 0 | Status: COMPLETED | OUTPATIENT
Start: 2023-11-18 | End: 2023-11-18

## 2023-11-18 RX ORDER — DIPHENHYDRAMINE HCL 50 MG
50 CAPSULE ORAL ONCE
Refills: 0 | Status: COMPLETED | OUTPATIENT
Start: 2023-11-18 | End: 2023-11-18

## 2023-11-18 RX ADMIN — Medication 50 MILLIGRAM(S): at 13:19

## 2023-11-18 RX ADMIN — HALOPERIDOL DECANOATE 5 MILLIGRAM(S): 100 INJECTION INTRAMUSCULAR at 13:19

## 2023-11-18 RX ADMIN — Medication 2 MILLIGRAM(S): at 13:19

## 2023-11-18 NOTE — ED ADULT NURSE NOTE - OBJECTIVE STATEMENT
Received pt in  pt bought in for erratic behaviour pt hyperverbal appears maniac pt denies si/hi/avh presently, safety & comfort measures maintained eval on going.

## 2023-11-18 NOTE — ED ADULT NURSE NOTE - NSFALLUNIVINTERV_ED_ALL_ED
Bed/Stretcher in lowest position, wheels locked, appropriate side rails in place/Call bell, personal items and telephone in reach/Instruct patient to call for assistance before getting out of bed/chair/stretcher/Non-slip footwear applied when patient is off stretcher/[place] to call system/Physically safe environment - no spills, clutter or unnecessary equipment/Purposeful proactive rounding/Room/bathroom lighting operational, light cord in reach

## 2023-11-18 NOTE — ED BEHAVIORAL HEALTH ASSESSMENT NOTE - LEGAL HISTORY
history of arrests for stealing and reynoso larceny history of arrests for stealing and reynoso larceny.

## 2023-11-18 NOTE — ED ADULT TRIAGE NOTE - CHIEF COMPLAINT QUOTE
EMS states" father called us as patient is speaking to him self and not taking his psych meds for a long time. h/o Bipolar. patient was d/c from [facility]. patient came in handcuffs and is loud and cooperative in triage.

## 2023-11-18 NOTE — ED BEHAVIORAL HEALTH ASSESSMENT NOTE - DETAILS
father nasreen Denies active/passive suicidal thoughts, denies history of suicide attempts. Per protocol

## 2023-11-18 NOTE — ED BEHAVIORAL HEALTH ASSESSMENT NOTE - DIFFERENTIAL
Schizophrenia vs Schizoaffective d/o vs Bipolar d/o    r/o antisocial PD Schizophrenia vs Schizoaffective d/o vs Bipolar d/o

## 2023-11-18 NOTE — ED BEHAVIORAL HEALTH ASSESSMENT NOTE - OTHER PAST PSYCHIATRIC HISTORY (INCLUDE DETAILS REGARDING ONSET, COURSE OF ILLNESS, INPATIENT/OUTPATIENT TREATMENT)
PPHx of various diagnoses listed on Psyckes Bipolar 1, Schizoaffective disorder, Unspecified Psychosis, Conduct Disorder, cannabis abuse, r/o substance-induced psychosis, multiple past psych hospitalizations, most recently at [facility] 2/28-3/11 2020, chronic history of medication noncompliance, was on [place] after [place] (Jean Pierre Benavides), no history of self-injurious behavior or suicide attempts. Chronic non compliance.
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
## 2023-11-18 NOTE — ED BEHAVIORAL HEALTH ASSESSMENT NOTE - PSYCHIATRIC ISSUES AND PLAN (INCLUDE STANDING AND PRN MEDICATION)
Consider starting Antipsychotic and convert to [place]- defer to inpatient team; Pending QTC -Haldol 5mg PO/IM PRN, Ativan 2mg PO/IM PRN, Benadryl 50mg Po/IM PRN See Plan See Plan above
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
## 2023-11-18 NOTE — ED ADULT NURSE NOTE - CHIEF COMPLAINT QUOTE
EMS states" father called us as patient is speaking to him self and not taking his psych meds for a long time. h/o Bipolar. patient was d/c from [facility]. patient came in handcuffs and is loud and cooperative in triage.

## 2023-11-18 NOTE — ED BEHAVIORAL HEALTH NOTE - BEHAVIORAL HEALTH NOTE
IRVING alerted by psychiatry that pt meets criteria for inpatient  admission. [facility] and [facility] without bed availability. IRVING contacted [facility] who advised of male bed availability. IRVING faxed clinicals.     IRVING later alerted by [facility] (p:[phone]) that pt has been accepted (accepting [facility]). Sherley advised that pt is insured under [place] Medicaid and will NOT require authorization. IRVING alerted psychiatry team of acceptance and hand-off information.     No other SW needs identified at this time. IRVING alerted by psychiatry that pt meets criteria for inpatient  admission. [facility] and [facility] without bed availability. IRVING contacted [facility] who advised of male bed availability. IRVING faxed clinicals.     IRVING later alerted by Sherley (p:[phone]) that pt has been accepted (accepting [facility]). Sherley advised that pt is insured under [place] Medicaid and will NOT require authorization. IRVING alerted psychiatry team of acceptance and hand-off information.     IRVING alerted pt's father Jerry ([phone]) of transfer.     No other SW needs identified at this time.
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
## 2023-11-18 NOTE — ED BEHAVIORAL HEALTH NOTE - BEHAVIORAL HEALTH NOTE
Per triage note, father called us as patient is speaking to him self and not taking his psych meds for a long time. SW contacted pt's father Jerry ([phone]) for collateral.     Per pt's father, pt has been verbally aggressive and abusive. Pt's father believes he has not been compliant with psychiatric medications since June 2023 after being discharged from [facility]. Pt has been talking to himself and suddenly terminated his employment. Pt has progressively become worse over the last 1-2 weeks. Pt has been "turning his room upside down" with accusatory behavior towards his father. Pt's father has not felt safe in the home and has been staying elsewhere. Pt has been calling the pt throughout the night having casual conversation not recognizing it is the middle of the night. Pt's father attempted to have mobile crisis intervene . At baseline pt was a "reliable and good kid".     Pt unable to identify diagnosis, medications or outpatient psychiatric team. IRVING discussed with psychiatry.

## 2023-11-18 NOTE — ED PROVIDER NOTE - OBJECTIVE STATEMENT
29 y/o M hx Schizoaffective D/O BIBA secondary to aggression and acting out behaviors at home.  Appears labile, paranoid and  internally preoccupied.  Admits to medication noncompliance.   No evidence of physical injuries, broken  skin or deformities. Denies SI/HI/AH/VH. Denies use of alochol or illicit drugs. Denies falling, punching or kicking any objects.

## 2023-12-03 ENCOUNTER — INPATIENT (INPATIENT)
Facility: HOSPITAL | Age: 28
LOS: 8 days | Discharge: HOME | End: 2023-12-12
Attending: PSYCHIATRY & NEUROLOGY | Admitting: PSYCHIATRY & NEUROLOGY
Payer: MEDICAID

## 2023-12-03 VITALS
RESPIRATION RATE: 18 BRPM | HEART RATE: 93 BPM | DIASTOLIC BLOOD PRESSURE: 73 MMHG | TEMPERATURE: 98 F | OXYGEN SATURATION: 99 % | SYSTOLIC BLOOD PRESSURE: 119 MMHG

## 2023-12-03 DIAGNOSIS — F25.0 SCHIZOAFFECTIVE DISORDER, BIPOLAR TYPE: ICD-10-CM

## 2023-12-03 LAB
ALBUMIN SERPL ELPH-MCNC: 4.4 G/DL — SIGNIFICANT CHANGE UP (ref 3.3–5)
ALBUMIN SERPL ELPH-MCNC: 4.4 G/DL — SIGNIFICANT CHANGE UP (ref 3.3–5)
ALP SERPL-CCNC: 58 U/L — SIGNIFICANT CHANGE UP (ref 40–120)
ALP SERPL-CCNC: 58 U/L — SIGNIFICANT CHANGE UP (ref 40–120)
ALT FLD-CCNC: 71 U/L — HIGH (ref 4–41)
ALT FLD-CCNC: 71 U/L — HIGH (ref 4–41)
AMPHET UR-MCNC: NEGATIVE — SIGNIFICANT CHANGE UP
AMPHET UR-MCNC: NEGATIVE — SIGNIFICANT CHANGE UP
ANION GAP SERPL CALC-SCNC: 12 MMOL/L — SIGNIFICANT CHANGE UP (ref 7–14)
ANION GAP SERPL CALC-SCNC: 12 MMOL/L — SIGNIFICANT CHANGE UP (ref 7–14)
APAP SERPL-MCNC: <10 UG/ML — LOW (ref 15–25)
APAP SERPL-MCNC: <10 UG/ML — LOW (ref 15–25)
APPEARANCE UR: CLEAR — SIGNIFICANT CHANGE UP
APPEARANCE UR: CLEAR — SIGNIFICANT CHANGE UP
AST SERPL-CCNC: 34 U/L — SIGNIFICANT CHANGE UP (ref 4–40)
AST SERPL-CCNC: 34 U/L — SIGNIFICANT CHANGE UP (ref 4–40)
BARBITURATES UR SCN-MCNC: NEGATIVE — SIGNIFICANT CHANGE UP
BARBITURATES UR SCN-MCNC: NEGATIVE — SIGNIFICANT CHANGE UP
BASOPHILS # BLD AUTO: 0.03 K/UL — SIGNIFICANT CHANGE UP (ref 0–0.2)
BASOPHILS # BLD AUTO: 0.03 K/UL — SIGNIFICANT CHANGE UP (ref 0–0.2)
BASOPHILS NFR BLD AUTO: 0.4 % — SIGNIFICANT CHANGE UP (ref 0–2)
BASOPHILS NFR BLD AUTO: 0.4 % — SIGNIFICANT CHANGE UP (ref 0–2)
BENZODIAZ UR-MCNC: NEGATIVE — SIGNIFICANT CHANGE UP
BENZODIAZ UR-MCNC: NEGATIVE — SIGNIFICANT CHANGE UP
BILIRUB SERPL-MCNC: 0.3 MG/DL — SIGNIFICANT CHANGE UP (ref 0.2–1.2)
BILIRUB SERPL-MCNC: 0.3 MG/DL — SIGNIFICANT CHANGE UP (ref 0.2–1.2)
BILIRUB UR-MCNC: NEGATIVE — SIGNIFICANT CHANGE UP
BILIRUB UR-MCNC: NEGATIVE — SIGNIFICANT CHANGE UP
BUN SERPL-MCNC: 9 MG/DL — SIGNIFICANT CHANGE UP (ref 7–23)
BUN SERPL-MCNC: 9 MG/DL — SIGNIFICANT CHANGE UP (ref 7–23)
CALCIUM SERPL-MCNC: 9.3 MG/DL — SIGNIFICANT CHANGE UP (ref 8.4–10.5)
CALCIUM SERPL-MCNC: 9.3 MG/DL — SIGNIFICANT CHANGE UP (ref 8.4–10.5)
CHLORIDE SERPL-SCNC: 105 MMOL/L — SIGNIFICANT CHANGE UP (ref 98–107)
CHLORIDE SERPL-SCNC: 105 MMOL/L — SIGNIFICANT CHANGE UP (ref 98–107)
CO2 SERPL-SCNC: 22 MMOL/L — SIGNIFICANT CHANGE UP (ref 22–31)
CO2 SERPL-SCNC: 22 MMOL/L — SIGNIFICANT CHANGE UP (ref 22–31)
COCAINE METAB.OTHER UR-MCNC: NEGATIVE — SIGNIFICANT CHANGE UP
COCAINE METAB.OTHER UR-MCNC: NEGATIVE — SIGNIFICANT CHANGE UP
COLOR SPEC: YELLOW — SIGNIFICANT CHANGE UP
COLOR SPEC: YELLOW — SIGNIFICANT CHANGE UP
CREAT SERPL-MCNC: 0.93 MG/DL — SIGNIFICANT CHANGE UP (ref 0.5–1.3)
CREAT SERPL-MCNC: 0.93 MG/DL — SIGNIFICANT CHANGE UP (ref 0.5–1.3)
CREATININE URINE RESULT, DAU: 188 MG/DL — SIGNIFICANT CHANGE UP
CREATININE URINE RESULT, DAU: 188 MG/DL — SIGNIFICANT CHANGE UP
DIFF PNL FLD: NEGATIVE — SIGNIFICANT CHANGE UP
DIFF PNL FLD: NEGATIVE — SIGNIFICANT CHANGE UP
EGFR: 116 ML/MIN/1.73M2 — SIGNIFICANT CHANGE UP
EGFR: 116 ML/MIN/1.73M2 — SIGNIFICANT CHANGE UP
EOSINOPHIL # BLD AUTO: 0.16 K/UL — SIGNIFICANT CHANGE UP (ref 0–0.5)
EOSINOPHIL # BLD AUTO: 0.16 K/UL — SIGNIFICANT CHANGE UP (ref 0–0.5)
EOSINOPHIL NFR BLD AUTO: 2.1 % — SIGNIFICANT CHANGE UP (ref 0–6)
EOSINOPHIL NFR BLD AUTO: 2.1 % — SIGNIFICANT CHANGE UP (ref 0–6)
ETHANOL SERPL-MCNC: <10 MG/DL — SIGNIFICANT CHANGE UP
ETHANOL SERPL-MCNC: <10 MG/DL — SIGNIFICANT CHANGE UP
GLUCOSE SERPL-MCNC: 93 MG/DL — SIGNIFICANT CHANGE UP (ref 70–99)
GLUCOSE SERPL-MCNC: 93 MG/DL — SIGNIFICANT CHANGE UP (ref 70–99)
GLUCOSE UR QL: NEGATIVE MG/DL — SIGNIFICANT CHANGE UP
GLUCOSE UR QL: NEGATIVE MG/DL — SIGNIFICANT CHANGE UP
HCT VFR BLD CALC: 43.1 % — SIGNIFICANT CHANGE UP (ref 39–50)
HCT VFR BLD CALC: 43.1 % — SIGNIFICANT CHANGE UP (ref 39–50)
HGB BLD-MCNC: 14.1 G/DL — SIGNIFICANT CHANGE UP (ref 13–17)
HGB BLD-MCNC: 14.1 G/DL — SIGNIFICANT CHANGE UP (ref 13–17)
IANC: 4.72 K/UL — SIGNIFICANT CHANGE UP (ref 1.8–7.4)
IANC: 4.72 K/UL — SIGNIFICANT CHANGE UP (ref 1.8–7.4)
IMM GRANULOCYTES NFR BLD AUTO: 0.5 % — SIGNIFICANT CHANGE UP (ref 0–0.9)
IMM GRANULOCYTES NFR BLD AUTO: 0.5 % — SIGNIFICANT CHANGE UP (ref 0–0.9)
KETONES UR-MCNC: NEGATIVE MG/DL — SIGNIFICANT CHANGE UP
KETONES UR-MCNC: NEGATIVE MG/DL — SIGNIFICANT CHANGE UP
LEUKOCYTE ESTERASE UR-ACNC: NEGATIVE — SIGNIFICANT CHANGE UP
LEUKOCYTE ESTERASE UR-ACNC: NEGATIVE — SIGNIFICANT CHANGE UP
LYMPHOCYTES # BLD AUTO: 1.92 K/UL — SIGNIFICANT CHANGE UP (ref 1–3.3)
LYMPHOCYTES # BLD AUTO: 1.92 K/UL — SIGNIFICANT CHANGE UP (ref 1–3.3)
LYMPHOCYTES # BLD AUTO: 25 % — SIGNIFICANT CHANGE UP (ref 13–44)
LYMPHOCYTES # BLD AUTO: 25 % — SIGNIFICANT CHANGE UP (ref 13–44)
MCHC RBC-ENTMCNC: 27.6 PG — SIGNIFICANT CHANGE UP (ref 27–34)
MCHC RBC-ENTMCNC: 27.6 PG — SIGNIFICANT CHANGE UP (ref 27–34)
MCHC RBC-ENTMCNC: 32.7 GM/DL — SIGNIFICANT CHANGE UP (ref 32–36)
MCHC RBC-ENTMCNC: 32.7 GM/DL — SIGNIFICANT CHANGE UP (ref 32–36)
MCV RBC AUTO: 84.3 FL — SIGNIFICANT CHANGE UP (ref 80–100)
MCV RBC AUTO: 84.3 FL — SIGNIFICANT CHANGE UP (ref 80–100)
METHADONE UR-MCNC: NEGATIVE — SIGNIFICANT CHANGE UP
METHADONE UR-MCNC: NEGATIVE — SIGNIFICANT CHANGE UP
MONOCYTES # BLD AUTO: 0.82 K/UL — SIGNIFICANT CHANGE UP (ref 0–0.9)
MONOCYTES # BLD AUTO: 0.82 K/UL — SIGNIFICANT CHANGE UP (ref 0–0.9)
MONOCYTES NFR BLD AUTO: 10.7 % — SIGNIFICANT CHANGE UP (ref 2–14)
MONOCYTES NFR BLD AUTO: 10.7 % — SIGNIFICANT CHANGE UP (ref 2–14)
NEUTROPHILS # BLD AUTO: 4.72 K/UL — SIGNIFICANT CHANGE UP (ref 1.8–7.4)
NEUTROPHILS # BLD AUTO: 4.72 K/UL — SIGNIFICANT CHANGE UP (ref 1.8–7.4)
NEUTROPHILS NFR BLD AUTO: 61.3 % — SIGNIFICANT CHANGE UP (ref 43–77)
NEUTROPHILS NFR BLD AUTO: 61.3 % — SIGNIFICANT CHANGE UP (ref 43–77)
NITRITE UR-MCNC: NEGATIVE — SIGNIFICANT CHANGE UP
NITRITE UR-MCNC: NEGATIVE — SIGNIFICANT CHANGE UP
NRBC # BLD: 0 /100 WBCS — SIGNIFICANT CHANGE UP (ref 0–0)
NRBC # BLD: 0 /100 WBCS — SIGNIFICANT CHANGE UP (ref 0–0)
NRBC # FLD: 0 K/UL — SIGNIFICANT CHANGE UP (ref 0–0)
NRBC # FLD: 0 K/UL — SIGNIFICANT CHANGE UP (ref 0–0)
OPIATES UR-MCNC: NEGATIVE — SIGNIFICANT CHANGE UP
OPIATES UR-MCNC: NEGATIVE — SIGNIFICANT CHANGE UP
OXYCODONE UR-MCNC: NEGATIVE — SIGNIFICANT CHANGE UP
OXYCODONE UR-MCNC: NEGATIVE — SIGNIFICANT CHANGE UP
PCP SPEC-MCNC: SIGNIFICANT CHANGE UP
PCP SPEC-MCNC: SIGNIFICANT CHANGE UP
PCP UR-MCNC: NEGATIVE — SIGNIFICANT CHANGE UP
PCP UR-MCNC: NEGATIVE — SIGNIFICANT CHANGE UP
PH UR: 7 — SIGNIFICANT CHANGE UP (ref 5–8)
PH UR: 7 — SIGNIFICANT CHANGE UP (ref 5–8)
PLATELET # BLD AUTO: 270 K/UL — SIGNIFICANT CHANGE UP (ref 150–400)
PLATELET # BLD AUTO: 270 K/UL — SIGNIFICANT CHANGE UP (ref 150–400)
POTASSIUM SERPL-MCNC: 3.7 MMOL/L — SIGNIFICANT CHANGE UP (ref 3.5–5.3)
POTASSIUM SERPL-MCNC: 3.7 MMOL/L — SIGNIFICANT CHANGE UP (ref 3.5–5.3)
POTASSIUM SERPL-SCNC: 3.7 MMOL/L — SIGNIFICANT CHANGE UP (ref 3.5–5.3)
POTASSIUM SERPL-SCNC: 3.7 MMOL/L — SIGNIFICANT CHANGE UP (ref 3.5–5.3)
PROT SERPL-MCNC: 6.9 G/DL — SIGNIFICANT CHANGE UP (ref 6–8.3)
PROT SERPL-MCNC: 6.9 G/DL — SIGNIFICANT CHANGE UP (ref 6–8.3)
PROT UR-MCNC: NEGATIVE MG/DL — SIGNIFICANT CHANGE UP
PROT UR-MCNC: NEGATIVE MG/DL — SIGNIFICANT CHANGE UP
RBC # BLD: 5.11 M/UL — SIGNIFICANT CHANGE UP (ref 4.2–5.8)
RBC # BLD: 5.11 M/UL — SIGNIFICANT CHANGE UP (ref 4.2–5.8)
RBC # FLD: 13.7 % — SIGNIFICANT CHANGE UP (ref 10.3–14.5)
RBC # FLD: 13.7 % — SIGNIFICANT CHANGE UP (ref 10.3–14.5)
SALICYLATES SERPL-MCNC: <0.3 MG/DL — LOW (ref 15–30)
SALICYLATES SERPL-MCNC: <0.3 MG/DL — LOW (ref 15–30)
SARS-COV-2 RNA SPEC QL NAA+PROBE: SIGNIFICANT CHANGE UP
SARS-COV-2 RNA SPEC QL NAA+PROBE: SIGNIFICANT CHANGE UP
SODIUM SERPL-SCNC: 139 MMOL/L — SIGNIFICANT CHANGE UP (ref 135–145)
SODIUM SERPL-SCNC: 139 MMOL/L — SIGNIFICANT CHANGE UP (ref 135–145)
SP GR SPEC: 1.02 — SIGNIFICANT CHANGE UP (ref 1–1.03)
SP GR SPEC: 1.02 — SIGNIFICANT CHANGE UP (ref 1–1.03)
THC UR QL: POSITIVE
THC UR QL: POSITIVE
TOXICOLOGY SCREEN, DRUGS OF ABUSE, SERUM RESULT: SIGNIFICANT CHANGE UP
TOXICOLOGY SCREEN, DRUGS OF ABUSE, SERUM RESULT: SIGNIFICANT CHANGE UP
TSH SERPL-MCNC: 1.34 UIU/ML — SIGNIFICANT CHANGE UP (ref 0.27–4.2)
TSH SERPL-MCNC: 1.34 UIU/ML — SIGNIFICANT CHANGE UP (ref 0.27–4.2)
UROBILINOGEN FLD QL: 1 MG/DL — SIGNIFICANT CHANGE UP (ref 0.2–1)
UROBILINOGEN FLD QL: 1 MG/DL — SIGNIFICANT CHANGE UP (ref 0.2–1)
WBC # BLD: 7.69 K/UL — SIGNIFICANT CHANGE UP (ref 3.8–10.5)
WBC # BLD: 7.69 K/UL — SIGNIFICANT CHANGE UP (ref 3.8–10.5)
WBC # FLD AUTO: 7.69 K/UL — SIGNIFICANT CHANGE UP (ref 3.8–10.5)
WBC # FLD AUTO: 7.69 K/UL — SIGNIFICANT CHANGE UP (ref 3.8–10.5)

## 2023-12-03 PROCEDURE — 99285 EMERGENCY DEPT VISIT HI MDM: CPT | Mod: GC

## 2023-12-03 PROCEDURE — 99285 EMERGENCY DEPT VISIT HI MDM: CPT

## 2023-12-03 RX ORDER — DIPHENHYDRAMINE HCL 50 MG
50 CAPSULE ORAL ONCE
Refills: 0 | Status: DISCONTINUED | OUTPATIENT
Start: 2023-12-04 | End: 2023-12-12

## 2023-12-03 RX ORDER — PALIPERIDONE 1.5 MG/1
9 TABLET, EXTENDED RELEASE ORAL AT BEDTIME
Refills: 0 | Status: DISCONTINUED | OUTPATIENT
Start: 2023-12-04 | End: 2023-12-07

## 2023-12-03 RX ORDER — DIPHENHYDRAMINE HCL 50 MG
50 CAPSULE ORAL ONCE
Refills: 0 | Status: COMPLETED | OUTPATIENT
Start: 2023-12-03 | End: 2023-12-03

## 2023-12-03 RX ORDER — HALOPERIDOL DECANOATE 100 MG/ML
5 INJECTION INTRAMUSCULAR ONCE
Refills: 0 | Status: COMPLETED | OUTPATIENT
Start: 2023-12-03 | End: 2023-12-03

## 2023-12-03 RX ORDER — HALOPERIDOL DECANOATE 100 MG/ML
5 INJECTION INTRAMUSCULAR ONCE
Refills: 0 | Status: DISCONTINUED | OUTPATIENT
Start: 2023-12-04 | End: 2023-12-07

## 2023-12-03 RX ORDER — HALOPERIDOL DECANOATE 100 MG/ML
5 INJECTION INTRAMUSCULAR EVERY 4 HOURS
Refills: 0 | Status: DISCONTINUED | OUTPATIENT
Start: 2023-12-04 | End: 2023-12-12

## 2023-12-03 RX ORDER — DIPHENHYDRAMINE HCL 50 MG
50 CAPSULE ORAL EVERY 4 HOURS
Refills: 0 | Status: DISCONTINUED | OUTPATIENT
Start: 2023-12-04 | End: 2023-12-12

## 2023-12-03 RX ADMIN — HALOPERIDOL DECANOATE 5 MILLIGRAM(S): 100 INJECTION INTRAMUSCULAR at 18:40

## 2023-12-03 RX ADMIN — Medication 2 MILLIGRAM(S): at 11:25

## 2023-12-03 RX ADMIN — HALOPERIDOL DECANOATE 5 MILLIGRAM(S): 100 INJECTION INTRAMUSCULAR at 11:24

## 2023-12-03 RX ADMIN — Medication 2 MILLIGRAM(S): at 18:40

## 2023-12-03 RX ADMIN — Medication 50 MILLIGRAM(S): at 18:39

## 2023-12-03 NOTE — ED ADULT NURSE REASSESSMENT NOTE - NS ED NURSE REASSESS COMMENT FT1
0900 Received report from night RN pt irritable asking to be d/c pt denies si/hi/avh presently, emotional support provided eval on going.  10am Pt irritable ANM made aware no provider assign to pt, Dr Albarado called meds ordered as per MD rx, pt deescalated by provider labs/covid/ekg collected eval on going. 0900 Received report from night RN pt irritable asking to be d/c pt denies si/hi/avh presently, emotional support provided eval on going.  10am Pt irritable ANM made aware no provider assign to pt, Dr Albarado called meds ordered as per MD verbal  rx, pt deescalated by provider labs/covid/ekg collected meds on hold safety & comfort measures maintained eval on going.

## 2023-12-03 NOTE — ED BEHAVIORAL HEALTH ASSESSMENT NOTE - NS ED BHA PLAN ADMIT TO PSYCHIATRY ADMIT TO
Other [facility]
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
## 2023-12-03 NOTE — ED BEHAVIORAL HEALTH ASSESSMENT NOTE - DETAILS
after hours No documented hx of SAs/previous SI. Patient not cooperative with interview today. d/c from [place] on 11/29 d/c from [place] on 11/29 d/c from [place] on 11/29 discussed with father pending bed availability discussed with father; Pt's mother updated re: Pt boarding here at the ED ([phone]/ [phone]) discussed with father; Pt's mother updated re: Pt boarding here at the ED ([phone]/ [phone]) discussed with father; Pt's mother updated re: Pt boarding here at the ED ([phone]/ [phone]) discussed with father; Pt's mother updated re: Pt boarding here at the ED ([phone]/ [phone]) addendum 12/3/2023 father made aware patient will be admitted to [address] [place] [address]. patient admitted to 2 [facility] contacted. discussed with father; Pt's mother updated re: Pt boarding here at the ED ([phone]/ [phone]) addendum 12/3/2023 father made aware patient will be admitted to [address] [place] [address]. patient admitted to 2 [facility] contacted. discussed with father; Pt's mother updated re: Pt boarding here at the ED ([phone]/ [phone]) addendum 12/3/2023 father made aware patient will be admitted to [address] [place] [address]. patient admitted to 2 [facility] contacted.

## 2023-12-03 NOTE — ED ADULT NURSE REASSESSMENT NOTE - NS ED NURSE REASSESS COMMENT FT1
Pt irritable not following directions pt banging on window pt unable to be deescalated pt medicated as per MD rx, eval on going.
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
## 2023-12-03 NOTE — ED BEHAVIORAL HEALTH NOTE - BEHAVIORAL HEALTH NOTE
Patient admitted to [address] [place] [address]. Father Jerry Latif  made aware. Patient admitted to [address] [place] [address]. Father Jerry Latif  made aware.
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
## 2023-12-03 NOTE — ED BEHAVIORAL HEALTH ASSESSMENT NOTE - NS ED BHA BG INFO INPATIENT PROVIDER CONTACTED YN
Bladder calculus    BPH with obstruction/lower urinary tract symptoms    Cerebrovascular accident (CVA), unspecified mechanism  2 years ago  Diabetes mellitus  Type 2  High cholesterol    High triglycerides    HTN (hypertension)    Ureteral calculus, left No

## 2023-12-03 NOTE — ED ADULT NURSE REASSESSMENT NOTE - NS ED NURSE REASSESS COMMENT FT1
Pt awake and alert, ambulatory, used the bathroom, author attempted to obtain VS, pt refusing stating, "I already did that, I'm not doing it again." Airway is patent, speaking in clear and coherent sentences. Respirations are even and unlabored, no signs of respiratory distress.

## 2023-12-03 NOTE — ED BEHAVIORAL HEALTH ASSESSMENT NOTE - OTHER PAST PSYCHIATRIC HISTORY (INCLUDE DETAILS REGARDING ONSET, COURSE OF ILLNESS, INPATIENT/OUTPATIENT TREATMENT)
Multiple past psychiatric hospitalizations, discharged from [facility] on 11/29/23  Chronic medication non-compliance Multiple past psychiatric hospitalizations, discharged from [facility] on 11/29/23  Chronic medication non-compliance Multiple past psychiatric hospitalizations, discharged from [facility] on 11/29/23  Chronic medication non-compliance

## 2023-12-03 NOTE — ED BEHAVIORAL HEALTH ASSESSMENT NOTE - VIOLENCE RISK FACTORS:
Antisocial behavior/cognition (past or present)/Substance abuse/Affective dysregulation/Noncompliance with treatment/Irritability

## 2023-12-03 NOTE — ED PROVIDER NOTE - PROGRESS NOTE DETAILS
Karl Parr  Just received a call from charge nurse/ nurse. Patient back in behavioral health unit but has not been seen for 5.5 hours. No previous provider ever assigned overnight. Never received signout on this patient. Will see patient and evaluate Keshav, Attending  Both me and SW trying to contact father for collateral; not picking up. Patient becoming increasingly agitated/aggressive, banging on wells. Psych consulted and patient given 5 haldol/2 ativan IM for patient and staff safety

## 2023-12-03 NOTE — ED PROVIDER NOTE - COVID-19 RESULT DATE/TIME
Assessment and Plan  Problem List Items Addressed This Visit        Nervous    Vocal cord paralysis, bilateral partial     Sees Dr Rusty Castanon in September--will check the vocal cords and see if the trach size can be changed             Respiratory    Obstructive sleep apnea--needs a sleep study and follow up      Saw Dr Zuleika Bryant in the past for sleep study evaluation and cough ---will order the sleep eval and then patient needs to discuss with Dr Bryant          Relevant Orders    SERVICE TO SLEEP MEDICINE       Circulatory    HTN (hypertension)--doing fine --no chagne in medication      Patient is on amlodipine         Relevant Medications    amLODIPine (NORVASC) 5 MG tablet    Other Relevant Orders    URINALYSIS WITH MICRO & CULTURE IF INDICATED       Digestive    Vitamin D deficiency--check level    Relevant Orders    VITAMIN D -25 HYDROXY    GERD (gastroesophageal reflux disease)     Is ok on the protonix            Musculoskeletal    Age-related osteoporosis without current pathological fracture--declines bone density       Muscle pain--     Both upper arms with aching pain --try CBD oil and possibly see a rheumatologist for evaluation --Dr Jennifer Valenzuela at [facility]--[phone]  ? If could be related to the shoulders and rotator cuff             Endocrine    Hyperlipidemia--check in 1-2 years     Relevant Medications    amLODIPine (NORVASC) 5 MG tablet       Oncologic    Meningioma (CMS/HCC)     Last MRI was done in September 2018 and it shows a small meningioma on her side that has not changed since 2014--could repeat again in 2020 to look for any changes            Other    Encounter for screening for malignant neoplasm of colon     2017--dr augustin --repeat in 10 years--did an egd then and after due to gastrostomy tube          Medicare annual wellness visit, subsequent - Primary     Check labs   Declines bone density and no longer needs a pap smear   Mammogram in feb  2020  Colonoscopy up to date --2017         Tracheostomy status (CMS/[facility])     Sees Dr Rusty Castanon and may be changing the trach size         Visit for screening mammogram     2/2019--repeat in 2/2020         Relevant Orders    MAMMO SCREENING BILATERAL         18-Nov-2023 13:12
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
## 2023-12-03 NOTE — ED PROVIDER NOTE - PHYSICAL EXAMINATION
Gen: WDWN, NAD, comfortable appearing but intermittently agitated, hemodynamically stable   HEENT: Atraumatic head, EOMI, no nasal discharge, mucous membranes moist  CV: RRR, +S1/S2, no M/R/G, equal b/l radial pulses 2+  Resp: CTAB, no W/R/R, SPO2 >95% on RA, no increased WOB   GI: Abdomen soft non-distended, NTTP, no masses/organomegaly   MSK/Skin: No open wounds, no bruising, no LE edema, abrasion to left hand   Neuro: A&Ox4, moving all 4 extremities spontaneously, walking around with stable gait   Psych: Agitated/intermittently aggressive

## 2023-12-03 NOTE — ED BEHAVIORAL HEALTH ASSESSMENT NOTE - DESCRIPTION
none see HPI Patient became agitated in ED (intrusive, banging on glass) and was not responding to verbal redirection. Required Haldol 5mg / Ativan 2mg IM at approx. 11AM    ICU Vital Signs Last 24 Hrs  T(C): 36.7 (03 Dec 2023 04:39), Max: 36.7 (03 Dec 2023 04:39)  T(F): 98 (03 Dec 2023 04:39), Max: 98 (03 Dec 2023 04:39)  HR: 93 (03 Dec 2023 04:39) (93 - 93)  BP: 119/73 (03 Dec 2023 04:39) (119/73 - 119/73)  RR: 18 (03 Dec 2023 04:39) (18 - 18)  SpO2: 99% (03 Dec 2023 04:39) (99% - 99%)

## 2023-12-03 NOTE — ED BEHAVIORAL HEALTH NOTE - BEHAVIORAL HEALTH NOTE
SW was requested by provider to obtain collateral from pt father, Jerry Latif . SW called pt father 3 times and there was no response; SW left a message requesting a call back.     SW will follow up; continue to call pt father.    SW informed provider of this information.
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
## 2023-12-03 NOTE — ED ADULT NURSE NOTE - NSFALLUNIVINTERV_ED_ALL_ED
Bed/Stretcher in lowest position, wheels locked, appropriate side rails in place/Call bell, personal items and telephone in reach/Instruct patient to call for assistance before getting out of bed/chair/stretcher/Non-slip footwear applied when patient is off stretcher/[place] to call system/Physically safe environment - no spills, clutter or unnecessary equipment/Purposeful proactive rounding/Room/bathroom lighting operational, light cord in reach Bed/Stretcher in lowest position, wheels locked, appropriate side rails in place/Call bell, personal items and telephone in reach/Instruct patient to call for assistance before getting out of bed/chair/stretcher/Non-slip footwear applied when patient is off stretcher/[place] to call system/Physically safe environment - no spills, clutter or unnecessary equipment/Purposeful proactive rounding/Room/bathroom lighting operational, light cord in reach Bed/Stretcher in lowest position, wheels locked, appropriate side rails in place/Call bell, personal items and telephone in reach/Instruct patient to call for assistance before getting out of bed/chair/stretcher/Non-slip footwear applied when patient is off stretcher/[place] to call system/Physically safe environment - no spills, clutter or unnecessary equipment/Purposeful proactive rounding/Room/bathroom lighting operational, light cord in reach

## 2023-12-03 NOTE — ED ADULT TRIAGE NOTE - NS ED NURSE AMBULANCES
[facility]

## 2023-12-04 PROCEDURE — 99222 1ST HOSP IP/OBS MODERATE 55: CPT

## 2023-12-04 RX ADMIN — PALIPERIDONE 9 MILLIGRAM(S): 1.5 TABLET, EXTENDED RELEASE ORAL at 20:44

## 2023-12-04 RX ADMIN — HALOPERIDOL DECANOATE 5 MILLIGRAM(S): 100 INJECTION INTRAMUSCULAR at 23:39

## 2023-12-04 RX ADMIN — Medication 2 MILLIGRAM(S): at 23:39

## 2023-12-04 RX ADMIN — Medication 50 MILLIGRAM(S): at 23:39

## 2023-12-04 NOTE — BH INPATIENT PSYCHIATRY ASSESSMENT NOTE - NSBHCHARTREVIEWVS_PSY_A_CORE FT
Vital Signs Last 24 Hrs  T(C): 36.6 (12-04-23 @ 08:20), Max: 36.8 (12-04-23 @ 02:30)  T(F): 97.8 (12-04-23 @ 08:20), Max: 98.2 (12-04-23 @ 02:30)  HR: 92 (12-04-23 @ 02:30) (84 - 100)  BP: 94/56 (12-04-23 @ 02:30) (94/56 - 120/77)  BP(mean): --  RR: 16 (12-04-23 @ 02:30) (16 - 17)  SpO2: 96% (12-04-23 @ 02:30) (96% - 100%)    Orthostatic VS  12-04-23 @ 08:20  Lying BP: --/-- HR: --  Sitting BP: --/-- HR: --  Standing BP: 122/82 HR: 68  Site: --  Mode: --  Orthostatic VS  12-04-23 @ 02:45  Lying BP: --/-- HR: --  Sitting BP: 122/75 HR: 75  Standing BP: 126/76 HR: 89  Site: --  Mode: --

## 2023-12-04 NOTE — BH INPATIENT PSYCHIATRY ASSESSMENT NOTE - VIOLENCE RISK FACTORS:
Antisocial behavior/cognition (past or present)/Substance abuse/Affective dysregulation/Impulsivity/Community stressors that increase the risk of destabilization/Irritability

## 2023-12-04 NOTE — BH INPATIENT PSYCHIATRY ASSESSMENT NOTE - NSBHMETABOLIC_PSY_ALL_CORE_FT
BMI:   HbA1c:   Glucose:   BP: 94/56 (12-04-23 @ 02:30) (94/56 - 120/77)Vital Signs Last 24 Hrs  T(C): 36.6 (12-04-23 @ 08:20), Max: 36.8 (12-04-23 @ 02:30)  T(F): 97.8 (12-04-23 @ 08:20), Max: 98.2 (12-04-23 @ 02:30)  HR: 92 (12-04-23 @ 02:30) (84 - 100)  BP: 94/56 (12-04-23 @ 02:30) (94/56 - 120/77)  BP(mean): --  RR: 16 (12-04-23 @ 02:30) (16 - 17)  SpO2: 96% (12-04-23 @ 02:30) (96% - 100%)    Orthostatic VS  12-04-23 @ 08:20  Lying BP: --/-- HR: --  Sitting BP: --/-- HR: --  Standing BP: 122/82 HR: 68  Site: --  Mode: --  Orthostatic VS  12-04-23 @ 02:45  Lying BP: --/-- HR: --  Sitting BP: 122/75 HR: 75  Standing BP: 126/76 HR: 89  Site: --  Mode: --    Lipid Panel:
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
## 2023-12-04 NOTE — BH PATIENT PROFILE - HOME MEDICATIONS
Peridex 0.12% mucous membrane liquid , 15 milliliter(s) orally 2 times a day   amoxicillin-clavulanate 875 mg-125 mg oral tablet , 1 tab(s) orally 2 times a day   Naprosyn 500 mg oral tablet , 1 tab(s) orally every 12 hours, As Needed -for severe pain

## 2023-12-04 NOTE — BH PATIENT PROFILE - FALL HARM RISK - UNIVERSAL INTERVENTIONS
Bed in lowest position, wheels locked, appropriate side rails in place/Call bell, personal items and telephone in reach/Instruct patient to call for assistance before getting out of bed or chair/Non-slip footwear when patient is out of bed/[place] to call system/Physically safe environment - no spills, clutter or unnecessary equipment/Purposeful Proactive Rounding/Room/bathroom lighting operational, light cord in reach Bed in lowest position, wheels locked, appropriate side rails in place/Call bell, personal items and telephone in reach/Instruct patient to call for assistance before getting out of bed or chair/Non-slip footwear when patient is out of bed/[place] to call system/Physically safe environment - no spills, clutter or unnecessary equipment/Purposeful Proactive Rounding/Room/bathroom lighting operational, light cord in reach
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
## 2023-12-04 NOTE — BH INPATIENT PSYCHIATRY ASSESSMENT NOTE - OTHER PAST PSYCHIATRIC HISTORY (INCLUDE DETAILS REGARDING ONSET, COURSE OF ILLNESS, INPATIENT/OUTPATIENT TREATMENT)
Multiple past psychiatric hospitalizations, discharged from [facility] on 11/29/23  Chronic medication non-compliance Multiple past psychiatric hospitalizations, discharged from [facility] on 11/29/23  Chronic medication non-compliance

## 2023-12-04 NOTE — BH INPATIENT PSYCHIATRY ASSESSMENT NOTE - MSE UNSTRUCTURED FT
Appearance: Dressed appropriately. fair hygiene + grooming   Attitude / Behavior / relatedness: irritable. guarded.   Eye contact: appropriate   Motor: No abnormal movements, no psychomotor slowing or activation.  Speech: Regular rate. at times loud.   Mood: "fine"   Affect: irritable, ?labile   Thought Process: overall linear and organized. at times illogical   Thought Content: denies SI and HI.  +paranoia   Perceptual: denies AVH   Insight: limited   Judgment: limited   Impulse control:  limited   Cognition: grossly intact  Gait: Intact.

## 2023-12-04 NOTE — BH INPATIENT PSYCHIATRY ASSESSMENT NOTE - HPI (INCLUDE ILLNESS QUALITY, SEVERITY, DURATION, TIMING, CONTEXT, MODIFYING FACTORS, ASSOCIATED SIGNS AND SYMPTOMS)
Patient is a 29yo male, single, lives with father, recently unemployed, non-caregiver, with PPHx of Schizoaffective disorder bipolar type, and cannabis abuse, multiple past psych hospitalizations recently discharged from [facility] on 11/29/23, chronic history of medication noncompliance, was on Invega Sustenna until June 2023, no history of self-injurious behavior or suicide attempts, documented history of property destruction & aggression/violence, history of probation for reckless driving approximately 5 years ago and has history of arrests for stealing and reynoso larceny, h/o daily marijuana use, brought in by EMS, activated by father, due to agitation and disorganized behavior at home. Patient is a 29yo male, single, lives with father, recently unemployed, non-caregiver, with PPHx of Schizoaffective disorder bipolar type, and cannabis abuse, multiple past psych hospitalizations recently discharged from [facility] on 11/29/23, chronic history of medication noncompliance, was on Invega Sustenna until June 2023, no history of self-injurious behavior or suicide attempts, documented history of property destruction & aggression/violence, history of probation for reckless driving approximately 5 years ago and has history of arrests for stealing and reynoso larceny, h/o daily marijuana use, brought in by EMS, activated by father, due to agitation and disorganized behavior at home. Patient is a 29yo male, single, lives with father, recently unemployed, non-caregiver, with PPHx of Schizoaffective disorder bipolar type, and cannabis abuse, multiple past psych hospitalizations recently discharged from [facility] on 11/29/23, chronic history of medication noncompliance, was on Invega Sustenna until June 2023, no history of self-injurious behavior or suicide attempts, documented history of property destruction & aggression/violence, history of probation for reckless driving approximately 5 years ago and has history of arrests for stealing and reynoso larceny, h/o daily marijuana use, brought in by EMS, activated by father, due to agitation and disorganized behavior at home.    Patient guarded about his presentation to the hospital. when we addressed that his father reported patient locked father in room, patient becomes angry and states his father is a liar. Patient acknowledges recent hospitalization at [facility] states his stay was appx 10 or 11 days, he received Invega Sustenna slated to receive 2nd part of loading Invega dose on 12/6/23. pt consents for team to speak with father in attempts to obtain this information from DC summary at [facility]. Patient denies AVH, denies feeling paranoid, acknowledges cannabis use, does not find it to be problematic and states cannabis 'relaxes' him. he denies other substance use. denies sx of maria fernanda or depression. he agrees to ongoing medication adherence and wants to be discharged as soon as possible.     per ED note:   "On assessment, patient irritable, uncooperative. Patient had received Haldol 5mg / Ativan 2mg IM around 11AM after being intrusive, banging on glass, intimidating providers. Patient argumentative with writer, stating that writer should call his father to get information. Patient refused to participate in psychiatric assessment.     Called father Jerry () -- Reports that patient was discharged from [facility] on 11/29 and was supposed to be taking paliperidone 9mg QHS. Reports that patient had not taken medication for past week and has since been smoking a lot of weed. Reports that, at baseline, patient is polite and respectful. States patient has been verbally aggressive (yelling, cursing) but has not been physically violent. Reports that, this morning, patient stated he was missing an item and asked his father to help him locate it. When his father entered the room, patient locked him in and stated he would not let him leave until they found the object. Father became nervous for safety and activated EMS. Patient was supposed to have clinic appt on 12/6. He reports that he does not feel safe with patient returning home and is advocating for him to be admitted. Pt was previously on AOT, was doing well and receiving monthly Invega until June 2023." Patient is a 29yo male, single, lives with father, recently unemployed, non-caregiver, with PPHx of Schizoaffective disorder bipolar type, and cannabis abuse, multiple past psych hospitalizations recently discharged from [facility] on 11/29/23, chronic history of medication noncompliance, was on Invega Sustenna until June 2023, no history of self-injurious behavior or suicide attempts, documented history of property destruction & aggression/violence, history of probation for reckless driving approximately 5 years ago and has history of arrests for stealing and reynoso larceny, h/o daily marijuana use, brought in by EMS, activated by father, due to agitation and disorganized behavior at home.    Patient guarded about his presentation to the hospital. when we addressed that his father reported patient locked father in room, patient becomes angry and states his father is a liar. Patient acknowledges recent hospitalization at [facility] states his stay was appx 10 or 11 days, he received Invega Sustenna slated to receive 2nd part of loading Invega dose on 12/6/23. pt consents for team to speak with father in attempts to obtain this information from DC summary at [facility]. Patient denies AVH, denies feeling paranoid, acknowledges cannabis use, does not find it to be problematic and states cannabis 'relaxes' him. he denies other substance use. denies sx of maria fernanda or depression. he agrees to ongoing medication adherence and wants to be discharged as soon as possible.     per ED note:   "On assessment, patient irritable, uncooperative. Patient had received Haldol 5mg / Ativan 2mg IM around 11AM after being intrusive, banging on glass, intimidating providers. Patient argumentative with writer, stating that writer should call his father to get information. Patient refused to participate in psychiatric assessment.     Called father Jerry () -- Reports that patient was discharged from [facility] on 11/29 and was supposed to be taking paliperidone 9mg QHS. Reports that patient had not taken medication for past week and has since been smoking a lot of weed. Reports that, at baseline, patient is polite and respectful. States patient has been verbally aggressive (yelling, cursing) but has not been physically violent. Reports that, this morning, patient stated he was missing an item and asked his father to help him locate it. When his father entered the room, patient locked him in and stated he would not let him leave until they found the object. Father became nervous for safety and activated EMS. Patient was supposed to have clinic appt on 12/6. He reports that he does not feel safe with patient returning home and is advocating for him to be admitted. Pt was previously on AOT, was doing well and receiving monthly Invega until June 2023."

## 2023-12-04 NOTE — PSYCHIATRIC REHAB INITIAL EVALUATION - NSBHPRRECOMMEND_PSY_ALL_CORE
Writer met with patient in order to orient patient to unit, provide patient with a unit schedule, and introduce patient to psychiatric rehabilitation staff and department functions. Upon writers approach, patient was guarded and would not provide personal information to this writer. Due to this, the following information has been gathered from patients chart. Writer discussed current protocol in response to COVID-19; reviewed the importance of daily hygiene and hand-washing. Patient was oriented to safety planning protocol and informed that safety planning will be an integral aspect of their care during their inpatient hospitalization. Per chart, patient was BIBEMS activated by father due to agitation and disorganized behavior at home in the context of medication noncompliance and cannabis use. Patient has PPH of Schizoaffective disorder bipolar type, and cannabis abuse. Patient has multiple psychiatric hospitalization (most recently discharged from [facility] on 11/29/23). Patient has history of medication noncompliance and was previously on AOT up until June 2023. Patient has no history of SA/NSSIB. Patient has history of property destruction & aggression/violence, history of probation for reckless driving approximately 5 years ago and has history of arrests for stealing and reynoso larceny. Patient engages in daily cannabis use. Writer and patient were unable to collaborate on a psychiatric rehabilitation goal, therefore one will be chosen for him. Psych rehab staff will continue to engage patient daily in order to build therapeutic rapport.  Writer met with patient in order to orient patient to unit, provide patient with a unit schedule, and introduce patient to psychiatric rehabilitation staff and department functions. Upon writers approach, patient was guarded and would not provide personal information to this writer. Due to this, the following information has been gathered from patients chart. Writer discussed current protocol in response to COVID-19; reviewed the importance of daily hygiene and hand-washing. Patient was oriented to safety planning protocol and informed that safety planning will be an integral aspect of their care during their inpatient hospitalization. Per chart, patient was BIBEMS activated by father due to agitation and disorganized behavior at home in the context of medication noncompliance and cannabis use. Patient has PPH of Schizoaffective disorder bipolar type, and cannabis abuse. Patient has multiple psychiatric hospitalization (most recently discharged from [facility] on 11/29/23). Patient has history of medication noncompliance and was previously on AOT up until June 2023. Patient has no history of SA/NSSIB. Patient has history of property destruction & aggression/violence, history of probation for reckless driving approximately 5 years ago and has history of arrests for stealing and reynoso larceny. Patient engages in daily cannabis use. Writer and patient were unable to collaborate on a psychiatric rehabilitation goal, therefore one will be chosen for him. Psych rehab staff will continue to engage patient daily in order to build therapeutic rapport.

## 2023-12-04 NOTE — BH INPATIENT PSYCHIATRY ASSESSMENT NOTE - CURRENT MEDICATION
MEDICATIONS  (STANDING):  paliperidone ER. 9 milliGRAM(s) Oral at bedtime    MEDICATIONS  (PRN):  diphenhydrAMINE 50 milliGRAM(s) Oral every 4 hours PRN eps ppx  diphenhydrAMINE Injectable 50 milliGRAM(s) IntraMuscular once PRN eps ppx  haloperidol     Tablet 5 milliGRAM(s) Oral every 4 hours PRN agitation  haloperidol    Injectable 5 milliGRAM(s) IntraMuscular Once PRN severe agitation  LORazepam     Tablet 2 milliGRAM(s) Oral every 4 hours PRN Agitation  LORazepam   Injectable 2 milliGRAM(s) IntraMuscular Once PRN severe agitation

## 2023-12-04 NOTE — ED ADULT NURSE REASSESSMENT NOTE - NS ED NURSE REASSESS COMMENT FT1
Patient transferred to [address] with EMS. Patient belongings returned. Patient transferred to [address] with EMS. Patient belongings returned.

## 2023-12-04 NOTE — BH PATIENT PROFILE - NSPROEDAREADYLEARN_GEN_A_NUR
Called patient explained to him how a Colonoscopy is more appropriate. Patient stated he will call Dr. Brendon Rahman office to get Cologuard test ordered.
Called patient to reschedule his procedure that was scheduled for 4-20-20. Patient stated he does not wish to reschedule procedure he would like you to order a Cologuard test instead. Please advise.  Thanks
none

## 2023-12-04 NOTE — BH INPATIENT PSYCHIATRY ASSESSMENT NOTE - RISK ASSESSMENT
Risk factors for self-harm / harm to others: sex, psychiatric diagnosis, substance use hx, hx of aggression   Protective factors: inpatient and in a controlled setting with limited access to lethal means. not endorsing harm to self or others, able to contract for safety.

## 2023-12-04 NOTE — BH PATIENT PROFILE - FALL HARM RISK - ATTEMPT OOB
Principal Discharge DX:	Term birth of  female  Assessment and plan of treatment:	Follow-up with your pediatrician within 48 hours of discharge. Continue feeding child at least every 3 hours, wake baby to feed if needed. Please contact your pediatrician and return to the hospital if you notice any of the following:   - Fever  (T > 100.4)  - Reduced amount of wet diapers (< 5-6 per day) or no wet diaper in 12 hours  - Increased fussiness, irritability, or crying inconsolably  - Lethargy (excessively sleepy, difficult to arouse)  - Breathing difficulties (noisy breathing, increased work of breathing)  - Changes in the baby’s color (yellow, blue, pale, gray)  - Seizure or loss of consciousness No

## 2023-12-05 DIAGNOSIS — F12.90 CANNABIS USE, UNSPECIFIED, UNCOMPLICATED: ICD-10-CM

## 2023-12-05 PROCEDURE — 99232 SBSQ HOSP IP/OBS MODERATE 35: CPT

## 2023-12-05 RX ORDER — PALIPERIDONE 1.5 MG/1
234 TABLET, EXTENDED RELEASE ORAL ONCE
Refills: 0 | Status: COMPLETED | OUTPATIENT
Start: 2023-12-06 | End: 2023-12-06

## 2023-12-05 RX ORDER — DIVALPROEX SODIUM 500 MG/1
500 TABLET, DELAYED RELEASE ORAL
Refills: 0 | Status: DISCONTINUED | OUTPATIENT
Start: 2023-12-05 | End: 2023-12-07

## 2023-12-05 RX ORDER — LANOLIN ALCOHOL/MO/W.PET/CERES
3 CREAM (GRAM) TOPICAL AT BEDTIME
Refills: 0 | Status: DISCONTINUED | OUTPATIENT
Start: 2023-12-05 | End: 2023-12-12

## 2023-12-05 RX ADMIN — Medication 50 MILLIGRAM(S): at 22:51

## 2023-12-05 RX ADMIN — PALIPERIDONE 9 MILLIGRAM(S): 1.5 TABLET, EXTENDED RELEASE ORAL at 21:52

## 2023-12-05 RX ADMIN — Medication 3 MILLIGRAM(S): at 22:51

## 2023-12-05 RX ADMIN — DIVALPROEX SODIUM 500 MILLIGRAM(S): 500 TABLET, DELAYED RELEASE ORAL at 21:52

## 2023-12-05 NOTE — BH INPATIENT PSYCHIATRY PROGRESS NOTE - MSE UNSTRUCTURED FT
Appearance: Dressed appropriately. fair hygiene + grooming   Attitude / Behavior / relatedness: irritable. impulsive. guarded.   Eye contact: appropriate   Motor: No abnormal movements, no psychomotor slowing or activation.  Speech: Regular rate. at times loud.   Mood: "good"   Affect: irritable, labile   Thought Process: overall linear and organized. at times illogical   Thought Content: denies SI and HI.  +paranoia   Perceptual: denies AVH   Insight: limited   Judgment: limited   Impulse control:  limited   Cognition: grossly intact  Gait: Intact.  Appearance: Dressed appropriately. fair hygiene + grooming   Attitude / Behavior / relatedness: irritable. impulsive. guarded.   Eye contact: appropriate   Motor: No abnormal movements, no psychomotor slowing or activation.  Speech: Regular rate. at times loud.   Mood: "good"   Affect: irritable, labile   Thought Process: overall linear and organized. at times illogical   Thought Content: denies SI and HI.  +paranoia   Perceptual: denies AVH   Insight: limited   Judgment: limited   Impulse control:  better   Cognition: grossly intact  Gait: Intact.

## 2023-12-05 NOTE — BH INPATIENT PSYCHIATRY PROGRESS NOTE - NSBHATTESTCOMMENTATTENDFT_PSY_A_CORE
pt superficially cooperative, poor sleep, observed on the phone trying to make various financial transactions (overheard patient telling father a friend is going to drop off $40). he is superficial, likely minimizing symptoms, denies AVH, denies SI and HI. pt initially reported he received Invega Sustenna IM at [facility] but collateral from psychiatrist at [facility] indicates patient declined Invega IM so they continued PO. patient later today approached me stating he wants to take the Injection and we discuss starting it tomorrow.  pt superficially cooperative, poor sleep, observed on the phone trying to make various financial transactions (overheard patient telling father a friend is going to drop off $40). he is superficial, likely minimizing symptoms, denies AVH, denies SI and HI. pt initially reported he received Invega Sustenna IM at [facility] but collateral from psychiatrist at [facility] indicates patient declined Invega IM so they continued PO. patient later today approached me stating he wants to take the Injection and we discuss starting it tomorrow.

## 2023-12-05 NOTE — BH INPATIENT PSYCHIATRY PROGRESS NOTE - NSBHFUPINTERVALHXFT_PSY_A_CORE
Over this interval: Pt states he is "doing good". Patient continues to minimize his current situation. Noted by staff to be impulsive, irritable, and easily agitated. Per social work, pt was supposed to look up information on his phone while in the unit and instead proceeded to call a travel agency to book a vacation. Patient is taking his medications and reports no side effects. Pt denies AVH. Pt denies SI or HI. Pt received PRN Benadryl 50mg, Haldol 5mg, and Ativan 2mg PO last night for agitation. No STAT meds needed during this interval.    Collateral from father: Patient's AOT  in  and up to that point pt was doing well and being treated w Invega injectable. Pt told father that AOT switched his meds to oral tablets so that he could stop being in the program. Per the father pt has not been taking his medications since then, except for when he was hospitalized recently. He was discharged one week ago and since then has not taken his medications. Since 6 weeks ago, pt has been having mood swings and acting impulsively. 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 The pt has been spending a lot of money and buying things "that don't make sense". Prior to calling EMS father said pt got upset because he could not find his debit card and believed his father took it. He cornered his dad in his room and got verbally violent, but not physically. Father does not recall the pt experiencing depressive episodes before and is not sure if the patient has ever had AVH. He says the pt's largest issue is his substance use. Per father pt smokes cannabis since the age of 18 and currently smokes all day everyday. Father was going to try to find the name of the clinic and psychiatrist that pt has an upcoming appt with. His pharmacy is the [facility] in [place] ([address] [place] [address]). Over this interval: Pt states he is "doing good". Patient continues to minimize his current situation. Noted by staff to be impulsive, irritable, and easily agitated. Per social work, pt was supposed to look up information on his phone while in the unit and instead proceeded to call a travel agency to book a vacation. Patient is taking his medications and reports no side effects. Pt denies AVH. Pt denies SI or HI. Pt received PRN Benadryl 50mg, Haldol 5mg, and Ativan 2mg PO last night for agitation. No STAT meds needed during this interval.    Collateral from father: Patient's AOT  in  and up to that point pt was doing well and being treated w Invega injectable. Pt told father that AOT switched his meds to oral tablets so that he could stop being in the program. Per the father pt has not been taking his medications since then, except for when he was hospitalized recently. He was discharged one week ago and since then has not taken his medications. Since 6 weeks ago, pt has been having mood swings and acting impulsively. 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 The pt has been spending a lot of money and buying things "that don't make sense". Prior to calling EMS father said pt got upset because he could not find his debit card and believed his father took it. He cornered his dad in his room and got verbally violent, but not physically. Father does not recall the pt experiencing depressive episodes before and is not sure if the patient has ever had AVH. He says the pt's largest issue is his substance use. Per father pt smokes cannabis since the age of 18 and currently smokes all day everyday. Father was going to try to find the name of the clinic and psychiatrist that pt has an upcoming appt with. His pharmacy is the [facility] in [place] ([address] [place] [address]). Chart reviewed including pertinent labs, imaging, ekg. case discussed with treatment team.  Over this interval: Pt states he is "doing good". Patient continues to minimize his current situation. Noted by staff to be impulsive, irritable, and easily agitated. Per social work, pt was supposed to look up information on his phone while in the unit and instead proceeded to call a travel agency to book a vacation. Patient is taking his medications and reports no side effects. Pt denies AVH. Pt denies SI or HI. Pt received PRN Benadryl 50mg, Haldol 5mg, and Ativan 2mg PO last night for agitation. No STAT meds needed during this interval.    Collateral from father: Patient's AOT  in  and up to that point pt was doing well and being treated w Invega injectable. Pt told father that AOT switched his meds to oral tablets so that he could stop being in the program. Per the father pt has not been taking his medications since then, except for when he was hospitalized recently. He was discharged one week ago and since then has not taken his medications. Since 6 weeks ago, pt has been having mood swings and acting impulsively. 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 The pt has been spending a lot of money and buying things "that don't make sense". Prior to calling EMS father said pt got upset because he could not find his debit card and believed his father took it. He cornered his dad in his room and got verbally violent, but not physically. Father does not recall the pt experiencing depressive episodes before and is not sure if the patient has ever had AVH. He says the pt's largest issue is his substance use. Per father pt smokes cannabis since the age of 18 and currently smokes all day everyday. Father was going to try to find the name of the clinic and psychiatrist that pt has an upcoming appt with. His pharmacy is the [facility] in [place] ([address] [place] [address]). Chart reviewed including pertinent labs, imaging, ekg. case discussed with treatment team.  Over this interval: Pt states he is "doing good". Patient continues to minimize his current situation. Noted by staff to be impulsive, irritable, and easily agitated. Per social work, pt was supposed to look up information on his phone while in the unit and instead proceeded to call a travel agency to book a vacation. Patient is taking his medications and reports no side effects. Pt denies AVH. Pt denies SI or HI. Pt received PRN Benadryl 50mg, Haldol 5mg, and Ativan 2mg PO last night for agitation. No STAT meds needed during this interval.    Collateral from father: Patient's AOT  in  and up to that point pt was doing well and being treated w Invega injectable. Pt told father that AOT switched his meds to oral tablets so that he could stop being in the program. Per the father pt has not been taking his medications since then, except for when he was hospitalized recently. He was discharged one week ago and since then has not taken his medications. Since 6 weeks ago, pt has been having mood swings and acting impulsively. 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 The pt has been spending a lot of money and buying things "that don't make sense". Prior to calling EMS father said pt got upset because he could not find his debit card and believed his father took it. He cornered his dad in his room and got verbally violent, but not physically. Father does not recall the pt experiencing depressive episodes before and is not sure if the patient has ever had AVH. He says the pt's largest issue is his substance use. Per father pt smokes cannabis since the age of 18 and currently smokes all day everyday. Father was going to try to find the name of the clinic and psychiatrist that pt has an upcoming appt with. His pharmacy is the [facility] in [place] ([address] [place] [address]). Chart reviewed including pertinent labs, imaging, ekg. case discussed with treatment team.  Over this interval: Pt states he is "doing good". Patient continues to minimize his current situation. Noted by staff to be impulsive, irritable, and easily agitated. Per social work, pt was impulsively attempting to call a travel agency to book a vacation. Patient is taking his medications and reports no side effects. Pt denies AVH. Pt denies SI or HI. Pt received PRN Benadryl 50mg, Haldol 5mg, and Ativan 2mg PO last night for agitation. No STAT meds needed during this interval.    Collateral from father: Patient's AOT  in  and up to that point pt was doing well and being treated w Invega injectable. Pt told father that AOT switched his meds to oral tablets so that he could stop being in the program. Per the father pt has not been taking his medications since then, except for when he was hospitalized recently. He was discharged one week ago and since then has not taken his medications. Since 6 weeks ago, pt has been having mood swings and acting impulsively. 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 The pt has been spending a lot of money and buying things "that don't make sense". Prior to calling EMS father said pt got upset because he could not find his debit card and believed his father took it. He cornered his dad in his room and got verbally violent, but not physically. Father does not recall the pt experiencing depressive episodes before and is not sure if the patient has ever had AVH. He says the pt's largest issue is his substance use. Per father pt smokes cannabis since the age of 18 and currently smokes all day everyday. Father was going to try to find the name of the clinic and psychiatrist that pt has an upcoming appt with. His pharmacy is the [facility] in [place] ([address] [place] [address]). Chart reviewed including pertinent labs, imaging, ekg. case discussed with treatment team.  Over this interval: Pt states he is "doing good". Patient continues to minimize his current situation. Noted by staff to be impulsive, irritable, and easily agitated. Per social work, pt was impulsively attempting to call a travel agency to book a vacation. Patient is taking his medications and reports no side effects. Pt denies AVH. Pt denies SI or HI. Pt received PRN Benadryl 50mg, Haldol 5mg, and Ativan 2mg PO last night for agitation. No STAT meds needed during this interval.    Collateral from father: Patient's AOT  in  and up to that point pt was doing well and being treated w Invega injectable. Pt told father that AOT switched his meds to oral tablets so that he could stop being in the program. Per the father pt has not been taking his medications since then, except for when he was hospitalized recently. He was discharged one week ago and since then has not taken his medications. Since 6 weeks ago, pt has been having mood swings and acting impulsively. 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 The pt has been spending a lot of money and buying things "that don't make sense". Prior to calling EMS father said pt got upset because he could not find his debit card and believed his father took it. He cornered his dad in his room and got verbally violent, but not physically. Father does not recall the pt experiencing depressive episodes before and is not sure if the patient has ever had AVH. He says the pt's largest issue is his substance use. Per father pt smokes cannabis since the age of 18 and currently smokes all day everyday. Father was going to try to find the name of the clinic and psychiatrist that pt has an upcoming appt with. His pharmacy is the [facility] in [place] ([address] [place] [address]). Chart reviewed including pertinent labs, imaging, ekg. case discussed with treatment team.  Over this interval: Pt states he is "doing good". Patient continues to minimize his current situation. Noted by staff to be impulsive, irritable, and easily agitated. Per social work, pt was impulsively attempting to call a travel agency to book a vacation. Patient is taking his medications and reports no side effects. Pt denies AVH. Pt denies SI or HI. Pt received PRN Benadryl 50mg, Haldol 5mg, and Ativan 2mg PO last night for agitation. No STAT meds needed during this interval.    Collateral from father: Patient's AOT  in  and up to that point pt was doing well and being treated w Invega injectable. Pt told father that AOT switched his meds to oral tablets so that he could stop being in the program. Per the father pt has not been taking his medications since then, except for when he was hospitalized recently. He was discharged one week ago and since then has not taken his medications. Since 6 weeks ago, pt has been having mood swings and acting impulsively. 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 The pt has been spending a lot of money and buying things "that don't make sense". Prior to calling EMS father said pt got upset because he could not find his debit card and believed his father took it. He cornered his dad in his room and got verbally aggressive, but not physically. Father does not recall the pt experiencing depressive episodes before and is not sure if the patient has ever had AVH. He says the pt's largest issue is his substance use. Per father pt smokes cannabis since the age of 18 and currently smokes all day everyday. Father was going to try to find the name of the clinic and psychiatrist that pt has an upcoming appt with. His pharmacy is the [facility] in [place] ([address] [place] [address]). Chart reviewed including pertinent labs, imaging, ekg. case discussed with treatment team.  Over this interval: Pt states he is "doing good". Patient continues to minimize his current situation. Noted by staff to be impulsive, irritable, and easily agitated. Per social work, pt was impulsively attempting to call a travel agency to book a vacation. Patient is taking his medications and reports no side effects. Pt denies AVH. Pt denies SI or HI. Pt received PRN Benadryl 50mg, Haldol 5mg, and Ativan 2mg PO last night for agitation. No STAT meds needed during this interval.    Collateral from father: Patient's AOT  in  and up to that point pt was doing well and being treated w Invega injectable. Pt told father that AOT switched his meds to oral tablets so that he could stop being in the program. Per the father pt has not been taking his medications since then, except for when he was hospitalized recently. He was discharged one week ago and since then has not taken his medications. Since 6 weeks ago, pt has been having mood swings and acting impulsively. 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 The pt has been spending a lot of money and buying things "that don't make sense". Prior to calling EMS father said pt got upset because he could not find his debit card and believed his father took it. He cornered his dad in his room and got verbally aggressive, but not physically. Father does not recall the pt experiencing depressive episodes before and is not sure if the patient has ever had AVH. He says the pt's largest issue is his substance use. Per father pt smokes cannabis since the age of 18 and currently smokes all day everyday. Father was going to try to find the name of the clinic and psychiatrist that pt has an upcoming appt with. His pharmacy is the [facility] in [place] ([address] [place] [address]).

## 2023-12-05 NOTE — ED BEHAVIORAL HEALTH ASSESSMENT NOTE - DIFFERENTIAL
no
Substance induced psychosis vs Schizophrenia vs Schizoaffective d/o vs Bipolar d/o    r/o antisocial PD

## 2023-12-05 NOTE — BH INPATIENT PSYCHIATRY PROGRESS NOTE - NSBHMETABOLIC_PSY_ALL_CORE_FT
BMI:   HbA1c:   Glucose:   BP: 94/56 (12-04-23 @ 02:30) (94/56 - 120/77)Vital Signs Last 24 Hrs  T(C): 36.2 (12-05-23 @ 08:45), Max: 36.2 (12-05-23 @ 08:45)  T(F): 97.2 (12-05-23 @ 08:45), Max: 97.2 (12-05-23 @ 08:45)  HR: --  BP: --  BP(mean): --  RR: --  SpO2: --    Orthostatic VS  12-05-23 @ 08:45  Lying BP: 121/74 HR: 62  Sitting BP: 132/73 HR: 77  Standing BP: --/-- HR: --  Site: --  Mode: --  Orthostatic VS  12-04-23 @ 08:20  Lying BP: --/-- HR: --  Sitting BP: --/-- HR: --  Standing BP: 122/82 HR: 68  Site: --  Mode: --  Orthostatic VS  12-04-23 @ 02:45  Lying BP: --/-- HR: --  Sitting BP: 122/75 HR: 75  Standing BP: 126/76 HR: 89  Site: --  Mode: --    Lipid Panel:

## 2023-12-05 NOTE — BH INPATIENT PSYCHIATRY PROGRESS NOTE - NSBHATTESTBILLING_PSY_A_CORE
11595-Txyfdeilzb OBS or IP - moderate complexity OR 35-49 mins 33804-Qukrlxpopd OBS or IP - moderate complexity OR 35-49 mins

## 2023-12-05 NOTE — BH CHART NOTE - NSEVENTNOTEFT_PSY_ALL_CORE
Spoke with Dr. Georgi Castillo [phone], who treated the patient at [facility] in his most recent hospitalization. Patient also presented due to issue with father and it seems like his father is a trigger for him. Patient presented with manic symptoms and paranoia. During his hospitalization he switched from a manic episode to a depressive episode. The physician's diagnostic impression was Bipolar I disorder with psychotic features and cannabis use disorder. In the unit the patient was frequently violent and aggressive and had to receive multiple PRNs. Once he started accepting treatment, pt had no additional behavioral issues. Pt refused Invega Sustenna injections, so was treated with Invega PO 9mg. Pt responded well to treatment and was discharged with an aftercare appointment at the [facility] in [facility] on December 6th. Spoke with Dr. Georgi Castillo [phone], who treated the patient at [facility] in his most recent hospitalization. Patient also presented due to issue with father and it seems like his father is a trigger for him. Patient presented with manic symptoms and paranoia. During his hospitalization he switched from a manic episode to a depressive episode. The physician's diagnostic impression was Bipolar I disorder with psychotic features and cannabis use disorder. In the unit the patient was frequently violent and aggressive and had to receive multiple PRNs. Once he started accepting treatment, pt had no additional behavioral issues. Pt refused Invega Sustenna injections, so was treated with Invega PO 9mg. Pt responded well to treatment and was discharged with an aftercare appointment at the [facility] in [facility] on December 6th.

## 2023-12-05 NOTE — BH INPATIENT PSYCHIATRY PROGRESS NOTE - CURRENT MEDICATION
MEDICATIONS  (STANDING):  paliperidone ER. 9 milliGRAM(s) Oral at bedtime    MEDICATIONS  (PRN):  diphenhydrAMINE 50 milliGRAM(s) Oral every 4 hours PRN eps ppx  diphenhydrAMINE Injectable 50 milliGRAM(s) IntraMuscular once PRN eps ppx  haloperidol     Tablet 5 milliGRAM(s) Oral every 4 hours PRN agitation  haloperidol    Injectable 5 milliGRAM(s) IntraMuscular Once PRN severe agitation  LORazepam     Tablet 2 milliGRAM(s) Oral every 4 hours PRN Agitation  LORazepam   Injectable 2 milliGRAM(s) IntraMuscular Once PRN severe agitation   MEDICATIONS  (STANDING):  divalproex  milliGRAM(s) Oral two times a day  paliperidone ER. 9 milliGRAM(s) Oral at bedtime    MEDICATIONS  (PRN):  diphenhydrAMINE 50 milliGRAM(s) Oral every 4 hours PRN eps ppx  diphenhydrAMINE Injectable 50 milliGRAM(s) IntraMuscular once PRN eps ppx  haloperidol     Tablet 5 milliGRAM(s) Oral every 4 hours PRN agitation  haloperidol    Injectable 5 milliGRAM(s) IntraMuscular Once PRN severe agitation  LORazepam     Tablet 2 milliGRAM(s) Oral every 4 hours PRN Agitation  LORazepam   Injectable 2 milliGRAM(s) IntraMuscular Once PRN severe agitation

## 2023-12-05 NOTE — BH INPATIENT PSYCHIATRY PROGRESS NOTE - PRN MEDS
MEDICATIONS  (PRN):  diphenhydrAMINE 50 milliGRAM(s) Oral every 4 hours PRN eps ppx  diphenhydrAMINE Injectable 50 milliGRAM(s) IntraMuscular once PRN eps ppx  haloperidol     Tablet 5 milliGRAM(s) Oral every 4 hours PRN agitation  haloperidol    Injectable 5 milliGRAM(s) IntraMuscular Once PRN severe agitation  LORazepam     Tablet 2 milliGRAM(s) Oral every 4 hours PRN Agitation  LORazepam   Injectable 2 milliGRAM(s) IntraMuscular Once PRN severe agitation
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
## 2023-12-06 PROCEDURE — 99232 SBSQ HOSP IP/OBS MODERATE 35: CPT

## 2023-12-06 RX ADMIN — Medication 3 MILLIGRAM(S): at 22:14

## 2023-12-06 RX ADMIN — Medication 50 MILLIGRAM(S): at 22:15

## 2023-12-06 RX ADMIN — DIVALPROEX SODIUM 500 MILLIGRAM(S): 500 TABLET, DELAYED RELEASE ORAL at 21:04

## 2023-12-06 RX ADMIN — DIVALPROEX SODIUM 500 MILLIGRAM(S): 500 TABLET, DELAYED RELEASE ORAL at 09:31

## 2023-12-06 RX ADMIN — PALIPERIDONE 9 MILLIGRAM(S): 1.5 TABLET, EXTENDED RELEASE ORAL at 21:04

## 2023-12-06 RX ADMIN — PALIPERIDONE 234 MILLIGRAM(S): 1.5 TABLET, EXTENDED RELEASE ORAL at 09:32

## 2023-12-06 NOTE — BH INPATIENT PSYCHIATRY PROGRESS NOTE - MSE UNSTRUCTURED FT
Appearance: Dressed appropriately. fair hygiene + grooming   Attitude / Behavior / relatedness: possibly minimizing. guarded.   Eye contact: appropriate   Motor: No abnormal movements, no psychomotor slowing or activation.  Speech: Regular rate. regular volume.   Mood: "good"   Affect: irritable, labile   Thought Process: overall linear and organized. at times illogical   Thought Content: denies SI and HI. denies paranoia   Perceptual: denies AVH   Insight: limited  Judgment: better   Impulse control:  better   Cognition: grossly intact  Gait: Intact.  Appearance: Dressed appropriately. appropriate hygiene + grooming   Attitude / Behavior / relatedness: cooperative.   Eye contact: appropriate   Motor: No abnormal movements, no psychomotor slowing or activation.  Speech: Regular rate. regular volume.   Mood: "good"   Affect: less irritable, calmer.   Thought Process: overall linear and organized. at times illogical   Thought Content: denies SI and HI. denies paranoia   Perceptual: denies AVH   Insight: limited  Judgment: better   Impulse control:  better   Cognition: grossly intact  Gait: Intact.

## 2023-12-06 NOTE — BH INPATIENT PSYCHIATRY PROGRESS NOTE - CURRENT MEDICATION
MEDICATIONS  (STANDING):  divalproex  milliGRAM(s) Oral two times a day  paliperidone ER. 9 milliGRAM(s) Oral at bedtime    MEDICATIONS  (PRN):  diphenhydrAMINE 50 milliGRAM(s) Oral every 4 hours PRN eps ppx  diphenhydrAMINE Injectable 50 milliGRAM(s) IntraMuscular once PRN eps ppx  haloperidol     Tablet 5 milliGRAM(s) Oral every 4 hours PRN agitation  haloperidol    Injectable 5 milliGRAM(s) IntraMuscular Once PRN severe agitation  LORazepam     Tablet 2 milliGRAM(s) Oral every 4 hours PRN Agitation  LORazepam   Injectable 2 milliGRAM(s) IntraMuscular Once PRN severe agitation  melatonin. 3 milliGRAM(s) Oral at bedtime PRN Insomnia

## 2023-12-06 NOTE — BH INPATIENT PSYCHIATRY PROGRESS NOTE - NSBHATTESTCOMMENTATTENDFT_PSY_A_CORE
pt adherent to treatment. good behavioral control. pt calmer, pleasant today, more amenable to collaborate with treatment, agreed to invega Sustenna induction received 1st 234mg dose today will receive 156mg IM next week. pt tolerating depakote without issues. c/w plan as above

## 2023-12-06 NOTE — BH INPATIENT PSYCHIATRY PROGRESS NOTE - PRN MEDS
MEDICATIONS  (PRN):  diphenhydrAMINE 50 milliGRAM(s) Oral every 4 hours PRN eps ppx  diphenhydrAMINE Injectable 50 milliGRAM(s) IntraMuscular once PRN eps ppx  haloperidol     Tablet 5 milliGRAM(s) Oral every 4 hours PRN agitation  haloperidol    Injectable 5 milliGRAM(s) IntraMuscular Once PRN severe agitation  LORazepam     Tablet 2 milliGRAM(s) Oral every 4 hours PRN Agitation  LORazepam   Injectable 2 milliGRAM(s) IntraMuscular Once PRN severe agitation  melatonin. 3 milliGRAM(s) Oral at bedtime PRN Insomnia

## 2023-12-06 NOTE — BH INPATIENT PSYCHIATRY PROGRESS NOTE - NSBHATTESTBILLING_PSY_A_CORE
35966-Pefkyemeab OBS or IP - moderate complexity OR 35-49 mins 70295-Smemespcrw OBS or IP - moderate complexity OR 35-49 mins

## 2023-12-06 NOTE — BH INPATIENT PSYCHIATRY PROGRESS NOTE - NSBHMETABOLIC_PSY_ALL_CORE_FT
BMI:   HbA1c:   Glucose:   BP: 94/56 (12-04-23 @ 02:30) (94/56 - 120/77)Vital Signs Last 24 Hrs  T(C): 36.7 (12-06-23 @ 08:49), Max: 36.8 (12-05-23 @ 21:11)  T(F): 98 (12-06-23 @ 08:49), Max: 98.3 (12-05-23 @ 21:11)  HR: --  BP: --  BP(mean): --  RR: --  SpO2: --    Orthostatic VS  12-06-23 @ 08:49  Lying BP: --/-- HR: --  Sitting BP: 144/85 HR: 82  Standing BP: 147/80 HR: 71  Site: --  Mode: --  Orthostatic VS  12-05-23 @ 21:11  Lying BP: --/-- HR: --  Sitting BP: 135/74 HR: 79  Standing BP: 134/82 HR: 82  Site: --  Mode: --  Orthostatic VS  12-05-23 @ 08:45  Lying BP: 121/74 HR: 62  Sitting BP: 132/73 HR: 77  Standing BP: --/-- HR: --  Site: --  Mode: --    Lipid Panel:  BMI:   HbA1c:   Glucose:   BP: 94/56 (12-04-23 @ 02:30) (94/56 - 110/60)Vital Signs Last 24 Hrs  T(C): 36.7 (12-06-23 @ 08:49), Max: 36.8 (12-05-23 @ 21:11)  T(F): 98 (12-06-23 @ 08:49), Max: 98.3 (12-05-23 @ 21:11)  HR: --  BP: --  BP(mean): --  RR: --  SpO2: --    Orthostatic VS  12-06-23 @ 08:49  Lying BP: --/-- HR: --  Sitting BP: 144/85 HR: 82  Standing BP: 147/80 HR: 71  Site: --  Mode: --  Orthostatic VS  12-05-23 @ 21:11  Lying BP: --/-- HR: --  Sitting BP: 135/74 HR: 79  Standing BP: 134/82 HR: 82  Site: --  Mode: --  Orthostatic VS  12-05-23 @ 08:45  Lying BP: 121/74 HR: 62  Sitting BP: 132/73 HR: 77  Standing BP: --/-- HR: --  Site: --  Mode: --    Lipid Panel:

## 2023-12-06 NOTE — BH INPATIENT PSYCHIATRY PROGRESS NOTE - NSBHCONSBHPROVDETAILS_PSY_A_CORE  FT
Dr. Castillo saw patient in his last hospitalization at [facility]. See chart note for more details. Dr. Castillo saw patient in his last hospitalization at [facility]. See chart note for more details.

## 2023-12-06 NOTE — BH INPATIENT PSYCHIATRY PROGRESS NOTE - NSBHFUPINTERVALHXFT_PSY_A_CORE
Chart reviewed including pertinent labs, imaging, ekg. case discussed with treatment team.  Over this interval: Pt is doing well and is in good behavioral control. Pt has been adherent to medication and asked to start Invega Sustenna injections today. Additionally, he started Depakote yesterday. Today he does not endorse any side effects to medications. Pt denies AVH. Pt denies SI or HI. Pt received PRN Benadryl 50mg and melatonin 3mg. No STAT meds needed during this interval.
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
## 2023-12-07 DIAGNOSIS — F63.81 INTERMITTENT EXPLOSIVE DISORDER: ICD-10-CM

## 2023-12-07 PROCEDURE — 99232 SBSQ HOSP IP/OBS MODERATE 35: CPT

## 2023-12-07 RX ORDER — ACETAMINOPHEN 500 MG
650 TABLET ORAL EVERY 6 HOURS
Refills: 0 | Status: DISCONTINUED | OUTPATIENT
Start: 2023-12-07 | End: 2023-12-12

## 2023-12-07 RX ORDER — HALOPERIDOL DECANOATE 100 MG/ML
7.5 INJECTION INTRAMUSCULAR ONCE
Refills: 0 | Status: DISCONTINUED | OUTPATIENT
Start: 2023-12-07 | End: 2023-12-07

## 2023-12-07 RX ORDER — DIVALPROEX SODIUM 500 MG/1
750 TABLET, DELAYED RELEASE ORAL
Refills: 0 | Status: DISCONTINUED | OUTPATIENT
Start: 2023-12-07 | End: 2023-12-12

## 2023-12-07 RX ORDER — HALOPERIDOL DECANOATE 100 MG/ML
7 INJECTION INTRAMUSCULAR ONCE
Refills: 0 | Status: DISCONTINUED | OUTPATIENT
Start: 2023-12-07 | End: 2023-12-07

## 2023-12-07 RX ORDER — HALOPERIDOL DECANOATE 100 MG/ML
7.5 INJECTION INTRAMUSCULAR ONCE
Refills: 0 | Status: DISCONTINUED | OUTPATIENT
Start: 2023-12-07 | End: 2023-12-12

## 2023-12-07 RX ORDER — PALIPERIDONE 1.5 MG/1
12 TABLET, EXTENDED RELEASE ORAL AT BEDTIME
Refills: 0 | Status: DISCONTINUED | OUTPATIENT
Start: 2023-12-07 | End: 2023-12-12

## 2023-12-07 RX ORDER — PALIPERIDONE 1.5 MG/1
156 TABLET, EXTENDED RELEASE ORAL ONCE
Refills: 0 | Status: COMPLETED | OUTPATIENT
Start: 2023-12-12 | End: 2023-12-12

## 2023-12-07 RX ADMIN — DIVALPROEX SODIUM 500 MILLIGRAM(S): 500 TABLET, DELAYED RELEASE ORAL at 08:25

## 2023-12-07 RX ADMIN — PALIPERIDONE 12 MILLIGRAM(S): 1.5 TABLET, EXTENDED RELEASE ORAL at 20:21

## 2023-12-07 RX ADMIN — Medication 50 MILLIGRAM(S): at 20:43

## 2023-12-07 RX ADMIN — DIVALPROEX SODIUM 750 MILLIGRAM(S): 500 TABLET, DELAYED RELEASE ORAL at 20:21

## 2023-12-07 RX ADMIN — Medication 3 MILLIGRAM(S): at 20:43

## 2023-12-07 NOTE — BH INPATIENT PSYCHIATRY PROGRESS NOTE - NSBHCONSBHPROVDETAILS_PSY_A_CORE  FT
Dr. Castillo saw patient in his last hospitalization at [facility]. See chart note for more details. Dr. Castillo saw patient in his last hospitalization at [facility]. See chart note for more details.

## 2023-12-07 NOTE — BH TREATMENT PLAN - NSTXDISORGINTERRN_PSY_ALL_CORE
Nurse will assess patient's mood and behavior daily. Provide reality orientation and emotional support. Administer medication as perscribed.
Nurse will assess patient's mood and behavior daily. Provide reality orientation and emotional support. Administer medication as perscribed.

## 2023-12-07 NOTE — BH INPATIENT PSYCHIATRY PROGRESS NOTE - NSBHCONSBHPROVDETAILS_PSY_A_CORE  FT
Dr. Castillo saw patient in his last hospitalization at [facility]. See chart note for more details. Dr. Castillo saw patient in his last hospitalization at [facility]. See chart note for more details.

## 2023-12-07 NOTE — BH INPATIENT PSYCHIATRY PROGRESS NOTE - NSBHATTESTBILLING_PSY_A_CORE
53240-Mzxbkcdugr OBS or IP - moderate complexity OR 35-49 mins 50166-Jpzdkkgiox OBS or IP - moderate complexity OR 35-49 mins

## 2023-12-07 NOTE — BH INPATIENT PSYCHIATRY PROGRESS NOTE - MSE UNSTRUCTURED FT
Appearance: Dressed appropriately. appropriate hygiene + grooming   Attitude / Behavior / relatedness: Irritable and easily agitated.   Eye contact: appropriate   Motor: No abnormal movements, no psychomotor slowing or activation.  Speech: Pressured. Increased volume.  Mood: "fine"   Affect: Irritable, angry.   Thought Process: Goal directed. at times illogical   Thought Content: perseverating on discharge. denies SI or HI.  Perceptual: denies AVH   Insight: limited  Judgment: limited  Impulse control:  poor   Cognition: grossly intact  Gait: Intact.

## 2023-12-07 NOTE — BH INPATIENT PSYCHIATRY PROGRESS NOTE - PRN MEDS
MEDICATIONS  (PRN):  diphenhydrAMINE 50 milliGRAM(s) Oral every 4 hours PRN eps ppx  diphenhydrAMINE Injectable 50 milliGRAM(s) IntraMuscular once PRN eps ppx  haloperidol     Tablet 5 milliGRAM(s) Oral every 4 hours PRN agitation  haloperidol    Injectable 5 milliGRAM(s) IntraMuscular Once PRN severe agitation  LORazepam     Tablet 2 milliGRAM(s) Oral every 4 hours PRN Agitation  LORazepam   Injectable 2 milliGRAM(s) IntraMuscular Once PRN severe agitation  melatonin. 3 milliGRAM(s) Oral at bedtime PRN Insomnia   MEDICATIONS  (PRN):  diphenhydrAMINE 50 milliGRAM(s) Oral every 4 hours PRN eps ppx  diphenhydrAMINE Injectable 50 milliGRAM(s) IntraMuscular once PRN eps ppx  haloperidol     Tablet 5 milliGRAM(s) Oral every 4 hours PRN agitation  haloperidol    Injectable 7.5 milliGRAM(s) IntraMuscular once PRN severe agitation  LORazepam     Tablet 2 milliGRAM(s) Oral every 4 hours PRN Agitation  LORazepam   Injectable 2 milliGRAM(s) IntraMuscular Once PRN severe agitation  melatonin. 3 milliGRAM(s) Oral at bedtime PRN Insomnia

## 2023-12-07 NOTE — BH INPATIENT PSYCHIATRY PROGRESS NOTE - NSBHMETABOLIC_PSY_ALL_CORE_FT
BMI:   HbA1c:   Glucose:   BP: --Vital Signs Last 24 Hrs  T(C): 37.2 (12-07-23 @ 08:45), Max: 37.2 (12-07-23 @ 08:45)  T(F): 98.9 (12-07-23 @ 08:45), Max: 98.9 (12-07-23 @ 08:45)  HR: --  BP: --  BP(mean): --  RR: 20 (12-06-23 @ 20:36) (20 - 20)  SpO2: 99% (12-06-23 @ 20:36) (99% - 99%)    Orthostatic VS  12-07-23 @ 08:45  Lying BP: --/-- HR: --  Sitting BP: 134/85 HR: 90  Standing BP: 122/74 HR: 101  Site: --  Mode: --  Orthostatic VS  12-06-23 @ 20:36  Lying BP: --/-- HR: --  Sitting BP: 138/80 HR: 103  Standing BP: 142/84 HR: 104  Site: --  Mode: --  Orthostatic VS  12-06-23 @ 08:49  Lying BP: --/-- HR: --  Sitting BP: 144/85 HR: 82  Standing BP: 147/80 HR: 71  Site: --  Mode: --  Orthostatic VS  12-05-23 @ 21:11  Lying BP: --/-- HR: --  Sitting BP: 135/74 HR: 79  Standing BP: 134/82 HR: 82  Site: --  Mode: --    Lipid Panel:

## 2023-12-07 NOTE — BH INPATIENT PSYCHIATRY PROGRESS NOTE - CURRENT MEDICATION
MEDICATIONS  (STANDING):  divalproex  milliGRAM(s) Oral two times a day  haloperidol     Tablet 7 milliGRAM(s) Oral once  paliperidone ER. 9 milliGRAM(s) Oral at bedtime    MEDICATIONS  (PRN):  diphenhydrAMINE 50 milliGRAM(s) Oral every 4 hours PRN eps ppx  diphenhydrAMINE Injectable 50 milliGRAM(s) IntraMuscular once PRN eps ppx  haloperidol     Tablet 5 milliGRAM(s) Oral every 4 hours PRN agitation  haloperidol    Injectable 5 milliGRAM(s) IntraMuscular Once PRN severe agitation  LORazepam     Tablet 2 milliGRAM(s) Oral every 4 hours PRN Agitation  LORazepam   Injectable 2 milliGRAM(s) IntraMuscular Once PRN severe agitation  melatonin. 3 milliGRAM(s) Oral at bedtime PRN Insomnia

## 2023-12-07 NOTE — BH INPATIENT PSYCHIATRY PROGRESS NOTE - MSE UNSTRUCTURED FT
Appearance: Dressed appropriately. appropriate hygiene + grooming   Attitude / Behavior / relatedness: irritable.   Eye contact: appropriate   Motor: No abnormal movements, no psychomotor slowing or activation.  Speech: Regular rate. regular volume.   Mood: "fine"   Affect: with lability   Thought Process: at times illogical. perseverative.   Thought Content: denies SI and HI. +paranoia   Perceptual: denies AVH   Insight: limited  Judgment: limited   Impulse control:  tenuous   Cognition: grossly intact  Gait: Intact.

## 2023-12-07 NOTE — BH INPATIENT PSYCHIATRY PROGRESS NOTE - NSICDXBHTERTIARYDX_PSY_ALL_CORE
R/O Intermittent explosive disorder   F63.81  R/O Cluster B personality disorder in adult   F60.9   R/O Cluster B personality disorder in adult   F60.9

## 2023-12-07 NOTE — BH INPATIENT PSYCHIATRY PROGRESS NOTE - NSBHFUPINTERVALHXFT_PSY_A_CORE
Chart reviewed including pertinent labs, imaging, ekg. case discussed with treatment team.  Over this interval: pt slept well per sleep log. pt quick to irritability and on initial interview, he was calm, states he is willing to stay in the hospital to receive his second injection, denied SI and HI, denied AVH. later, patient approached team during rounds requesting discharge, continued to become more upset with not hearing that he will be discharged soon. he began raising his voice and demonstrating poor boundaries. ultimately patient was seen later with PES and patient vehemently requesting discharge, ultimately redirectable without medication intervention. we discuss that he will next receive his Invega Sustenna 156mg IM on Tuesday and if showing improvements, will propose discharge afterwards.  Chart reviewed including pertinent labs, imaging, ekg. case discussed with treatment team.  Over this interval: pt slept well per sleep log. pt quick to irritability and on initial interview, he was calm, states he is willing to stay in the hospital to receive his second injection, denied SI and HI, denied AVH. later, patient approached team during rounds requesting discharge, continued to become more upset with not hearing that he will be discharged soon. he began raising his voice and demonstrating poor boundaries. ultimately patient was seen later with PES and patient vehemently requesting discharge, ultimately redirectable without medication intervention. we discuss that he will next receive his Invega Sustenna 156mg IM on Tuesday and if showing improvements, will propose discharge afterwards.     Chart review indicates past hospitalizations with concerns for psychosis and possible maria fernanda in setting of cannabis and opioid use (pt reportedly used to abuse Percocet bought from the street). There were concerns that patient's presentation align with intermittent explosive disorder and antisocial personality disorder. Patient with history of counseling due to anger management issues.  Chart reviewed including pertinent labs, imaging, ekg. case discussed with treatment team.  Over this interval: pt slept well per sleep log. pt quick to irritability and on initial interview, he was calm, states he is willing to stay in the hospital to receive his second injection, denied SI and HI, denied AVH. later, patient approached team during rounds requesting discharge, continued to become more upset with not hearing that he will be discharged soon. he began raising his voice and demonstrating poor boundaries. ultimately patient was seen later with PES and patient vehemently requesting discharge, ultimately redirectable without medication intervention. we discuss that he will next receive his Invega Sustenna 156mg IM on Tuesday and if showing improvements, will propose discharge afterwards.     Chart review of past hospitalizations indicate past hospitalizations with concerns for psychosis and possible maria fernanda in setting of cannabis and opioid use (pt reportedly used to abuse Percocet bought from the street). There were concerns that patient's presentation align with intermittent explosive disorder and antisocial personality disorder. Patient with history of counseling due to anger management issues.  Chart reviewed including pertinent labs, imaging, ekg. case discussed with treatment team.  Over this interval: pt slept well per sleep log. pt quick to irritability and on initial interview, he was calm, states he is willing to stay in the hospital to receive his second injection, denied SI and HI, denied AVH. later, patient approached team during rounds requesting discharge, continued to become more upset with not hearing that he will be discharged soon. he began raising his voice and demonstrating poor boundaries. ultimately patient was seen later with PES and patient vehemently requesting discharge, ultimately redirectable without medication intervention. we discuss that he will next receive his Invega Sustenna 156mg IM on Tuesday and if showing improvements, will propose discharge afterwards.     Chart review of past hospitalizations indicate past admissions with concerns for psychosis and possible maria fernanda in setting of cannabis and opioid use (pt reportedly used to abuse Percocet bought from the street). There were concerns that patient's presentation align with intermittent explosive disorder and antisocial personality disorder. Patient with history of counseling due to anger management issues.
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
## 2023-12-07 NOTE — BH INPATIENT PSYCHIATRY PROGRESS NOTE - NSBHFUPINTERVALHXFT_PSY_A_CORE
Chart reviewed including pertinent labs, imaging, ekg. case discussed with treatment team.  Over this interval: Pt was initially seen in the morning and was minimizing, being very succinct and guarded with responses. Pt stated everything was going well and that he had no issues with sleep or thinking clearly. Later during rounds pt approached the team during another pt interview. Pt was demanding to speak to the attending, impatiently tapping on the glass window. When the attending came out, pt started rasing his voice demanding to be discharged tomorrow. His speech was pressured. Pt said his "vision is clear" and that he refuses to "continued being used to get other pts discharged". Pt stated he was being used for the purpose of encouraging other pts to take care of themselves so that they can be discharged and that he was never going to be discharged.  The attending explained that he still needs one more dose of the Invega Sustenna, which is dosed one week apart. Pt continued being hostile and started following the attending around the unit, cornering him into a corner. The situation was de-escalated and team agreed to speak to him later after rounds.  Later during team meeting, pt became agitated, hitting the glass window multiple times, even after he was told by the team that they would speak with him afterwards. EPS was called and the pt was seen with the team present including the attending, nurse, nurse manager, EPS, mental health worker, and medical student. Pt was agitated, yelling and demanding attending move closer towards him. Pt was difficult to interrupt and hostile. Conversation was moved to patient room, where pt sat down and continued to yell and have pressured speech. After back and forth conversation and continued explanation of why discharge is not possible, pt hesitantly agreed to calm down and wait until next week for his second dose of the Invega Sustenna. PO PRNs for Haldol, Ativan, and Benadryl were offered, but pt refused. No STAT medications were given.

## 2023-12-07 NOTE — BH INPATIENT PSYCHIATRY PROGRESS NOTE - NSICDXBHSECONDARYDX_PSY_ALL_CORE
Cannabis use disorder   F12.90   Cannabis use disorder   F12.90  Intermittent explosive disorder   F63.81

## 2023-12-07 NOTE — BH INPATIENT PSYCHIATRY PROGRESS NOTE - CURRENT MEDICATION
MEDICATIONS  (STANDING):  divalproex  milliGRAM(s) Oral two times a day  paliperidone ER 12 milliGRAM(s) Oral at bedtime    MEDICATIONS  (PRN):  diphenhydrAMINE 50 milliGRAM(s) Oral every 4 hours PRN eps ppx  diphenhydrAMINE Injectable 50 milliGRAM(s) IntraMuscular once PRN eps ppx  haloperidol     Tablet 5 milliGRAM(s) Oral every 4 hours PRN agitation  haloperidol    Injectable 5 milliGRAM(s) IntraMuscular Once PRN severe agitation  LORazepam     Tablet 2 milliGRAM(s) Oral every 4 hours PRN Agitation  LORazepam   Injectable 2 milliGRAM(s) IntraMuscular Once PRN severe agitation  melatonin. 3 milliGRAM(s) Oral at bedtime PRN Insomnia   MEDICATIONS  (STANDING):  divalproex  milliGRAM(s) Oral two times a day  paliperidone ER 12 milliGRAM(s) Oral at bedtime    MEDICATIONS  (PRN):  diphenhydrAMINE 50 milliGRAM(s) Oral every 4 hours PRN eps ppx  diphenhydrAMINE Injectable 50 milliGRAM(s) IntraMuscular once PRN eps ppx  haloperidol     Tablet 5 milliGRAM(s) Oral every 4 hours PRN agitation  haloperidol    Injectable 7.5 milliGRAM(s) IntraMuscular once PRN severe agitation  LORazepam     Tablet 2 milliGRAM(s) Oral every 4 hours PRN Agitation  LORazepam   Injectable 2 milliGRAM(s) IntraMuscular Once PRN severe agitation  melatonin. 3 milliGRAM(s) Oral at bedtime PRN Insomnia

## 2023-12-07 NOTE — BH TREATMENT PLAN - NSTXDISORGINTERPR_PSY_ALL_CORE
Psych rehab recommends patient engages in individual and group therapy sessions in order for patient to gain psychoeducation, psychotherapy and support over the next seven days. Psych rehab will continuously work with patient to build therapeutic rapport.
Psych rehab recommends patient engages in individual and group therapy sessions in order for patient to gain psychoeducation, psychotherapy and support over the next seven days. Psych rehab will continuously work with patient to build therapeutic rapport.

## 2023-12-08 PROCEDURE — 99232 SBSQ HOSP IP/OBS MODERATE 35: CPT

## 2023-12-08 RX ADMIN — Medication 50 MILLIGRAM(S): at 21:42

## 2023-12-08 RX ADMIN — Medication 3 MILLIGRAM(S): at 21:42

## 2023-12-08 RX ADMIN — PALIPERIDONE 12 MILLIGRAM(S): 1.5 TABLET, EXTENDED RELEASE ORAL at 21:42

## 2023-12-08 RX ADMIN — DIVALPROEX SODIUM 750 MILLIGRAM(S): 500 TABLET, DELAYED RELEASE ORAL at 09:02

## 2023-12-08 RX ADMIN — DIVALPROEX SODIUM 750 MILLIGRAM(S): 500 TABLET, DELAYED RELEASE ORAL at 21:42
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
## 2023-12-08 NOTE — BH DISCHARGE NOTE NURSING/SOCIAL WORK/PSYCH REHAB - [facility]
Unit Name: 2 North                        Unit Phone Number: [phone] Unit Name: 2 North                        Unit Phone Number: [phone]

## 2023-12-08 NOTE — BH INPATIENT PSYCHIATRY PROGRESS NOTE - PRN MEDS
MEDICATIONS  (PRN):  acetaminophen     Tablet .. 650 milliGRAM(s) Oral every 6 hours PRN Temp greater or equal to 38C (100.4F), Mild Pain (1 - 3), Moderate Pain (4 - 6)  diphenhydrAMINE 50 milliGRAM(s) Oral every 4 hours PRN eps ppx  diphenhydrAMINE Injectable 50 milliGRAM(s) IntraMuscular once PRN eps ppx  haloperidol     Tablet 5 milliGRAM(s) Oral every 4 hours PRN agitation  haloperidol    Injectable 7.5 milliGRAM(s) IntraMuscular once PRN severe agitation  LORazepam     Tablet 2 milliGRAM(s) Oral every 4 hours PRN Agitation  LORazepam   Injectable 2 milliGRAM(s) IntraMuscular Once PRN severe agitation  melatonin. 3 milliGRAM(s) Oral at bedtime PRN Insomnia

## 2023-12-08 NOTE — BH DISCHARGE NOTE NURSING/SOCIAL WORK/PSYCH REHAB - DISCHARGE INSTRUCTIONS AFTERCARE APPOINTMENTS
In order to check the location, date, or time of your aftercare appointment, please refer to your Discharge Instructions Document given to you upon leaving the hospital.  If you have lost the instructions please call [phone] In order to check the location, date, or time of your aftercare appointment, please refer to your Discharge Instructions Document given to you upon leaving the hospital.  If you have lost the instructions please call [phone]

## 2023-12-08 NOTE — BH INPATIENT PSYCHIATRY PROGRESS NOTE - NSBHATTESTCOMMENTATTENDFT_PSY_A_CORE
Patient calmer, more cooperative, pleasant on interview. He has no complaints. Denies AVH. denies SI and HI. normal appetite, stable mood. Pt will receive Invega Sustenna next week. Aftercare planning in progress.

## 2023-12-08 NOTE — BH INPATIENT PSYCHIATRY PROGRESS NOTE - MSE UNSTRUCTURED FT
Appearance: Dressed appropriately. appropriate hygiene + grooming   Attitude / Behavior / relatedness: irritable.   Eye contact: appropriate   Motor: No abnormal movements, no psychomotor slowing or activation.  Speech: Regular rate. regular volume.   Mood: "fine"   Affect: with lability   Thought Process: at times illogical. perseverative.   Thought Content: denies SI and HI. +paranoia   Perceptual: denies AVH   Insight: limited  Judgment: limited   Impulse control:  tenuous   Cognition: grossly intact  Gait: Intact.  Appearance: Dressed appropriately. appropriate hygiene + grooming   Attitude / Behavior / relatedness: superficially cooperative  Eye contact: appropriate   Motor: No abnormal movements, no psychomotor slowing or activation.  Speech: Regular rate. regular volume.   Mood: "okay"   Affect: neutral at times with lability   Thought Process: at times illogical. perseverative.   Thought Content: denies SI and HI. denies paranoia   Perceptual: denies AVH   Insight: limited  Judgment: limited   Impulse control:  tenuous   Cognition: grossly intact  Gait: Intact.

## 2023-12-08 NOTE — BH DISCHARGE NOTE NURSING/SOCIAL WORK/PSYCH REHAB - NSCDUDCCRISIS_PSY_A_CORE
[facility]  [phone] [facility]-WELL ([phone])  Text "WELL" to 12271  Website: www.RightAnswers.Exploredge/.National Suicide Prevention Lifeline 2 (967) 828-1939/.  [facility]  1 (189) LIFENET (634-7896)/.  [facility]  [address] [id number] [phone]   Hours:  Monday through Friday from 9 AM to 3 PM/988 Suicide and Crisis Lifeline [facility]  1 [phone]) [facility]-WELL ([phone])  Text "WELL" to 83559  Website: www.[employer]vis[employer]/.National Suicide Prevention Lifeline 2 (154) 713-0535/.  [facility]  1 (809) LIFENET (072-1995)/.  [facility]  [address] [address] [phone]   Hours:  Monday through Friday from 9 AM to 3 PM/988 Suicide and Crisis Lifeline

## 2023-12-08 NOTE — BH INPATIENT PSYCHIATRY PROGRESS NOTE - NSBHCONSBHPROVDETAILS_PSY_A_CORE  FT
Dr. Castillo saw patient in his last hospitalization at [facility]. See chart note for more details. Dr. Castillo saw patient in his last hospitalization at [facility]. See chart note for more details.

## 2023-12-08 NOTE — BH INPATIENT PSYCHIATRY PROGRESS NOTE - NSBHFUPINTERVALHXFT_PSY_A_CORE
Chart reviewed including pertinent labs, imaging, ekg. case discussed with treatment team.  Over this interval: Pt seen in his room. He is sleeping well and has good appettite.       pt slept well per sleep log. pt quick to irritability and on initial interview, he was calm, states he is willing to stay in the hospital to receive his second injection, denied SI and HI, denied AVH. later, patient approached team during rounds requesting discharge, continued to become more upset with not hearing that he will be discharged soon. he began raising his voice and demonstrating poor boundaries. ultimately patient was seen later with PES and patient vehemently requesting discharge, ultimately redirectable without medication intervention. we discuss that he will next receive his Invega Sustenna 156mg IM on Tuesday and if showing improvements, will propose discharge afterwards.     Chart review of past hospitalizations indicate past admissions with concerns for psychosis and possible maria fernanda in setting of cannabis and opioid use (pt reportedly used to abuse Percocet bought from the street). There were concerns that patient's presentation align with intermittent explosive disorder and antisocial personality disorder. Patient with history of counseling due to anger management issues. Chart reviewed including pertinent labs, imaging, ekg. case discussed with treatment team.  Over this interval: Pt seen in his room. Pt was calm and superficially cooperative. He is sleeping well and has good appetite. Pt has been adherent to all medications. Patient received PRNs for Benadryl 50mg PO and Melatonin 3mg PO. No STAT medications given during this interval.

## 2023-12-08 NOTE — BH INPATIENT PSYCHIATRY PROGRESS NOTE - CURRENT MEDICATION
MEDICATIONS  (STANDING):  divalproex  milliGRAM(s) Oral two times a day  paliperidone ER 12 milliGRAM(s) Oral at bedtime    MEDICATIONS  (PRN):  acetaminophen     Tablet .. 650 milliGRAM(s) Oral every 6 hours PRN Temp greater or equal to 38C (100.4F), Mild Pain (1 - 3), Moderate Pain (4 - 6)  diphenhydrAMINE 50 milliGRAM(s) Oral every 4 hours PRN eps ppx  diphenhydrAMINE Injectable 50 milliGRAM(s) IntraMuscular once PRN eps ppx  haloperidol     Tablet 5 milliGRAM(s) Oral every 4 hours PRN agitation  haloperidol    Injectable 7.5 milliGRAM(s) IntraMuscular once PRN severe agitation  LORazepam     Tablet 2 milliGRAM(s) Oral every 4 hours PRN Agitation  LORazepam   Injectable 2 milliGRAM(s) IntraMuscular Once PRN severe agitation  melatonin. 3 milliGRAM(s) Oral at bedtime PRN Insomnia

## 2023-12-08 NOTE — BH INPATIENT PSYCHIATRY PROGRESS NOTE - NSBHMETABOLIC_PSY_ALL_CORE_FT
BMI:   HbA1c:   Glucose:   BP: --Vital Signs Last 24 Hrs  T(C): 36.9 (12-08-23 @ 08:45), Max: 36.9 (12-08-23 @ 08:45)  T(F): 98.5 (12-08-23 @ 08:45), Max: 98.5 (12-08-23 @ 08:45)  HR: --  BP: --  BP(mean): --  RR: --  SpO2: --    Orthostatic VS  12-08-23 @ 08:45  Lying BP: --/-- HR: --  Sitting BP: 130/79 HR: 107  Standing BP: 140/84 HR: 118  Site: --  Mode: --  Orthostatic VS  12-07-23 @ 08:45  Lying BP: --/-- HR: --  Sitting BP: 134/85 HR: 90  Standing BP: 122/74 HR: 101  Site: --  Mode: --  Orthostatic VS  12-06-23 @ 20:36  Lying BP: --/-- HR: --  Sitting BP: 138/80 HR: 103  Standing BP: 142/84 HR: 104  Site: --  Mode: --    Lipid Panel:

## 2023-12-08 NOTE — BH INPATIENT PSYCHIATRY PROGRESS NOTE - NSBHATTESTBILLING_PSY_A_CORE
11633-Cgzicrjdci OBS or IP - moderate complexity OR 35-49 mins 90608-Cvzdizrmbt OBS or IP - moderate complexity OR 35-49 mins

## 2023-12-08 NOTE — BH DISCHARGE NOTE NURSING/SOCIAL WORK/PSYCH REHAB - NSDCPRGOAL_PSY_ALL_CORE
Patient has demonstrated progress towards psychiatric rehabilitation goals during current hospitalization. Patient has demonstrated daily medication compliance and is tolerating medications well. Patient reports improvement in symptoms compared to admission. Patient denies SI/HI/AVH. Patient was able to identify listening to music, playing video games and removing self from triggering situations as effective coping skills to manage symptoms. Patient reports feeling confident in ability to utilize coping skills post discharge. Patient has attended approximately 10 percent of psychiatric rehabilitation groups during current hospitalization. These groups mostly leisure-based and patient was able to tolerate structure with some redirection. Patient was visible in the milieu. Patient increasingly socialized with select peers appropriately. Patient was able to make needs known to staff. Patient has demonstrated improved insight into the current episode and was able to identify shifting mood, increased energy, increased cannabis use and getting into arguments with father as warning signs that a crisis may be developing. Patient expressed readiness for discharge. Patient and staff collaborated on safety planning prior to discharge.

## 2023-12-08 NOTE — BH DISCHARGE NOTE NURSING/SOCIAL WORK/PSYCH REHAB - NSDCPRRECOMMEND_PSY_ALL_CORE
Psychiatric rehabilitation staff recommends that patient continue to demonstrate daily medication compliance and utilize identified coping skills to manage symptoms. Patient would benefit from attending [facility] for ongoing medication management, support and psychotherapy.

## 2023-12-08 NOTE — BH DISCHARGE NOTE NURSING/SOCIAL WORK/PSYCH REHAB - NSDCVIVACCINE_GEN_ALL_CORE_FT
Tdap; 07-Apr-2019 12:28; Andreia Del Real (RN); [employer]; [id number] (Exp. Date: 03-Dec-2020); IntraMuscular; Deltoid Left.; 0.5 milliLiter(s); VIS (VIS Published: 09-May-2013, VIS Presented: 07-Apr-2019);   Tdap; 30-Jul-2019 14:40; Yuliana Castañeda (JUAN); [employer]; [id number] (Exp. Date: 21-Apr-2026); IntraMuscular; Deltoid Left.; 0.5 milliLiter(s); VIS (VIS Published: 09-May-2013, VIS Presented: 30-Jul-2019);    Tdap; 07-Apr-2019 12:28; Andreia Del Real (RN); [employer]; [id number] (Exp. Date: 03-Dec-2020); IntraMuscular; Deltoid Left.; 0.5 milliLiter(s); VIS (VIS Published: 09-May-2013, VIS Presented: 07-Apr-2019);   Tdap; 30-Jul-2019 14:40; Yuliana Castañeda (JUAN); [employer]; [id number] (Exp. Date: 21-Apr-2026); IntraMuscular; Deltoid Left.; 0.5 milliLiter(s); VIS (VIS Published: 09-May-2013, VIS Presented: 30-Jul-2019);

## 2023-12-08 NOTE — BH DISCHARGE NOTE NURSING/SOCIAL WORK/PSYCH REHAB - PATIENT PORTAL LINK FT
You can access the FollowMyHealth Patient Portal offered by [facility] by registering at the following website: [email or web address]. By joining [email or web address]’s FollowMyHealth portal, you will also be able to view your health information using other applications (apps) compatible with our system. You can access the FollowMyHealth Patient Portal offered by [facility] by registering at the following website: [email or web address]. By joining [facility]’s FollowMyHealth portal, you will also be able to view your health information using other applications (apps) compatible with our system.

## 2023-12-08 NOTE — BH DISCHARGE NOTE NURSING/SOCIAL WORK/PSYCH REHAB - NSBHDCADDR1FT_A_CORE
[address] [place], [place] [address] [address] [place], [place] [address]

## 2023-12-09 RX ADMIN — PALIPERIDONE 12 MILLIGRAM(S): 1.5 TABLET, EXTENDED RELEASE ORAL at 21:00

## 2023-12-09 RX ADMIN — Medication 50 MILLIGRAM(S): at 21:00

## 2023-12-09 RX ADMIN — DIVALPROEX SODIUM 750 MILLIGRAM(S): 500 TABLET, DELAYED RELEASE ORAL at 08:06

## 2023-12-09 RX ADMIN — DIVALPROEX SODIUM 750 MILLIGRAM(S): 500 TABLET, DELAYED RELEASE ORAL at 21:00

## 2023-12-10 RX ADMIN — DIVALPROEX SODIUM 750 MILLIGRAM(S): 500 TABLET, DELAYED RELEASE ORAL at 08:51

## 2023-12-10 RX ADMIN — DIVALPROEX SODIUM 750 MILLIGRAM(S): 500 TABLET, DELAYED RELEASE ORAL at 20:39

## 2023-12-10 RX ADMIN — Medication 50 MILLIGRAM(S): at 20:39

## 2023-12-10 RX ADMIN — Medication 3 MILLIGRAM(S): at 20:39

## 2023-12-10 RX ADMIN — PALIPERIDONE 12 MILLIGRAM(S): 1.5 TABLET, EXTENDED RELEASE ORAL at 20:39

## 2023-12-11 PROCEDURE — 99238 HOSP IP/OBS DSCHRG MGMT 30/<: CPT

## 2023-12-11 RX ORDER — PALIPERIDONE 1.5 MG/1
156 TABLET, EXTENDED RELEASE ORAL
Qty: 1 | Refills: 0
Start: 2023-12-11 | End: 2023-12-11

## 2023-12-11 RX ORDER — DIVALPROEX SODIUM 500 MG/1
3 TABLET, DELAYED RELEASE ORAL
Qty: 42 | Refills: 0
Start: 2023-12-11 | End: 2023-12-24

## 2023-12-11 RX ADMIN — DIVALPROEX SODIUM 750 MILLIGRAM(S): 500 TABLET, DELAYED RELEASE ORAL at 09:20

## 2023-12-11 RX ADMIN — PALIPERIDONE 12 MILLIGRAM(S): 1.5 TABLET, EXTENDED RELEASE ORAL at 20:55

## 2023-12-11 RX ADMIN — DIVALPROEX SODIUM 750 MILLIGRAM(S): 500 TABLET, DELAYED RELEASE ORAL at 20:55

## 2023-12-11 RX ADMIN — Medication 50 MILLIGRAM(S): at 20:59
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
## 2023-12-11 NOTE — BH INPATIENT PSYCHIATRY PROGRESS NOTE - NSBHASSESSSUMMFT_PSY_ALL_CORE
27 yo male with Bipolar 1 disorder with psychosis, cannabis use disorder, prior hospitalizations, prior AOT, no known suicide attempts, hx of substance use, +legal hx, +hx of violence and property destruction, no formal medical hx, presents in setting of recent discharge from [facility] with concerns for psychotic decompensation in setting of cannabis use. legal status 9.27     no overt mood or psychotic symptoms. pt due to receive Invega Sustenna 156mg IM tomorrow. plan as below     1. Safety: q15 obs    2. Psychiatric:  - Invega PO 12mg qhs   - Invega Sustenna 234mg IM on 12/6/23 followed by 156mg IM on 12/12/23   - Depakote PO 750mg BID   -PRNs: Haldol 7.5mg + Lorazepam 2mg +/- Benadryl PO or IM for non-redirectable agitation   -Individual, group, milieu therapy  -Psychoeducation provided to patient regarding indication for medications, alternatives, side effects, adherence.    3. Medical:  -PRN: Tylenol 650mg q6-8hr for moderate pain  -Routine labs    4. Disposition: Pending clinical course; coordinate with team social work    5. Legal status: 9.27 27 yo male with Bipolar 1 disorder with psychosis, cannabis use disorder, prior hospitalizations, prior AOT, no known suicide attempts, hx of substance use, +legal hx, +hx of violence and property destruction, no formal medical hx, presents in setting of recent discharge from [facility] with concerns for psychotic decompensation in setting of cannabis use. legal status 9.27     no overt mood or psychotic symptoms. pt due to receive Invega Sustenna 156mg IM tomorrow. plan as below     1. Safety: q15 obs    2. Psychiatric:  - Invega PO 12mg qhs   - Invega Sustenna 234mg IM on 12/6/23 followed by 156mg IM on 12/12/23   - Depakote PO 750mg BID   -PRNs: Haldol 7.5mg + Lorazepam 2mg +/- Benadryl PO or IM for non-redirectable agitation   -Individual, group, milieu therapy  -Psychoeducation provided to patient regarding indication for medications, alternatives, side effects, adherence.    3. Medical:  -PRN: Tylenol 650mg q6-8hr for moderate pain  -Routine labs    4. Disposition: Pending clinical course; coordinate with team social work    5. Legal status: 9.27

## 2023-12-11 NOTE — BH INPATIENT PSYCHIATRY PROGRESS NOTE - MSE UNSTRUCTURED FT
Appearance: Dressed appropriately. appropriate hygiene + grooming   Attitude / Behavior / relatedness: cooperative.   Eye contact: appropriate   Motor: No abnormal movements, no psychomotor slowing or activation.  Speech: Regular rate. regular volume.   Mood: "fine"  Affect: neutral; reactive.   Thought Process: organized. linear   Thought Content: denies SI and HI. denies paranoia   Perceptual: denies AVH   Insight: improving   Judgment: better  Impulse control:  good over this interval   Cognition: grossly intact  Gait: Intact.

## 2023-12-11 NOTE — BH INPATIENT PSYCHIATRY PROGRESS NOTE - NSBHFUPINTERVALHXFT_PSY_A_CORE
Chart reviewed including pertinent labs, imaging, ekg. case discussed with treatment team.  Over this interval: no behavioral issues. pt adherent to treatment. no AVH. stable mood and appetite. has no complaints. no a/e to medications. sleeping well. attending some groups. he understands tomorrow he will receive Invega sustenna 156mg IM

## 2023-12-11 NOTE — BH INPATIENT PSYCHIATRY DISCHARGE NOTE - NSBHASSESSSUMMFT_PSY_ALL_CORE
27 yo male with bipolar disorder, prior hospitalizations, substance use hx initially presented with manic symtoms in context of poor compliance to medications + substance use now with stabilized symptoms and ready for discharge. psychoed provided to patient. no acute safety issues at this time.  29 yo male with bipolar disorder, prior hospitalizations, substance use hx initially presented with manic symtoms in context of poor compliance to medications + substance use now with stabilized symptoms and ready for discharge. psychoed provided to patient. no acute safety issues at this time.

## 2023-12-11 NOTE — BH INPATIENT PSYCHIATRY PROGRESS NOTE - NSBHCONSBHPROVDETAILS_PSY_A_CORE  FT
Dr. Castillo saw patient in his last hospitalization at [facility]. See chart note for more details. Dr. Castillo saw patient in his last hospitalization at [facility]. See chart note for more details.

## 2023-12-11 NOTE — BH INPATIENT PSYCHIATRY PROGRESS NOTE - NSBHATTESTBILLING_PSY_A_CORE
91644-Atlgaxtxmg OBS or IP - moderate complexity OR 35-49 mins 31928-Ixwuiwstor OBS or IP - moderate complexity OR 35-49 mins

## 2023-12-11 NOTE — BH INPATIENT PSYCHIATRY DISCHARGE NOTE - NSDCRECOMMEND_PSY_ALL_CORE
Break RN note- Patient resting quietly in bed, breathing even and nonlabored. No acute distress. Patient complaining of nausea- Dr. Osullivan aware. Patient medicated as ordered. Patient butterflied for second blood cultures and further labs. Patient medicated as ordered. Safety maintained. Patient stable upon exiting the room. Unrestricted diet/activity

## 2023-12-11 NOTE — BH INPATIENT PSYCHIATRY DISCHARGE NOTE - VIOLENCE RISK FACTORS:
Violent ideation/threat/speech/Substance abuse/Affective dysregulation/Impulsivity/Community stressors that increase the risk of destabilization/Irritability

## 2023-12-11 NOTE — BH INPATIENT PSYCHIATRY PROGRESS NOTE - NSBHCHARTREVIEWVS_PSY_A_CORE FT
Vital Signs Last 24 Hrs  T(C): 36.2 (12-05-23 @ 08:45), Max: 36.2 (12-05-23 @ 08:45)  T(F): 97.2 (12-05-23 @ 08:45), Max: 97.2 (12-05-23 @ 08:45)  HR: --  BP: --  BP(mean): --  RR: --  SpO2: --    Orthostatic VS  12-05-23 @ 08:45  Lying BP: 121/74 HR: 62  Sitting BP: 132/73 HR: 77  Standing BP: --/-- HR: --  Site: --  Mode: --  Orthostatic VS  12-04-23 @ 08:20  Lying BP: --/-- HR: --  Sitting BP: --/-- HR: --  Standing BP: 122/82 HR: 68  Site: --  Mode: --  Orthostatic VS  12-04-23 @ 02:45  Lying BP: --/-- HR: --  Sitting BP: 122/75 HR: 75  Standing BP: 126/76 HR: 89  Site: --  Mode: --  
Vital Signs Last 24 Hrs  T(C): 36.9 (12-08-23 @ 08:45), Max: 36.9 (12-08-23 @ 08:45)  T(F): 98.5 (12-08-23 @ 08:45), Max: 98.5 (12-08-23 @ 08:45)  HR: --  BP: --  BP(mean): --  RR: --  SpO2: --    Orthostatic VS  12-08-23 @ 08:45  Lying BP: --/-- HR: --  Sitting BP: 130/79 HR: 107  Standing BP: 140/84 HR: 118  Site: --  Mode: --  Orthostatic VS  12-07-23 @ 08:45  Lying BP: --/-- HR: --  Sitting BP: 134/85 HR: 90  Standing BP: 122/74 HR: 101  Site: --  Mode: --  Orthostatic VS  12-06-23 @ 20:36  Lying BP: --/-- HR: --  Sitting BP: 138/80 HR: 103  Standing BP: 142/84 HR: 104  Site: --  Mode: --  
Vital Signs Last 24 Hrs  T(C): 37.2 (12-07-23 @ 08:45), Max: 37.2 (12-07-23 @ 08:45)  T(F): 98.9 (12-07-23 @ 08:45), Max: 98.9 (12-07-23 @ 08:45)  HR: --  BP: --  BP(mean): --  RR: 20 (12-06-23 @ 20:36) (20 - 20)  SpO2: 99% (12-06-23 @ 20:36) (99% - 99%)    Orthostatic VS  12-07-23 @ 08:45  Lying BP: --/-- HR: --  Sitting BP: 134/85 HR: 90  Standing BP: 122/74 HR: 101  Site: --  Mode: --  Orthostatic VS  12-06-23 @ 20:36  Lying BP: --/-- HR: --  Sitting BP: 138/80 HR: 103  Standing BP: 142/84 HR: 104  Site: --  Mode: --  Orthostatic VS  12-06-23 @ 08:49  Lying BP: --/-- HR: --  Sitting BP: 144/85 HR: 82  Standing BP: 147/80 HR: 71  Site: --  Mode: --  Orthostatic VS  12-05-23 @ 21:11  Lying BP: --/-- HR: --  Sitting BP: 135/74 HR: 79  Standing BP: 134/82 HR: 82  Site: --  Mode: --  
Vital Signs Last 24 Hrs  T(C): 36.7 (12-06-23 @ 08:49), Max: 36.8 (12-05-23 @ 21:11)  T(F): 98 (12-06-23 @ 08:49), Max: 98.3 (12-05-23 @ 21:11)  HR: --  BP: --  BP(mean): --  RR: --  SpO2: --    Orthostatic VS  12-06-23 @ 08:49  Lying BP: --/-- HR: --  Sitting BP: 144/85 HR: 82  Standing BP: 147/80 HR: 71  Site: --  Mode: --  Orthostatic VS  12-05-23 @ 21:11  Lying BP: --/-- HR: --  Sitting BP: 135/74 HR: 79  Standing BP: 134/82 HR: 82  Site: --  Mode: --  Orthostatic VS  12-05-23 @ 08:45  Lying BP: 121/74 HR: 62  Sitting BP: 132/73 HR: 77  Standing BP: --/-- HR: --  Site: --  Mode: --  
Vital Signs Last 24 Hrs  T(C): 36.5 (12-11-23 @ 08:44), Max: 36.5 (12-11-23 @ 08:44)  T(F): 97.7 (12-11-23 @ 08:44), Max: 97.7 (12-11-23 @ 08:44)  HR: --  BP: --  BP(mean): --  RR: --  SpO2: --    Orthostatic VS  12-11-23 @ 08:44  Lying BP: --/-- HR: --  Sitting BP: 125/75 HR: 97  Standing BP: 129/79 HR: 103  Site: --  Mode: --  Orthostatic VS  12-10-23 @ 08:27  Lying BP: --/-- HR: --  Sitting BP: 149/79 HR: 89  Standing BP: 141/81 HR: 93  Site: --  Mode: --  
Vital Signs Last 24 Hrs  T(C): 37.2 (12-07-23 @ 08:45), Max: 37.2 (12-07-23 @ 08:45)  T(F): 98.9 (12-07-23 @ 08:45), Max: 98.9 (12-07-23 @ 08:45)  HR: --  BP: --  BP(mean): --  RR: 20 (12-06-23 @ 20:36) (20 - 20)  SpO2: 99% (12-06-23 @ 20:36) (99% - 99%)    Orthostatic VS  12-07-23 @ 08:45  Lying BP: --/-- HR: --  Sitting BP: 134/85 HR: 90  Standing BP: 122/74 HR: 101  Site: --  Mode: --  Orthostatic VS  12-06-23 @ 20:36  Lying BP: --/-- HR: --  Sitting BP: 138/80 HR: 103  Standing BP: 142/84 HR: 104  Site: --  Mode: --  Orthostatic VS  12-06-23 @ 08:49  Lying BP: --/-- HR: --  Sitting BP: 144/85 HR: 82  Standing BP: 147/80 HR: 71  Site: --  Mode: --  Orthostatic VS  12-05-23 @ 21:11  Lying BP: --/-- HR: --  Sitting BP: 135/74 HR: 79  Standing BP: 134/82 HR: 82  Site: --  Mode: --

## 2023-12-11 NOTE — BH INPATIENT PSYCHIATRY DISCHARGE NOTE - NSBHFUPINTERVALHXFT_PSY_A_CORE
Chart reviewed including pertinent labs, imaging, ekg. case discussed with treatment team.  Over this interval: no behavioral issues. pt looking forward to discharge. denies AVH. reflects on the need for medications and states he will try to abstain from substance use. no a/e to medications. he is aware of his outpatient appointment. no manic, psychotic or depressive symptoms present

## 2023-12-11 NOTE — BH INPATIENT PSYCHIATRY PROGRESS NOTE - NSBHMETABOLIC_PSY_ALL_CORE_FT
BMI:   HbA1c:   Glucose:   BP: --Vital Signs Last 24 Hrs  T(C): 36.5 (12-11-23 @ 08:44), Max: 36.5 (12-11-23 @ 08:44)  T(F): 97.7 (12-11-23 @ 08:44), Max: 97.7 (12-11-23 @ 08:44)  HR: --  BP: --  BP(mean): --  RR: --  SpO2: --    Orthostatic VS  12-11-23 @ 08:44  Lying BP: --/-- HR: --  Sitting BP: 125/75 HR: 97  Standing BP: 129/79 HR: 103  Site: --  Mode: --  Orthostatic VS  12-10-23 @ 08:27  Lying BP: --/-- HR: --  Sitting BP: 149/79 HR: 89  Standing BP: 141/81 HR: 93  Site: --  Mode: --    Lipid Panel:

## 2023-12-11 NOTE — BH INPATIENT PSYCHIATRY DISCHARGE NOTE - NSDCCPCAREPLAN_GEN_ALL_CORE_FT
PRINCIPAL DISCHARGE DIAGNOSIS  Diagnosis: Severe bipolar affective disorder with psychosis  Assessment and Plan of Treatment: Invega sustenna + Depakote    
MED

## 2023-12-11 NOTE — BH INPATIENT PSYCHIATRY DISCHARGE NOTE - MSE UNSTRUCTURED FT
Appearance: Dressed appropriately.  Attitude / Behavior / relatedness: cooperative   Eye contact:good   Motor: No abnormal movements, no psychomotor slowing or activation.  Speech: Regular rate.  Mood: "great"  Affect: euthymic  Thought Process: organized and linear   Thought Content: denies SI and HI   Perceptual: denies AVH   Insight: better  Judgment: appropriate  Impulse control:   good   Cognition: grossly intact  Gait: Intact.

## 2023-12-11 NOTE — BH INPATIENT PSYCHIATRY PROGRESS NOTE - NSTXPSYCHOGOAL_PSY_ALL_CORE
Will ask for PRN medication to manage hallucinations

## 2023-12-11 NOTE — BH INPATIENT PSYCHIATRY PROGRESS NOTE - NSTXPROBPSYCHO_PSY_ALL_CORE
PSYCHOTIC SYMPTOMS
Tazorac Pregnancy And Lactation Text: This medication is not safe during pregnancy. It is unknown if this medication is excreted in breast milk.

## 2023-12-11 NOTE — BH INPATIENT PSYCHIATRY PROGRESS NOTE - NSTXDISORGGOAL_PSY_ALL_CORE
Will make at least 3 goal and reality oriented statements during therapy

## 2023-12-11 NOTE — BH INPATIENT PSYCHIATRY DISCHARGE NOTE - HOSPITAL COURSE
Patient admitted with concerns for decompensated psychosis in setting of cannabis use and poor adherence to psychiatric medications. Of note, patient was discharged from [facility] several days prior to readmission to [facility]. Initially, patient was guarded, poor cooperation, quick to becoming agitated, thought disordered, poor boundaries. He was resumed on Invega ultimately titrated to 12mg qhs. Depakote was started and adjusted ultimately to 750mg BID. Patient initially reported that during his hospitalization at [facility] he received one dose of Invega Sustenna but this appeared to be deceit and we confirmed with Dr. Sidhu at [facility] that patient did not receive Invega Sustenna because he refused it. He ultimately agreed to Invega Sustenna and received both loading doses 234mg and 156mg on 12/6/23 and 12/12/23, respectfully. There were discreet and isolated episodes of agitation and anger that resolved on it's own without medication intervention. chart review of past hospitalizations, current presentation, and collateral indicative of a formal dx of Intermittent explosive disorder. Patient's presentation consistent with mood disorder with psychosis i/s/o poor medication adherence further exacerbated by substance use. pt stable for ongoing care in a setting less restrictive     Medications on discharge:   Invega Sustenna 156mg next due 1/9/24  Depakote DR 750mg BID     Discharge planning conducted with patient in collaboration with treatment team. Patient is instructed about aftercare appointment at [facility]     Safety planning conducted with pt, and discharge was discussed with multi-disciplinary team. Pt was visible on unit and social with select peers, attended groups, has been in appropriate behavioral control, was future oriented, and on discharge able to consistently deny suicidality and homicidality, and maintained ADLs independently. Patient progressed well and their symptoms stabilized by time of discharge. Patient was adherent with medications and by day of discharge, did not have any active psychiatric symptoms. Patient with improved mental status exam and with improved insight judgement and impulse control. Patient able to participate in the modalities of therapy offered in this inpatient setting. They are able to participate in safe disposition planning. At present, patient is not a danger to self or others, has achieved optimal benefits from hospitalization, and thus appropriate for less restrictive level of care. Psychoed provided to patient regarding diagnosis, need for ongoing treatment including medications and therapy, to refrain from driving until they speak with their psychiatrist and are well adjusted to medications.     Patient is at chronically elevated risk for self-harm due to  psych dx, hx of non-adherence to treatment, substance use hx, ??personality pathology, financial stressors, hx of impulsivity, poor insight when non-adherent to medications. At this time, those risks are mitigated by patient’s expressed desire to live. Patient currently denies SIIP.    Protective factors that mitigate acute risk includes current stability of symptoms, adherence to medications at this time, no known SA, future oriented thinking, improved frustration tolerance as evidenced by no behavioral issues on the unit,  improved insight and judgement, abstinence from substance while in a controlled setting and with intact reality testing, Zoroastrian and spirituality, supportive family members, access to treatment and care, no access to fire arms    At present, patient has remained in good behavioral control while inpatient. They have not recently displayed any concerning symptoms of behavioral dysregulation or decompensation, has been actively participating in the milieu, expressing future oriented thinking and is insightful about self-harm. Currently, patient is adamant about their denial of suicidal ideation intent or plan. They are not at acutely elevated risk for self-harm at this time.     low acute risk for harm to others. risk factors include male sex, hx of aggression, substance use, past legal hx, cluster B traits. protective factors include not endorsing harm to others at this time, has intact reality testing, understands the consequences of harm to others, no access to firearms. Patient admitted with concerns for decompensated psychosis in setting of cannabis use and poor adherence to psychiatric medications. Of note, patient was discharged from [facility] several days prior to readmission to [facility]. Initially, patient was guarded, poor cooperation, quick to becoming agitated, thought disordered, poor boundaries. He was resumed on Invega ultimately titrated to 12mg qhs. Depakote was started and adjusted ultimately to 750mg BID. Patient initially reported that during his hospitalization at [facility] he received one dose of Invega Sustenna but this appeared to be deceit and we confirmed with Dr. Sidhu at [facility] that patient did not receive Invega Sustenna because he refused it. He ultimately agreed to Invega Sustenna and received both loading doses 234mg and 156mg on 12/6/23 and 12/12/23, respectfully. There were discreet and isolated episodes of agitation and anger that resolved on it's own without medication intervention. chart review of past hospitalizations, current presentation, and collateral indicative of a formal dx of Intermittent explosive disorder. Patient's presentation consistent with mood disorder with psychosis i/s/o poor medication adherence further exacerbated by substance use. pt stable for ongoing care in a setting less restrictive     Medications on discharge:   Invega Sustenna 156mg next due 1/9/24  Depakote DR 750mg BID     Discharge planning conducted with patient in collaboration with treatment team. Patient is instructed about aftercare appointment at [facility]     Safety planning conducted with pt, and discharge was discussed with multi-disciplinary team. Pt was visible on unit and social with select peers, attended groups, has been in appropriate behavioral control, was future oriented, and on discharge able to consistently deny suicidality and homicidality, and maintained ADLs independently. Patient progressed well and their symptoms stabilized by time of discharge. Patient was adherent with medications and by day of discharge, did not have any active psychiatric symptoms. Patient with improved mental status exam and with improved insight judgement and impulse control. Patient able to participate in the modalities of therapy offered in this inpatient setting. They are able to participate in safe disposition planning. At present, patient is not a danger to self or others, has achieved optimal benefits from hospitalization, and thus appropriate for less restrictive level of care. Psychoed provided to patient regarding diagnosis, need for ongoing treatment including medications and therapy, to refrain from driving until they speak with their psychiatrist and are well adjusted to medications.     Patient is at chronically elevated risk for self-harm due to  psych dx, hx of non-adherence to treatment, substance use hx, ??personality pathology, financial stressors, hx of impulsivity, poor insight when non-adherent to medications. At this time, those risks are mitigated by patient’s expressed desire to live. Patient currently denies SIIP.    Protective factors that mitigate acute risk includes current stability of symptoms, adherence to medications at this time, no known SA, future oriented thinking, improved frustration tolerance as evidenced by no behavioral issues on the unit,  improved insight and judgement, abstinence from substance while in a controlled setting and with intact reality testing, Yazdanism and spirituality, supportive family members, access to treatment and care, no access to fire arms    At present, patient has remained in good behavioral control while inpatient. They have not recently displayed any concerning symptoms of behavioral dysregulation or decompensation, has been actively participating in the milieu, expressing future oriented thinking and is insightful about self-harm. Currently, patient is adamant about their denial of suicidal ideation intent or plan. They are not at acutely elevated risk for self-harm at this time.     low acute risk for harm to others. risk factors include male sex, hx of aggression, substance use, past legal hx, cluster B traits. protective factors include not endorsing harm to others at this time, has intact reality testing, understands the consequences of harm to others, no access to firearms.

## 2023-12-11 NOTE — BH INPATIENT PSYCHIATRY PROGRESS NOTE - NSTXPSYCHOINTERMD_PSY_ALL_CORE
medications + therapy

## 2023-12-11 NOTE — BH INPATIENT PSYCHIATRY DISCHARGE NOTE - NSBHDCBILLING_PSY_ALL_CORE
62366 (Hospital discharge day management; 30 min or less) 84337 (Hospital discharge day management; 30 min or less)

## 2023-12-11 NOTE — BH INPATIENT PSYCHIATRY DISCHARGE NOTE - OTHER PAST PSYCHIATRIC HISTORY (INCLUDE DETAILS REGARDING ONSET, COURSE OF ILLNESS, INPATIENT/OUTPATIENT TREATMENT)
According to  assessment, "Patient is a 27yo male, single, lives with father, recently unemployed, non-caregiver, with PPHx of Schizoaffective disorder bipolar type, and cannabis abuse, multiple past psych hospitalizations recently discharged from [facility] on 11/29/23, chronic history of medication noncompliance, was on Invega Sustenna until June 2023, no history of self-injurious behavior or suicide attempts, documented history of property destruction & aggression/violence, history of probation for reckless driving approximately 5 years ago and has history of arrests for stealing and reynoso larceny, h/o daily marijuana use, brought in by EMS, activated by father, due to agitation and disorganized behavior at home." According to  assessment, "Patient is a 29yo male, single, lives with father, recently unemployed, non-caregiver, with PPHx of Schizoaffective disorder bipolar type, and cannabis abuse, multiple past psych hospitalizations recently discharged from [facility] on 11/29/23, chronic history of medication noncompliance, was on Invega Sustenna until June 2023, no history of self-injurious behavior or suicide attempts, documented history of property destruction & aggression/violence, history of probation for reckless driving approximately 5 years ago and has history of arrests for stealing and reynoso larceny, h/o daily marijuana use, brought in by EMS, activated by father, due to agitation and disorganized behavior at home."

## 2023-12-11 NOTE — BH INPATIENT PSYCHIATRY DISCHARGE NOTE - NSBHDCHANDOFFFT_PSY_ALL_CORE
handoff and dc summary provided to CASSIE. inpatient team can be contacted at [phone]  handoff and dc summary provided to CASSIE. inpatient team can be contacted at [phone]

## 2023-12-11 NOTE — BH INPATIENT PSYCHIATRY DISCHARGE NOTE - NSDCMRMEDTOKEN_GEN_ALL_CORE_FT
Depakote  mg oral tablet, extended release: 3 tab(s) orally once a day (at bedtime) total 1500mg at night  Invega Sustenna 156 mg/mL intramuscular suspension, extended release: 156 milligram(s) intramuscularly every 28 days next due 1/9/23

## 2023-12-11 NOTE — BH INPATIENT PSYCHIATRY PROGRESS NOTE - NSBHATTESTTYPEVISIT_PSY_A_CORE
Attending Only
Attending Only
Attending with Resident/Fellow/Student

## 2023-12-11 NOTE — BH INPATIENT PSYCHIATRY PROGRESS NOTE - NSBHCONSDANGEROTHERS_PSY_A_CORE
high risk for assault

## 2023-12-12 VITALS — TEMPERATURE: 98 F

## 2023-12-12 LAB
VALPROATE SERPL-MCNC: 102.9 UG/ML — HIGH (ref 50–100)
VALPROATE SERPL-MCNC: 102.9 UG/ML — HIGH (ref 50–100)

## 2023-12-12 RX ADMIN — DIVALPROEX SODIUM 750 MILLIGRAM(S): 500 TABLET, DELAYED RELEASE ORAL at 08:53

## 2023-12-12 RX ADMIN — PALIPERIDONE 156 MILLIGRAM(S): 1.5 TABLET, EXTENDED RELEASE ORAL at 09:37

## 2023-12-12 NOTE — BH SAFETY PLAN - STEP 5 CRISIS
Trilobed Flap Text: The defect edges were debeveled with a #15 scalpel blade.  Given the location of the defect and the proximity to free margins a trilobed flap was deemed most appropriate.  Using a sterile surgical marker, an appropriate trilobed flap drawn around the defect.    The area thus outlined was incised deep to adipose tissue with a #15 scalpel blade.  The skin margins were undermined to an appropriate distance in all directions utilizing iris scissors. Advancement Flap (Single) Text: The defect edges were debeveled with a #15 scalpel blade.  Given the location of the defect and the proximity to free margins a single advancement flap was deemed most appropriate.  Using a sterile surgical marker, an appropriate advancement flap was drawn incorporating the defect and placing the expected incisions within the relaxed skin tension lines where possible.    The area thus outlined was incised deep to adipose tissue with a #15 scalpel blade.  The skin margins were undermined to an appropriate distance in all directions utilizing iris scissors. Orbicularis Oris Muscle Flap Text: The defect edges were debeveled with a #15 scalpel blade.  Given that the defect affected the competency of the oral sphincter an orbicularis oris muscle flap was deemed most appropriate to restore this competency and normal muscle function.  Using a sterile surgical marker, an appropriate flap was drawn incorporating the defect. The area thus outlined was incised with a #15 scalpel blade. . Double O-Z Plasty Text: The defect edges were debeveled with a #15 scalpel blade.  Given the location of the defect, shape of the defect and the proximity to free margins a Double O-Z plasty (double transposition flap) was deemed most appropriate.  Using a sterile surgical marker, the appropriate transposition flaps were drawn incorporating the defect and placing the expected incisions within the relaxed skin tension lines where possible. The area thus outlined was incised deep to adipose tissue with a #15 scalpel blade.  The skin margins were undermined to an appropriate distance in all directions utilizing iris scissors.  Hemostasis was achieved with electrocautery.  The flaps were then transposed into place, one clockwise and the other counterclockwise, and anchored with interrupted buried subcutaneous sutures. M-Plasty Intermediate Repair Preamble Text (Leave Blank If You Do Not Want): Undermining was performed with blunt dissection. Lip Wedge Excision Repair Text: Given the location of the defect and the proximity to free margins a full thickness wedge repair was deemed most appropriate.  Using a sterile surgical marker, the appropriate repair was drawn incorporating the defect and placing the expected incisions perpendicular to the vermilion border.  The vermilion border was also meticulously outlined to ensure appropriate reapproximation during the repair.  The area thus outlined was incised through and through with a #15 scalpel blade.  The muscularis and dermis were reaproximated with deep sutures following hemostasis. Care was taken to realign the vermilion border before proceeding with the superficial closure.  Once the vermilion was realigned the superfical and mucosal closure was finished. Length To Time In Minutes Device Was In Place: 10 Helical Rim Text: The closure involved the helical rim. Modified Advancement Flap Text: The defect edges were debeveled with a #15 scalpel blade.  Given the location of the defect, shape of the defect and the proximity to free margins a modified advancement flap was deemed most appropriate.  Using a sterile surgical marker, an appropriate advancement flap was drawn incorporating the defect and placing the expected incisions within the relaxed skin tension lines where possible.    The area thus outlined was incised deep to adipose tissue with a #15 scalpel blade.  The skin margins were undermined to an appropriate distance in all directions utilizing iris scissors. Post-Care Instructions: I reviewed with the patient in detail post-care instructions. Patient is not to engage in any heavy lifting, exercise, or swimming for the next 14 days. Should the patient develop any fevers, chills, bleeding, severe pain patient will contact the office immediately. Detail Level: Detailed Validate Note Data (See Information Below): Yes Secondary Defect Width (In Cm): 0 Eye Clamp Note Details: An eye clamp was used during the procedure. Partial Purse String (Intermediate) Text: Given the location of the defect and the characteristics of the surrounding skin an intermediate purse string closure was deemed most appropriate.  Undermining was performed circumferentially around the surgical defect.  A purse string suture was then placed and tightened. Wound tension of the circular defect prevented complete closure of the wound. Number Of Deep Sutures (Optional): 4 Date Of Previous Biopsy (Optional): 05/24/22 Undermining Type: Entire Wound Curvilinear Excision Additional Text (Leave Blank If You Do Not Want): The margin was drawn around the clinically apparent lesion.  A curvilinear shape was then drawn on the skin incorporating the lesion and margins.  Incisions were then made along these lines to the appropriate tissue plane and the lesion was extirpated. Complex Repair And V-Y Plasty Text: The defect edges were debeveled with a #15 scalpel blade.  The primary defect was closed partially with a complex linear closure.  Given the location of the remaining defect, shape of the defect and the proximity to free margins a V-Y plasty was deemed most appropriate for complete closure of the defect.  Using a sterile surgical marker, an appropriate advancement flap was drawn incorporating the defect and placing the expected incisions within the relaxed skin tension lines where possible.    The area thus outlined was incised deep to adipose tissue with a #15 scalpel blade.  The skin margins were undermined to an appropriate distance in all directions utilizing iris scissors. Complex Repair And Skin Substitute Graft Text: The defect edges were debeveled with a #15 scalpel blade.  The primary defect was closed partially with a complex linear closure.  Given the location of the remaining defect, shape of the defect and the proximity to free margins a skin substitute graft was deemed most appropriate to repair the remaining defect.  The graft was trimmed to fit the size of the remaining defect.  The graft was then placed in the primary defect, oriented appropriately, and sutured into place. Split-Thickness Skin Graft Text: The defect edges were debeveled with a #15 scalpel blade.  Given the location of the defect, shape of the defect and the proximity to free margins a split thickness skin graft was deemed most appropriate.  Using a sterile surgical marker, the primary defect shape was transferred to the donor site. The split thickness graft was then harvested.  The skin graft was then placed in the primary defect and oriented appropriately. Island Pedicle Flap Text: The defect edges were debeveled with a #15 scalpel blade.  Given the location of the defect, shape of the defect and the proximity to free margins an island pedicle advancement flap was deemed most appropriate.  Using a sterile surgical marker, an appropriate advancement flap was drawn incorporating the defect, outlining the appropriate donor tissue and placing the expected incisions within the relaxed skin tension lines where possible.    The area thus outlined was incised deep to adipose tissue with a #15 scalpel blade.  The skin margins were undermined to an appropriate distance in all directions around the primary defect and laterally outward around the island pedicle utilizing iris scissors.  There was minimal undermining beneath the pedicle flap. Burow's Advancement Flap Text: The defect edges were debeveled with a #15 scalpel blade.  Given the location of the defect and the proximity to free margins a Burow's advancement flap was deemed most appropriate.  Using a sterile surgical marker, the appropriate advancement flap was drawn incorporating the defect and placing the expected incisions within the relaxed skin tension lines where possible.    The area thus outlined was incised deep to adipose tissue with a #15 scalpel blade.  The skin margins were undermined to an appropriate distance in all directions utilizing iris scissors. Show Primary And Secondary Defect Sizes Variable (Do Not Hide If You Perform Flaps Or Graft Closures): No Rhombic Flap Text: The defect edges were debeveled with a #15 scalpel blade.  Given the location of the defect and the proximity to free margins a rhombic flap was deemed most appropriate.  Using a sterile surgical marker, an appropriate rhombic flap was drawn incorporating the defect.    The area thus outlined was incised deep to adipose tissue with a #15 scalpel blade.  The skin margins were undermined to an appropriate distance in all directions utilizing iris scissors. H Plasty Text: Given the location of the defect, shape of the defect and the proximity to free margins a H-plasty was deemed most appropriate for repair.  Using a sterile surgical marker, the appropriate advancement arms of the H-plasty were drawn incorporating the defect and placing the expected incisions within the relaxed skin tension lines where possible. The area thus outlined was incised deep to adipose tissue with a #15 scalpel blade. The skin margins were undermined to an appropriate distance in all directions utilizing iris scissors.  The opposing advancement arms were then advanced into place in opposite direction and anchored with interrupted buried subcutaneous sutures. Epidermal Closure Graft Donor Site (Optional): simple interrupted Peng Advancement Flap Text: The defect edges were debeveled with a #15 scalpel blade.  Given the location of the defect, shape of the defect and the proximity to free margins a Peng advancement flap was deemed most appropriate.  Using a sterile surgical marker, an appropriate advancement flap was drawn incorporating the defect and placing the expected incisions within the relaxed skin tension lines where possible. The area thus outlined was incised deep to adipose tissue with a #15 scalpel blade.  The skin margins were undermined to an appropriate distance in all directions utilizing iris scissors. Complex Repair And Single Advancement Flap Text: The defect edges were debeveled with a #15 scalpel blade.  The primary defect was closed partially with a complex linear closure.  Given the location of the remaining defect, shape of the defect and the proximity to free margins a single advancement flap was deemed most appropriate for complete closure of the defect.  Using a sterile surgical marker, an appropriate advancement flap was drawn incorporating the defect and placing the expected incisions within the relaxed skin tension lines where possible.    The area thus outlined was incised deep to adipose tissue with a #15 scalpel blade.  The skin margins were undermined to an appropriate distance in all directions utilizing iris scissors. Nostril Rim Text: The closure involved the nostril rim. Complex Repair And Double M Plasty Text: The defect edges were debeveled with a #15 scalpel blade.  The primary defect was closed partially with a complex linear closure.  Given the location of the remaining defect, shape of the defect and the proximity to free margins a double M plasty was deemed most appropriate for complete closure of the defect.  Using a sterile surgical marker, an appropriate advancement flap was drawn incorporating the defect and placing the expected incisions within the relaxed skin tension lines where possible.    The area thus outlined was incised deep to adipose tissue with a #15 scalpel blade.  The skin margins were undermined to an appropriate distance in all directions utilizing iris scissors. Dermal Closure: buried vertical mattress Crescentic Advancement Flap Text: The defect edges were debeveled with a #15 scalpel blade.  Given the location of the defect and the proximity to free margins a crescentic advancement flap was deemed most appropriate.  Using a sterile surgical marker, the appropriate advancement flap was drawn incorporating the defect and placing the expected incisions within the relaxed skin tension lines where possible.    The area thus outlined was incised deep to adipose tissue with a #15 scalpel blade.  The skin margins were undermined to an appropriate distance in all directions utilizing iris scissors. Bi-Rhombic Flap Text: The defect edges were debeveled with a #15 scalpel blade.  Given the location of the defect and the proximity to free margins a bi-rhombic flap was deemed most appropriate.  Using a sterile surgical marker, an appropriate rhombic flap was drawn incorporating the defect. The area thus outlined was incised deep to adipose tissue with a #15 scalpel blade.  The skin margins were undermined to an appropriate distance in all directions utilizing iris scissors. Pre-Excision Curettage Text (Leave Blank If You Do Not Want): Prior to drawing the surgical margin the visible lesion was removed with electrodesiccation and curettage to clearly define the lesion size. Rotation Flap Text: The defect edges were debeveled with a #15 scalpel blade.  Given the location of the defect, shape of the defect and the proximity to free margins a rotation flap was deemed most appropriate.  Using a sterile surgical marker, an appropriate rotation flap was drawn incorporating the defect and placing the expected incisions within the relaxed skin tension lines where possible.    The area thus outlined was incised deep to adipose tissue with a #15 scalpel blade.  The skin margins were undermined to an appropriate distance in all directions utilizing iris scissors. Repair Anesthesia Method: local infiltration Elliptical Excision Additional Text (Leave Blank If You Do Not Want): The margin was drawn around the clinically apparent lesion.  An elliptical shape was then drawn on the skin incorporating the lesion and margins.  Incisions were then made along these lines to the appropriate tissue plane and the lesion was extirpated. Alar Island Pedicle Flap Text: The defect edges were debeveled with a #15 scalpel blade.  Given the location of the defect, shape of the defect and the proximity to the alar rim an island pedicle advancement flap was deemed most appropriate.  Using a sterile surgical marker, an appropriate advancement flap was drawn incorporating the defect, outlining the appropriate donor tissue and placing the expected incisions within the nasal ala running parallel to the alar rim. The area thus outlined was incised with a #15 scalpel blade.  The skin margins were undermined minimally to an appropriate distance in all directions around the primary defect and laterally outward around the island pedicle utilizing iris scissors.  There was minimal undermining beneath the pedicle flap. Complex Repair And Modified Advancement Flap Text: The defect edges were debeveled with a #15 scalpel blade.  The primary defect was closed partially with a complex linear closure.  Given the location of the remaining defect, shape of the defect and the proximity to free margins a modified advancement flap was deemed most appropriate for complete closure of the defect.  Using a sterile surgical marker, an appropriate advancement flap was drawn incorporating the defect and placing the expected incisions within the relaxed skin tension lines where possible.    The area thus outlined was incised deep to adipose tissue with a #15 scalpel blade.  The skin margins were undermined to an appropriate distance in all directions utilizing iris scissors. Lab: [id number] Complex Repair And Z Plasty Text: The defect edges were debeveled with a #15 scalpel blade.  The primary defect was closed partially with a complex linear closure.  Given the location of the remaining defect, shape of the defect and the proximity to free margins a Z plasty was deemed most appropriate for complete closure of the defect.  Using a sterile surgical marker, an appropriate advancement flap was drawn incorporating the defect and placing the expected incisions within the relaxed skin tension lines where possible.    The area thus outlined was incised deep to adipose tissue with a #15 scalpel blade.  The skin margins were undermined to an appropriate distance in all directions utilizing iris scissors. Hemigard Retention Suture: 2-0 Nylon Z Plasty Text: The lesion was extirpated to the level of the fat with a #15 scalpel blade.  Given the location of the defect, shape of the defect and the proximity to free margins a Z-plasty was deemed most appropriate for repair.  Using a sterile surgical marker, the appropriate transposition arms of the Z-plasty were drawn incorporating the defect and placing the expected incisions within the relaxed skin tension lines where possible.    The area thus outlined was incised deep to adipose tissue with a #15 scalpel blade.  The skin margins were undermined to an appropriate distance in all directions utilizing iris scissors.  The opposing transposition arms were then transposed into place in opposite direction and anchored with interrupted buried subcutaneous sutures. Cartilage Graft Text: The defect edges were debeveled with a #15 scalpel blade.  Given the location of the defect, shape of the defect, the fact the defect involved a full thickness cartilage defect a cartilage graft was deemed most appropriate.  An appropriate donor site was identified, cleansed, and anesthetized. The cartilage graft was then harvested and transferred to the recipient site, oriented appropriately and then sutured into place.  The secondary defect was then repaired using a primary closure. Epidermal Sutures: 4-0 Ethilon Billing Type: Third-Party Bill Slit Excision Additional Text (Leave Blank If You Do Not Want): A linear line was drawn on the skin overlying the lesion. An incision was made slowly until the lesion was visualized.  Once visualized, the lesion was removed with blunt dissection. Island Pedicle Flap-Requiring Vessel Identification Text: The defect edges were debeveled with a #15 scalpel blade.  Given the location of the defect, shape of the defect and the proximity to free margins an island pedicle advancement flap was deemed most appropriate.  Using a sterile surgical marker, an appropriate advancement flap was drawn, based on the axial vessel mentioned above, incorporating the defect, outlining the appropriate donor tissue and placing the expected incisions within the relaxed skin tension lines where possible.    The area thus outlined was incised deep to adipose tissue with a #15 scalpel blade.  The skin margins were undermined to an appropriate distance in all directions around the primary defect and laterally outward around the island pedicle utilizing iris scissors.  There was minimal undermining beneath the pedicle flap. Lab Facility: [id number] Complex Repair And Dorsal Nasal Flap Text: The defect edges were debeveled with a #15 scalpel blade.  The primary defect was closed partially with a complex linear closure.  Given the location of the remaining defect, shape of the defect and the proximity to free margins a dorsal nasal flap was deemed most appropriate for complete closure of the defect.  Using a sterile surgical marker, an appropriate flap was drawn incorporating the defect and placing the expected incisions within the relaxed skin tension lines where possible.    The area thus outlined was incised deep to adipose tissue with a #15 scalpel blade.  The skin margins were undermined to an appropriate distance in all directions utilizing iris scissors. Crescentic Complex Repair Preamble Text (Leave Blank If You Do Not Want): Extensive wide undermining was performed. Star Wedge Flap Text: The defect edges were debeveled with a #15 scalpel blade.  Given the location of the defect, shape of the defect and the proximity to free margins a star wedge flap was deemed most appropriate.  Using a sterile surgical marker, an appropriate rotation flap was drawn incorporating the defect and placing the expected incisions within the relaxed skin tension lines where possible. The area thus outlined was incised deep to adipose tissue with a #15 scalpel blade.  The skin margins were undermined to an appropriate distance in all directions utilizing iris scissors. Size Of Lesion In Cm: 0.9 Path Notes (To The Dermatopathologist): Please check margins. Ear Star Wedge Flap Text: The defect edges were debeveled with a #15 blade scalpel.  Given the location of the defect and the proximity to free margins (helical rim) an ear star wedge flap was deemed most appropriate.  Using a sterile surgical marker, the appropriate flap was drawn incorporating the defect and placing the expected incisions between the helical rim and antihelix where possible.  The area thus outlined was incised through and through with a #15 scalpel blade. Cheek-To-Nose Interpolation Flap Text: A decision was made to reconstruct the defect utilizing an interpolation axial flap and a staged reconstruction.  A telfa template was made of the defect.  This telfa template was then used to outline the Cheek-To-Nose Interpolation flap.  The donor area for the pedicle flap was then injected with anesthesia.  The flap was excised through the skin and subcutaneous tissue down to the layer of the underlying musculature.  The interpolation flap was carefully excised within this deep plane to maintain its blood supply.  The edges of the donor site were undermined.   The donor site was closed in a primary fashion.  The pedicle was then rotated into position and sutured.  Once the tube was sutured into place, adequate blood supply was confirmed with blanching and refill.  The pedicle was then wrapped with xeroform gauze and dressed appropriately with a telfa and gauze bandage to ensure continued blood supply and protect the attached pedicle. Excisional Biopsy Additional Text (Leave Blank If You Do Not Want): The margin was drawn around the clinically apparent lesion. An elliptical shape was then drawn on the skin incorporating the lesion and margins.  Incisions were then made along these lines to the appropriate tissue plane and the lesion was extirpated. Keystone Flap Text: The defect edges were debeveled with a #15 scalpel blade.  Given the location of the defect, shape of the defect a keystone flap was deemed most appropriate.  Using a sterile surgical marker, an appropriate keystone flap was drawn incorporating the defect, outlining the appropriate donor tissue and placing the expected incisions within the relaxed skin tension lines where possible. The area thus outlined was incised deep to adipose tissue with a #15 scalpel blade.  The skin margins were undermined to an appropriate distance in all directions around the primary defect and laterally outward around the flap utilizing iris scissors. Repair Type: Intermediate O-L Flap Text: The defect edges were debeveled with a #15 scalpel blade.  Given the location of the defect, shape of the defect and the proximity to free margins an O-L flap was deemed most appropriate.  Using a sterile surgical marker, an appropriate advancement flap was drawn incorporating the defect and placing the expected incisions within the relaxed skin tension lines where possible.    The area thus outlined was incised deep to adipose tissue with a #15 scalpel blade.  The skin margins were undermined to an appropriate distance in all directions utilizing iris scissors. Complex Repair And Burow's Graft Text: The defect edges were debeveled with a #15 scalpel blade.  The primary defect was closed partially with a complex linear closure.  Given the location of the defect, shape of the defect, the proximity to free margins and the presence of a standing cone deformity a Burow's graft was deemed most appropriate to repair the remaining defect.  The graft was trimmed to fit the size of the remaining defect.  The graft was then placed in the primary defect, oriented appropriately, and sutured into place. Dermal Autograft Text: The defect edges were debeveled with a #15 scalpel blade.  Given the location of the defect, shape of the defect and the proximity to free margins a dermal autograft was deemed most appropriate.  Using a sterile surgical marker, the primary defect shape was transferred to the donor site. The area thus outlined was incised deep to adipose tissue with a #15 scalpel blade.  The harvested graft was then trimmed of adipose and epidermal tissue until only dermis was left.  The skin graft was then placed in the primary defect and oriented appropriately. Hemostasis: Electrocautery Complex Repair And O-L Flap Text: The defect edges were debeveled with a #15 scalpel blade.  The primary defect was closed partially with a complex linear closure.  Given the location of the remaining defect, shape of the defect and the proximity to free margins an O-L flap was deemed most appropriate for complete closure of the defect.  Using a sterile surgical marker, an appropriate flap was drawn incorporating the defect and placing the expected incisions within the relaxed skin tension lines where possible.    The area thus outlined was incised deep to adipose tissue with a #15 scalpel blade.  The skin margins were undermined to an appropriate distance in all directions utilizing iris scissors. O-Z Plasty Text: The defect edges were debeveled with a #15 scalpel blade.  Given the location of the defect, shape of the defect and the proximity to free margins an O-Z plasty (double transposition flap) was deemed most appropriate.  Using a sterile surgical marker, the appropriate transposition flaps were drawn incorporating the defect and placing the expected incisions within the relaxed skin tension lines where possible.    The area thus outlined was incised deep to adipose tissue with a #15 scalpel blade.  The skin margins were undermined to an appropriate distance in all directions utilizing iris scissors.  Hemostasis was achieved with electrocautery.  The flaps were then transposed into place, one clockwise and the other counterclockwise, and anchored with interrupted buried subcutaneous sutures. Double O-Z Flap Text: The defect edges were debeveled with a #15 scalpel blade.  Given the location of the defect, shape of the defect and the proximity to free margins a Double O-Z flap was deemed most appropriate.  Using a sterile surgical marker, an appropriate transposition flap was drawn incorporating the defect and placing the expected incisions within the relaxed skin tension lines where possible. The area thus outlined was incised deep to adipose tissue with a #15 scalpel blade.  The skin margins were undermined to an appropriate distance in all directions utilizing iris scissors. Muscle Hinge Flap Text: The defect edges were debeveled with a #15 scalpel blade.  Given the size, depth and location of the defect and the proximity to free margins a muscle hinge flap was deemed most appropriate.  Using a sterile surgical marker, an appropriate hinge flap was drawn incorporating the defect. The area thus outlined was incised with a #15 scalpel blade.  The skin margins were undermined to an appropriate distance in all directions utilizing iris scissors. Undermining Location (Optional): in the superficial subcutaneous fat Tissue Cultured Epidermal Autograft Text: The defect edges were debeveled with a #15 scalpel blade.  Given the location of the defect, shape of the defect and the proximity to free margins a tissue cultured epidermal autograft was deemed most appropriate.  The graft was then trimmed to fit the size of the defect.  The graft was then placed in the primary defect and oriented appropriately. Suturegard Body: The suture ends were repeatedly re-tightened and re-clamped to achieve the desired tissue expansion. Size Of Margin In Cm: 0.3 Melolabial Interpolation Flap Text: A decision was made to reconstruct the defect utilizing an interpolation axial flap and a staged reconstruction.  A telfa template was made of the defect.  This telfa template was then used to outline the melolabial interpolation flap.  The donor area for the pedicle flap was then injected with anesthesia.  The flap was excised through the skin and subcutaneous tissue down to the layer of the underlying musculature.  The pedicle flap was carefully excised within this deep plane to maintain its blood supply.  The edges of the donor site were undermined.   The donor site was closed in a primary fashion.  The pedicle was then rotated into position and sutured.  Once the tube was sutured into place, adequate blood supply was confirmed with blanching and refill.  The pedicle was then wrapped with xeroform gauze and dressed appropriately with a telfa and gauze bandage to ensure continued blood supply and protect the attached pedicle. Estimated Blood Loss (Cc): minimal Deep Sutures: 3-0 Vicryl Complex Repair And Melolabial Flap Text: The defect edges were debeveled with a #15 scalpel blade.  The primary defect was closed partially with a complex linear closure.  Given the location of the remaining defect, shape of the defect and the proximity to free margins a melolabial flap was deemed most appropriate for complete closure of the defect.  Using a sterile surgical marker, an appropriate advancement flap was drawn incorporating the defect and placing the expected incisions within the relaxed skin tension lines where possible.    The area thus outlined was incised deep to adipose tissue with a #15 scalpel blade.  The skin margins were undermined to an appropriate distance in all directions utilizing iris scissors. Complex Repair And Epidermal Autograft Text: The defect edges were debeveled with a #15 scalpel blade.  The primary defect was closed partially with a complex linear closure.  Given the location of the defect, shape of the defect and the proximity to free margins an epidermal autograft was deemed most appropriate to repair the remaining defect.  The graft was trimmed to fit the size of the remaining defect.  The graft was then placed in the primary defect, oriented appropriately, and sutured into place. Posterior Auricular Interpolation Flap Text: A decision was made to reconstruct the defect utilizing an interpolation axial flap and a staged reconstruction.  A telfa template was made of the defect.  This telfa template was then used to outline the posterior auricular interpolation flap.  The donor area for the pedicle flap was then injected with anesthesia.  The flap was excised through the skin and subcutaneous tissue down to the layer of the underlying musculature.  The pedicle flap was carefully excised within this deep plane to maintain its blood supply.  The edges of the donor site were undermined.   The donor site was closed in a primary fashion.  The pedicle was then rotated into position and sutured.  Once the tube was sutured into place, adequate blood supply was confirmed with blanching and refill.  The pedicle was then wrapped with xeroform gauze and dressed appropriately with a telfa and gauze bandage to ensure continued blood supply and protect the attached pedicle. Purse String (Intermediate) Text: Given the location of the defect and the characteristics of the surrounding skin a purse string intermediate closure was deemed most appropriate.  Undermining was performed circumferentially around the surgical defect.  A purse string suture was then placed and tightened. Bilobed Flap Text: The defect edges were debeveled with a #15 scalpel blade.  Given the location of the defect and the proximity to free margins a bilobe flap was deemed most appropriate.  Using a sterile surgical marker, an appropriate bilobe flap drawn around the defect.    The area thus outlined was incised deep to adipose tissue with a #15 scalpel blade.  The skin margins were undermined to an appropriate distance in all directions utilizing iris scissors. Advancement-Rotation Flap Text: The defect edges were debeveled with a #15 scalpel blade.  Given the location of the defect, shape of the defect and the proximity to free margins an advancement-rotation flap was deemed most appropriate.  Using a sterile surgical marker, an appropriate flap was drawn incorporating the defect and placing the expected incisions within the relaxed skin tension lines where possible. The area thus outlined was incised deep to adipose tissue with a #15 scalpel blade.  The skin margins were undermined to an appropriate distance in all directions utilizing iris scissors. No Repair - Repaired With Adjacent Surgical Defect Text (Leave Blank If You Do Not Want): After the excision the defect was repaired concurrently with another surgical defect which was in close approximation. Nasal Turnover Hinge Flap Text: The defect edges were debeveled with a #15 scalpel blade.  Given the size, depth, location of the defect and the defect being full thickness a nasal turnover hinge flap was deemed most appropriate.  Using a sterile surgical marker, an appropriate hinge flap was drawn incorporating the defect. The area thus outlined was incised with a #15 scalpel blade. The flap was designed to recreate the nasal mucosal lining and the alar rim. The skin margins were undermined to an appropriate distance in all directions utilizing iris scissors. Complex Repair And Rhombic Flap Text: The defect edges were debeveled with a #15 scalpel blade.  The primary defect was closed partially with a complex linear closure.  Given the location of the remaining defect, shape of the defect and the proximity to free margins a rhombic flap was deemed most appropriate for complete closure of the defect.  Using a sterile surgical marker, an appropriate advancement flap was drawn incorporating the defect and placing the expected incisions within the relaxed skin tension lines where possible.    The area thus outlined was incised deep to adipose tissue with a #15 scalpel blade.  The skin margins were undermined to an appropriate distance in all directions utilizing iris scissors. Retention Suture Bite Size: 3 mm Complex Repair And Tissue Cultured Epidermal Autograft Text: The defect edges were debeveled with a #15 scalpel blade.  The primary defect was closed partially with a complex linear closure.  Given the location of the defect, shape of the defect and the proximity to free margins an tissue cultured epidermal autograft was deemed most appropriate to repair the remaining defect.  The graft was trimmed to fit the size of the remaining defect.  The graft was then placed in the primary defect, oriented appropriately, and sutured into place. Complex Repair And Transposition Flap Text: The defect edges were debeveled with a #15 scalpel blade.  The primary defect was closed partially with a complex linear closure.  Given the location of the remaining defect, shape of the defect and the proximity to free margins a transposition flap was deemed most appropriate for complete closure of the defect.  Using a sterile surgical marker, an appropriate advancement flap was drawn incorporating the defect and placing the expected incisions within the relaxed skin tension lines where possible.    The area thus outlined was incised deep to adipose tissue with a #15 scalpel blade.  The skin margins were undermined to an appropriate distance in all directions utilizing iris scissors. Scalpel Size: 15 blade Purse String (Simple) Text: Given the location of the defect and the characteristics of the surrounding skin a purse string simple closure was deemed most appropriate.  Undermining was performed circumferentially around the surgical defect.  A purse string suture was then placed and tightened. Dorsal Nasal Flap Text: The defect edges were debeveled with a #15 scalpel blade.  Given the location of the defect and the proximity to free margins a dorsal nasal flap was deemed most appropriate.  Using a sterile surgical marker, an appropriate dorsal nasal flap was drawn around the defect.    The area thus outlined was incised deep to adipose tissue with a #15 scalpel blade.  The skin margins were undermined to an appropriate distance in all directions utilizing iris scissors. Advancement Flap (Double) Text: The defect edges were debeveled with a #15 scalpel blade.  Given the location of the defect and the proximity to free margins a double advancement flap was deemed most appropriate.  Using a sterile surgical marker, the appropriate advancement flaps were drawn incorporating the defect and placing the expected incisions within the relaxed skin tension lines where possible.    The area thus outlined was incised deep to adipose tissue with a #15 scalpel blade.  The skin margins were undermined to an appropriate distance in all directions utilizing iris scissors. Wound Care: Petrolatum Complex Repair And Xenograft Text: The defect edges were debeveled with a #15 scalpel blade.  The primary defect was closed partially with a complex linear closure.  Given the location of the defect, shape of the defect and the proximity to free margins a xenograft was deemed most appropriate to repair the remaining defect.  The graft was trimmed to fit the size of the remaining defect.  The graft was then placed in the primary defect, oriented appropriately, and sutured into place. Hemigard Postcare Instructions: The HEMIGARD strips are to remain completely dry for at least 5-7 days. Excision Method: Elliptical Ftsg Text: The defect edges were debeveled with a #15 scalpel blade.  Given the location of the defect, shape of the defect and the proximity to free margins a full thickness skin graft was deemed most appropriate.  Using a sterile surgical marker, the primary defect shape was transferred to the donor site. The area thus outlined was incised deep to adipose tissue with a #15 scalpel blade.  The harvested graft was then trimmed of adipose tissue until only dermis and epidermis was left.  The skin margins of the secondary defect were undermined to an appropriate distance in all directions utilizing iris scissors.  The secondary defect was closed with interrupted buried subcutaneous sutures.  The skin edges were then re-apposed with running  sutures.  The skin graft was then placed in the primary defect and oriented appropriately. Melolabial Transposition Flap Text: The defect edges were debeveled with a #15 scalpel blade.  Given the location of the defect and the proximity to free margins a melolabial flap was deemed most appropriate.  Using a sterile surgical marker, an appropriate melolabial transposition flap was drawn incorporating the defect.    The area thus outlined was incised deep to adipose tissue with a #15 scalpel blade.  The skin margins were undermined to an appropriate distance in all directions utilizing iris scissors. V-Y Plasty Text: The defect edges were debeveled with a #15 scalpel blade.  Given the location of the defect, shape of the defect and the proximity to free margins an V-Y advancement flap was deemed most appropriate.  Using a sterile surgical marker, an appropriate advancement flap was drawn incorporating the defect and placing the expected incisions within the relaxed skin tension lines where possible.    The area thus outlined was incised deep to adipose tissue with a #15 scalpel blade.  The skin margins were undermined to an appropriate distance in all directions utilizing iris scissors. Mucosal Advancement Flap Text: Given the location of the defect, shape of the defect and the proximity to free margins a mucosal advancement flap was deemed most appropriate. Incisions were made with a 15 blade scalpel in the appropriate fashion along the cutaneous vermilion border and the mucosal lip. The remaining actinically damaged mucosal tissue was excised.  The mucosal advancement flap was then elevated to the gingival sulcus with care taken to preserve the neurovascular structures and advanced into the primary defect. Care was taken to ensure that precise realignment of the vermilion border was achieved. Home Suture Removal Text: Patient was provided a home suture removal kit and will remove their sutures at home.  If they have any questions or difficulties they will call the office. Positioning (Leave Blank If You Do Not Want): The patient was placed in a comfortable position exposing the surgical site. Vermilion Border Text: The closure involved the vermilion border. Partial Purse String (Simple) Text: Given the location of the defect and the characteristics of the surrounding skin a simple purse string closure was deemed most appropriate.  Undermining was performed circumferentially around the surgical defect.  A purse string suture was then placed and tightened. Wound tension of the circular defect prevented complete closure of the wound. Complex Repair And M Plasty Text: The defect edges were debeveled with a #15 scalpel blade.  The primary defect was closed partially with a complex linear closure.  Given the location of the remaining defect, shape of the defect and the proximity to free margins an M plasty was deemed most appropriate for complete closure of the defect.  Using a sterile surgical marker, an appropriate advancement flap was drawn incorporating the defect and placing the expected incisions within the relaxed skin tension lines where possible.    The area thus outlined was incised deep to adipose tissue with a #15 scalpel blade.  The skin margins were undermined to an appropriate distance in all directions utilizing iris scissors. Rhomboid Transposition Flap Text: The defect edges were debeveled with a #15 scalpel blade.  Given the location of the defect and the proximity to free margins a rhomboid transposition flap was deemed most appropriate.  Using a sterile surgical marker, an appropriate rhomboid flap was drawn incorporating the defect.    The area thus outlined was incised deep to adipose tissue with a #15 scalpel blade.  The skin margins were undermined to an appropriate distance in all directions utilizing iris scissors. W Plasty Text: The lesion was extirpated to the level of the fat with a #15 scalpel blade.  Given the location of the defect, shape of the defect and the proximity to free margins a W-plasty was deemed most appropriate for repair.  Using a sterile surgical marker, the appropriate transposition arms of the W-plasty were drawn incorporating the defect and placing the expected incisions within the relaxed skin tension lines where possible.    The area thus outlined was incised deep to adipose tissue with a #15 scalpel blade.  The skin margins were undermined to an appropriate distance in all directions utilizing iris scissors.  The opposing transposition arms were then transposed into place in opposite direction and anchored with interrupted buried subcutaneous sutures. Saucerization Depth: dermis and superficial adipose tissue Hatchet Flap Text: The defect edges were debeveled with a #15 scalpel blade.  Given the location of the defect, shape of the defect and the proximity to free margins a hatchet flap was deemed most appropriate.  Using a sterile surgical marker, an appropriate hatchet flap was drawn incorporating the defect and placing the expected incisions within the relaxed skin tension lines where possible.    The area thus outlined was incised deep to adipose tissue with a #15 scalpel blade.  The skin margins were undermined to an appropriate distance in all directions utilizing iris scissors. Dressing: dry sterile dressing Fusiform Excision Additional Text (Leave Blank If You Do Not Want): The margin was drawn around the clinically apparent lesion.  A fusiform shape was then drawn on the skin incorporating the lesion and margins.  Incisions were then made along these lines to the appropriate tissue plane and the lesion was extirpated. Island Pedicle Flap With Canthal Suspension Text: The defect edges were debeveled with a #15 scalpel blade.  Given the location of the defect, shape of the defect and the proximity to free margins an island pedicle advancement flap was deemed most appropriate.  Using a sterile surgical marker, an appropriate advancement flap was drawn incorporating the defect, outlining the appropriate donor tissue and placing the expected incisions within the relaxed skin tension lines where possible. The area thus outlined was incised deep to adipose tissue with a #15 scalpel blade.  The skin margins were undermined to an appropriate distance in all directions around the primary defect and laterally outward around the island pedicle utilizing iris scissors.  There was minimal undermining beneath the pedicle flap. A suspension suture was placed in the canthal tendon to prevent tension and prevent ectropion. Chonodrocutaneous Helical Advancement Flap Text: The defect edges were debeveled with a #15 scalpel blade.  Given the location of the defect and the proximity to free margins a chondrocutaneous helical advancement flap was deemed most appropriate.  Using a sterile surgical marker, the appropriate advancement flap was drawn incorporating the defect and placing the expected incisions within the relaxed skin tension lines where possible.    The area thus outlined was incised deep to adipose tissue with a #15 scalpel blade.  The skin margins were undermined to an appropriate distance in all directions utilizing iris scissors. Complex Repair And W Plasty Text: The defect edges were debeveled with a #15 scalpel blade.  The primary defect was closed partially with a complex linear closure.  Given the location of the remaining defect, shape of the defect and the proximity to free margins a W plasty was deemed most appropriate for complete closure of the defect.  Using a sterile surgical marker, an appropriate advancement flap was drawn incorporating the defect and placing the expected incisions within the relaxed skin tension lines where possible.    The area thus outlined was incised deep to adipose tissue with a #15 scalpel blade.  The skin margins were undermined to an appropriate distance in all directions utilizing iris scissors. Burow's Graft Text: The defect edges were debeveled with a #15 scalpel blade.  Given the location of the defect, shape of the defect, the proximity to free margins and the presence of a standing cone deformity a Burow's skin graft was deemed most appropriate. The standing cone was removed and this tissue was then trimmed to the shape of the primary defect. The adipose tissue was also removed until only dermis and epidermis were left.  The skin margins of the secondary defect were undermined to an appropriate distance in all directions utilizing iris scissors.  The secondary defect was closed with interrupted buried subcutaneous sutures.  The skin edges were then re-apposed with running  sutures.  The skin graft was then placed in the primary defect and oriented appropriately. Complex Repair And Double Advancement Flap Text: The defect edges were debeveled with a #15 scalpel blade.  The primary defect was closed partially with a complex linear closure.  Given the location of the remaining defect, shape of the defect and the proximity to free margins a double advancement flap was deemed most appropriate for complete closure of the defect.  Using a sterile surgical marker, an appropriate advancement flap was drawn incorporating the defect and placing the expected incisions within the relaxed skin tension lines where possible.    The area thus outlined was incised deep to adipose tissue with a #15 scalpel blade.  The skin margins were undermined to an appropriate distance in all directions utilizing iris scissors. Saucerization Excision Additional Text (Leave Blank If You Do Not Want): The margin was drawn around the clinically apparent lesion.  Incisions were then made along these lines, in a tangential fashion, to the appropriate tissue plane and the lesion was extirpated. Double Island Pedicle Flap Text: The defect edges were debeveled with a #15 scalpel blade.  Given the location of the defect, shape of the defect and the proximity to free margins a double island pedicle advancement flap was deemed most appropriate.  Using a sterile surgical marker, an appropriate advancement flap was drawn incorporating the defect, outlining the appropriate donor tissue and placing the expected incisions within the relaxed skin tension lines where possible.    The area thus outlined was incised deep to adipose tissue with a #15 scalpel blade.  The skin margins were undermined to an appropriate distance in all directions around the primary defect and laterally outward around the island pedicle utilizing iris scissors.  There was minimal undermining beneath the pedicle flap. A-T Advancement Flap Text: The defect edges were debeveled with a #15 scalpel blade.  Given the location of the defect, shape of the defect and the proximity to free margins an A-T advancement flap was deemed most appropriate.  Using a sterile surgical marker, an appropriate advancement flap was drawn incorporating the defect and placing the expected incisions within the relaxed skin tension lines where possible.    The area thus outlined was incised deep to adipose tissue with a #15 scalpel blade.  The skin margins were undermined to an appropriate distance in all directions utilizing iris scissors. Retention Suture Text: Retention sutures were placed to support the closure and prevent dehiscence. Composite Graft Text: The defect edges were debeveled with a #15 scalpel blade.  Given the location of the defect, shape of the defect, the proximity to free margins and the fact the defect was full thickness a composite graft was deemed most appropriate.  The defect was outline and then transferred to the donor site.  A full thickness graft was then excised from the donor site. The graft was then placed in the primary defect, oriented appropriately and then sutured into place.  The secondary defect was then repaired using a primary closure. Complex Repair And A-T Advancement Flap Text: The defect edges were debeveled with a #15 scalpel blade.  The primary defect was closed partially with a complex linear closure.  Given the location of the remaining defect, shape of the defect and the proximity to free margins an A-T advancement flap was deemed most appropriate for complete closure of the defect.  Using a sterile surgical marker, an appropriate advancement flap was drawn incorporating the defect and placing the expected incisions within the relaxed skin tension lines where possible.    The area thus outlined was incised deep to adipose tissue with a #15 scalpel blade.  The skin margins were undermined to an appropriate distance in all directions utilizing iris scissors. Spiral Flap Text: The defect edges were debeveled with a #15 scalpel blade.  Given the location of the defect, shape of the defect and the proximity to free margins a spiral flap was deemed most appropriate.  Using a sterile surgical marker, an appropriate rotation flap was drawn incorporating the defect and placing the expected incisions within the relaxed skin tension lines where possible. The area thus outlined was incised deep to adipose tissue with a #15 scalpel blade.  The skin margins were undermined to an appropriate distance in all directions utilizing iris scissors. Anesthesia Type: 1% lidocaine with 1:100,000 epinephrine, 0.5% Marcaine and 8.4% sodium bicarbonate Helical Rim Advancement Flap Text: The defect edges were debeveled with a #15 blade scalpel.  Given the location of the defect and the proximity to free margins (helical rim) a double helical rim advancement flap was deemed most appropriate.  Using a sterile surgical marker, the appropriate advancement flaps were drawn incorporating the defect and placing the expected incisions between the helical rim and antihelix where possible.  The area thus outlined was incised through and through with a #15 scalpel blade.  With a skin hook and iris scissors, the flaps were gently and sharply undermined and freed up. Zygomaticofacial Flap Text: Given the location of the defect, shape of the defect and the proximity to free margins a zygomaticofacial flap was deemed most appropriate for repair.  Using a sterile surgical marker, the appropriate flap was drawn incorporating the defect and placing the expected incisions within the relaxed skin tension lines where possible. The area thus outlined was incised deep to adipose tissue with a #15 scalpel blade with preservation of a vascular pedicle.  The skin margins were undermined to an appropriate distance in all directions utilizing iris scissors.  The flap was then placed into the defect and anchored with interrupted buried subcutaneous sutures. Bilateral Helical Rim Advancement Flap Text: The defect edges were debeveled with a #15 blade scalpel.  Given the location of the defect and the proximity to free margins (helical rim) a bilateral helical rim advancement flap was deemed most appropriate.  Using a sterile surgical marker, the appropriate advancement flaps were drawn incorporating the defect and placing the expected incisions between the helical rim and antihelix where possible.  The area thus outlined was incised through and through with a #15 scalpel blade.  With a skin hook and iris scissors, the flaps were gently and sharply undermined and freed up. Cheek Interpolation Flap Text: A decision was made to reconstruct the defect utilizing an interpolation axial flap and a staged reconstruction.  A telfa template was made of the defect.  This telfa template was then used to outline the Cheek Interpolation flap.  The donor area for the pedicle flap was then injected with anesthesia.  The flap was excised through the skin and subcutaneous tissue down to the layer of the underlying musculature.  The interpolation flap was carefully excised within this deep plane to maintain its blood supply.  The edges of the donor site were undermined.   The donor site was closed in a primary fashion.  The pedicle was then rotated into position and sutured.  Once the tube was sutured into place, adequate blood supply was confirmed with blanching and refill.  The pedicle was then wrapped with xeroform gauze and dressed appropriately with a telfa and gauze bandage to ensure continued blood supply and protect the attached pedicle. Number Of Hemigard Strips Per Side: 1 Epidermal Autograft Text: The defect edges were debeveled with a #15 scalpel blade.  Given the location of the defect, shape of the defect and the proximity to free margins an epidermal autograft was deemed most appropriate.  Using a sterile surgical marker, the primary defect shape was transferred to the donor site. The epidermal graft was then harvested.  The skin graft was then placed in the primary defect and oriented appropriately. O-T Advancement Flap Text: The defect edges were debeveled with a #15 scalpel blade.  Given the location of the defect, shape of the defect and the proximity to free margins an O-T advancement flap was deemed most appropriate.  Using a sterile surgical marker, an appropriate advancement flap was drawn incorporating the defect and placing the expected incisions within the relaxed skin tension lines where possible.    The area thus outlined was incised deep to adipose tissue with a #15 scalpel blade.  The skin margins were undermined to an appropriate distance in all directions utilizing iris scissors. Staged Advancement Flap Text: The defect edges were debeveled with a #15 scalpel blade.  Given the location of the defect, shape of the defect and the proximity to free margins a staged advancement flap was deemed most appropriate.  Using a sterile surgical marker, an appropriate advancement flap was drawn incorporating the defect and placing the expected incisions within the relaxed skin tension lines where possible. The area thus outlined was incised deep to adipose tissue with a #15 scalpel blade.  The skin margins were undermined to an appropriate distance in all directions utilizing iris scissors. Suturegard Intro: Intraoperative tissue expansion was performed, utilizing the SUTUREGARD device, in order to reduce wound tension. Surgeon (Optional): Dr. Abdalla Complex Repair And O-T Advancement Flap Text: The defect edges were debeveled with a #15 scalpel blade.  The primary defect was closed partially with a complex linear closure.  Given the location of the remaining defect, shape of the defect and the proximity to free margins an O-T advancement flap was deemed most appropriate for complete closure of the defect.  Using a sterile surgical marker, an appropriate advancement flap was drawn incorporating the defect and placing the expected incisions within the relaxed skin tension lines where possible.    The area thus outlined was incised deep to adipose tissue with a #15 scalpel blade.  The skin margins were undermined to an appropriate distance in all directions utilizing iris scissors. Complex Repair And Ftsg Text: The defect edges were debeveled with a #15 scalpel blade.  The primary defect was closed partially with a complex linear closure.  Given the location of the defect, shape of the defect and the proximity to free margins a full thickness skin graft was deemed most appropriate to repair the remaining defect.  The graft was trimmed to fit the size of the remaining defect.  The graft was then placed in the primary defect, oriented appropriately, and sutured into place. Graft Donor Site Bandage (Optional-Leave Blank If You Don't Want In Note): Steri-strips and a pressure bandage were applied to the donor site. O-Z Flap Text: The defect edges were debeveled with a #15 scalpel blade.  Given the location of the defect, shape of the defect and the proximity to free margins an O-Z flap was deemed most appropriate.  Using a sterile surgical marker, an appropriate transposition flap was drawn incorporating the defect and placing the expected incisions within the relaxed skin tension lines where possible. The area thus outlined was incised deep to adipose tissue with a #15 scalpel blade.  The skin margins were undermined to an appropriate distance in all directions utilizing iris scissors. Anesthesia Volume In Cc: 6 Intermediate / Complex Repair - Final Wound Length In Cm: 5.6 Transposition Flap Text: The defect edges were debeveled with a #15 scalpel blade.  Given the location of the defect and the proximity to free margins a transposition flap was deemed most appropriate.  Using a sterile surgical marker, an appropriate transposition flap was drawn incorporating the defect.    The area thus outlined was incised deep to adipose tissue with a #15 scalpel blade.  The skin margins were undermined to an appropriate distance in all directions utilizing iris scissors. X Size Of Lesion In Cm (Optional): 0.7 Suture Removal: 14 days Perilesional Excision Additional Text (Leave Blank If You Do Not Want): The margin was drawn around the clinically apparent lesion. Incisions were then made along these lines to the appropriate tissue plane and the lesion was extirpated. O-T Plasty Text: The defect edges were debeveled with a #15 scalpel blade.  Given the location of the defect, shape of the defect and the proximity to free margins an O-T plasty was deemed most appropriate.  Using a sterile surgical marker, an appropriate O-T plasty was drawn incorporating the defect and placing the expected incisions within the relaxed skin tension lines where possible.    The area thus outlined was incised deep to adipose tissue with a #15 scalpel blade.  The skin margins were undermined to an appropriate distance in all directions utilizing iris scissors. Complex Repair And Bilobe Flap Text: The defect edges were debeveled with a #15 scalpel blade.  The primary defect was closed partially with a complex linear closure.  Given the location of the remaining defect, shape of the defect and the proximity to free margins a bilobe flap was deemed most appropriate for complete closure of the defect.  Using a sterile surgical marker, an appropriate advancement flap was drawn incorporating the defect and placing the expected incisions within the relaxed skin tension lines where possible.    The area thus outlined was incised deep to adipose tissue with a #15 scalpel blade.  The skin margins were undermined to an appropriate distance in all directions utilizing iris scissors. Interpolation Flap Text: A decision was made to reconstruct the defect utilizing an interpolation axial flap and a staged reconstruction.  A telfa template was made of the defect.  This telfa template was then used to outline the interpolation flap.  The donor area for the pedicle flap was then injected with anesthesia.  The flap was excised through the skin and subcutaneous tissue down to the layer of the underlying musculature.  The interpolation flap was carefully excised within this deep plane to maintain its blood supply.  The edges of the donor site were undermined.   The donor site was closed in a primary fashion.  The pedicle was then rotated into position and sutured.  Once the tube was sutured into place, adequate blood supply was confirmed with blanching and refill.  The pedicle was then wrapped with xeroform gauze and dressed appropriately with a telfa and gauze bandage to ensure continued blood supply and protect the attached pedicle. Skin Substitute Text: The defect edges were debeveled with a #15 scalpel blade.  Given the location of the defect, shape of the defect and the proximity to free margins a skin substitute graft was deemed most appropriate.  The graft material was trimmed to fit the size of the defect. The graft was then placed in the primary defect and oriented appropriately. V-Y Flap Text: The defect edges were debeveled with a #15 scalpel blade.  Given the location of the defect, shape of the defect and the proximity to free margins a V-Y flap was deemed most appropriate.  Using a sterile surgical marker, an appropriate advancement flap was drawn incorporating the defect and placing the expected incisions within the relaxed skin tension lines where possible.    The area thus outlined was incised deep to adipose tissue with a #15 scalpel blade.  The skin margins were undermined to an appropriate distance in all directions utilizing iris scissors. Banner Transposition Flap Text: The defect edges were debeveled with a #15 scalpel blade.  Given the location of the defect and the proximity to free margins a Banner transposition flap was deemed most appropriate.  Using a sterile surgical marker, an appropriate flap drawn around the defect. The area thus outlined was incised deep to adipose tissue with a #15 scalpel blade.  The skin margins were undermined to an appropriate distance in all directions utilizing iris scissors. Complex Repair And Split-Thickness Skin Graft Text: The defect edges were debeveled with a #15 scalpel blade.  The primary defect was closed partially with a complex linear closure.  Given the location of the defect, shape of the defect and the proximity to free margins a split thickness skin graft was deemed most appropriate to repair the remaining defect.  The graft was trimmed to fit the size of the remaining defect.  The graft was then placed in the primary defect, oriented appropriately, and sutured into place. Epidermal Closure: running Mastoid Interpolation Flap Text: A decision was made to reconstruct the defect utilizing an interpolation axial flap and a staged reconstruction.  A telfa template was made of the defect.  This telfa template was then used to outline the mastoid interpolation flap.  The donor area for the pedicle flap was then injected with anesthesia.  The flap was excised through the skin and subcutaneous tissue down to the layer of the underlying musculature.  The pedicle flap was carefully excised within this deep plane to maintain its blood supply.  The edges of the donor site were undermined.   The donor site was closed in a primary fashion.  The pedicle was then rotated into position and sutured.  Once the tube was sutured into place, adequate blood supply was confirmed with blanching and refill.  The pedicle was then wrapped with xeroform gauze and dressed appropriately with a telfa and gauze bandage to ensure continued blood supply and protect the attached pedicle. Hemigard Intro: Due to skin fragility and wound tension, it was decided to use HEMIGARD adhesive retention suture devices to permit a linear closure. The skin was cleaned and dried for a 6cm distance away from the wound. Excessive hair, if present, was removed to allow for adhesion. Consent was obtained from the patient. The risks and benefits to therapy were discussed in detail. Specifically, the risks of infection, scarring, bleeding, prolonged wound healing, incomplete removal, allergy to anesthesia, nerve injury and recurrence were addressed. Prior to the procedure, the treatment site was clearly identified and confirmed by the patient. All components of Universal Protocol/PAUSE Rule completed. Xenograft Text: The defect edges were debeveled with a #15 scalpel blade.  Given the location of the defect, shape of the defect and the proximity to free margins a xenograft was deemed most appropriate.  The graft was then trimmed to fit the size of the defect.  The graft was then placed in the primary defect and oriented appropriately. Anesthesia Volume In Cc: 9 Repair Performed By Another Provider Text (Leave Blank If You Do Not Want): After the tissue was excised the defect was repaired by another provider. Mustarde Flap Text: The defect edges were debeveled with a #15 scalpel blade.  Given the size, depth and location of the defect and the proximity to free margins a Mustarde flap was deemed most appropriate.  Using a sterile surgical marker, an appropriate flap was drawn incorporating the defect. The area thus outlined was incised with a #15 scalpel blade.  The skin margins were undermined to an appropriate distance in all directions utilizing iris scissors. Complex Repair And Dermal Autograft Text: The defect edges were debeveled with a #15 scalpel blade.  The primary defect was closed partially with a complex linear closure.  Given the location of the defect, shape of the defect and the proximity to free margins an dermal autograft was deemed most appropriate to repair the remaining defect.  The graft was trimmed to fit the size of the remaining defect.  The graft was then placed in the primary defect, oriented appropriately, and sutured into place. Where Do You Want The Question To Include Opioid Counseling Located?: Case Summary Tab Debridement Text: The wound edges were debrided prior to proceeding with the closure to facilitate wound healing. Excision Depth: adipose tissue Complex Repair And Rotation Flap Text: The defect edges were debeveled with a #15 scalpel blade.  The primary defect was closed partially with a complex linear closure.  Given the location of the remaining defect, shape of the defect and the proximity to free margins a rotation flap was deemed most appropriate for complete closure of the defect.  Using a sterile surgical marker, an appropriate advancement flap was drawn incorporating the defect and placing the expected incisions within the relaxed skin tension lines where possible.    The area thus outlined was incised deep to adipose tissue with a #15 scalpel blade.  The skin margins were undermined to an appropriate distance in all directions utilizing iris scissors. Adjacent Tissue Transfer Text: The defect edges were debeveled with a #15 scalpel blade.  Given the location of the defect and the proximity to free margins an adjacent tissue transfer was deemed most appropriate.  Using a sterile surgical marker, an appropriate flap was drawn incorporating the defect and placing the expected incisions within the relaxed skin tension lines where possible.    The area thus outlined was incised deep to adipose tissue with a #15 scalpel blade.  The skin margins were undermined to an appropriate distance in all directions utilizing iris scissors. Nasalis-Muscle-Based Myocutaneous Island Pedicle Flap Text: Using a #15 blade, an incision was made around the donor flap to the level of the nasalis muscle. Wide lateral undermining was then performed in both the subcutaneous plane above the nasalis muscle, and in a submuscular plane just above periosteum. This allowed the formation of a free nasalis muscle axial pedicle (based on the angular artery) which was still attached to the actual cutaneous flap, increasing its mobility and vascular viability. Hemostasis was obtained with pinpoint electrocoagulation. The flap was mobilized into position and the pivotal anchor points positioned and stabilized with buried interrupted sutures. Subcutaneous and dermal tissues were closed in a multilayered fashion with sutures. Tissue redundancies were excised, and the epidermal edges were apposed without significant tension and sutured with sutures. Information: Selecting Yes will display possible errors in your note based on the variables you have selected. This validation is only offered as a suggestion for you. PLEASE NOTE THAT THE VALIDATION TEXT WILL BE REMOVED WHEN YOU FINALIZE YOUR NOTE. IF YOU WANT TO FAX A PRELIMINARY NOTE YOU WILL NEED TO TOGGLE THIS TO 'NO' IF YOU DO NOT WANT IT IN YOUR FAXED NOTE. Paramedian Forehead Flap Text: A decision was made to reconstruct the defect utilizing an interpolation axial flap and a staged reconstruction.  A telfa template was made of the defect.  This telfa template was then used to outline the paramedian forehead pedicle flap.  The donor area for the pedicle flap was then injected with anesthesia.  The flap was excised through the skin and subcutaneous tissue down to the layer of the underlying musculature.  The pedicle flap was carefully excised within this deep plane to maintain its blood supply.  The edges of the donor site were undermined.   The donor site was closed in a primary fashion.  The pedicle was then rotated into position and sutured.  Once the tube was sutured into place, adequate blood supply was confirmed with blanching and refill.  The pedicle was then wrapped with xeroform gauze and dressed appropriately with a telfa and gauze bandage to ensure continued blood supply and protect the attached pedicle. Mercedes Flap Text: The defect edges were debeveled with a #15 scalpel blade.  Given the location of the defect, shape of the defect and the proximity to free margins a Mercedes flap was deemed most appropriate.  Using a sterile surgical marker, an appropriate advancement flap was drawn incorporating the defect and placing the expected incisions within the relaxed skin tension lines where possible. The area thus outlined was incised deep to adipose tissue with a #15 scalpel blade.  The skin margins were undermined to an appropriate distance in all directions utilizing iris scissors. Bilobed Transposition Flap Text: The defect edges were debeveled with a #15 scalpel blade.  Given the location of the defect and the proximity to free margins a bilobed transposition flap was deemed most appropriate.  Using a sterile surgical marker, an appropriate bilobe flap drawn around the defect.    The area thus outlined was incised deep to adipose tissue with a #15 scalpel blade.  The skin margins were undermined to an appropriate distance in all directions utilizing iris scissors.

## 2023-12-12 NOTE — BH SAFETY PLAN - DISTRACTION PLACE 1
Mariam's diane
Continue with Atrovent and Proventil q6hrs prn for SOB/Wheezing  NC at 2L, continuous pulse oxygen  Consider Pulmonary consult in am if warranted

## 2023-12-12 NOTE — BH SAFETY PLAN - SUICIDE PREVENTION LIFELINE PHONES
Suicide Prevention Lifeline Phone: [phone] ([phone] Suicide Prevention Lifeline Phone: [phone] TALK ([phone])

## 2023-12-20 NOTE — BH SOCIAL WORK CONFIRMATION FOLLOW UP NOTE - NSLINKEDTOLOC_PSY_ALL_CORE
Barbara Neville (:  1945) is a 70 y.o. female,Established patient, here for evaluation of the following chief complaint(s):  Skin Lesion         ASSESSMENT/PLAN:  1. Skin lesion of left lower limb  -     Surgical Pathology  -await ID, sutures out in 10 days, watch signs of infection. 2. Morbid obesity with BMI of 45.0-49.9, adult ([address]   -Encouraged diet, exercise and weight loss. 3. Verruca vulgaris--no malignancy. No follow-ups on file. Subjective   SUBJECTIVE/OBJECTIVE:  HPI   Pt seen today due to raised lesion of left lower limb, here for removal and path ID. Reviewed BMI of 47. Encouraged diet, exercise and weight loss. Signed abn and consent forms. Denies any current problems, is feeling well. , non smoker, pmh reviewed. Review of Systems   Constitutional: Negative for chills, fatigue, fever and unexpected weight change. HENT: Negative for congestion, ear pain, rhinorrhea and sore throat. Eyes: Negative for pain and visual disturbance. Respiratory: Negative for cough, chest tightness, shortness of breath and wheezing. Cardiovascular: Negative for chest pain and palpitations. Gastrointestinal: Negative for abdominal pain, blood in stool, constipation, diarrhea, nausea and vomiting. Genitourinary: Negative for difficulty urinating, frequency, hematuria and urgency. Musculoskeletal: Negative for back pain, joint swelling, myalgias and neck pain. Skin: Negative for rash. Neurological: Negative for dizziness and headaches. Hematological: Negative for adenopathy. Does not bruise/bleed easily. Psychiatric/Behavioral: Negative for behavioral problems and sleep disturbance. The patient is not nervous/anxious. Objective   Physical Exam  Vitals and nursing note reviewed. Constitutional:       Appearance: She is well-developed. HENT:      Head: Normocephalic and atraumatic.       Right Ear: External ear normal.      Left Ear: External ear normal. Nose: Nose normal.      Mouth/Throat:      Mouth: Mucous membranes are moist.   Eyes:      Pupils: Pupils are equal, round, and reactive to light. Neck:      Thyroid: No thyromegaly. Cardiovascular:      Rate and Rhythm: Normal rate and regular rhythm. Heart sounds: Normal heart sounds. Pulmonary:      Breath sounds: Normal breath sounds. No wheezing or rales. Abdominal:      General: Bowel sounds are normal.      Palpations: Abdomen is soft. Tenderness: There is no abdominal tenderness. There is no guarding or rebound. Musculoskeletal:         General: Normal range of motion. Cervical back: Neck supple. Lymphadenopathy:      Cervical: No cervical adenopathy. Skin:     General: Skin is warm and dry. Findings: No rash. Neurological:      Mental Status: She is alert and oriented to person, place, and time. Cranial Nerves: No cranial nerve deficit. Deep Tendon Reflexes: Reflexes are normal and symmetric. An electronic signature was used to authenticate this note.     --Rasheed Ornelas MD [facility]

## 2024-02-10 ENCOUNTER — INPATIENT (INPATIENT)
Facility: HOSPITAL | Age: 29
LOS: 19 days | Discharge: HOME | End: 2024-03-01
Attending: STUDENT IN AN ORGANIZED HEALTH CARE EDUCATION/TRAINING PROGRAM | Admitting: STUDENT IN AN ORGANIZED HEALTH CARE EDUCATION/TRAINING PROGRAM
Payer: MEDICAID

## 2024-02-10 VITALS
TEMPERATURE: 98 F | SYSTOLIC BLOOD PRESSURE: 111 MMHG | HEART RATE: 67 BPM | HEIGHT: 68 IN | DIASTOLIC BLOOD PRESSURE: 74 MMHG | OXYGEN SATURATION: 97 % | RESPIRATION RATE: 16 BRPM

## 2024-02-10 DIAGNOSIS — R46.89 OTHER SYMPTOMS AND SIGNS INVOLVING APPEARANCE AND BEHAVIOR: ICD-10-CM

## 2024-02-10 LAB
ALBUMIN SERPL ELPH-MCNC: 4.1 G/DL — SIGNIFICANT CHANGE UP (ref 3.3–5)
ALP SERPL-CCNC: 56 U/L — SIGNIFICANT CHANGE UP (ref 40–120)
ALT FLD-CCNC: 11 U/L — SIGNIFICANT CHANGE UP (ref 4–41)
AMPHET UR-MCNC: NEGATIVE — SIGNIFICANT CHANGE UP
ANION GAP SERPL CALC-SCNC: 10 MMOL/L — SIGNIFICANT CHANGE UP (ref 7–14)
APAP SERPL-MCNC: <10 UG/ML — LOW (ref 15–25)
APPEARANCE UR: CLEAR — SIGNIFICANT CHANGE UP
AST SERPL-CCNC: 16 U/L — SIGNIFICANT CHANGE UP (ref 4–40)
BARBITURATES UR SCN-MCNC: NEGATIVE — SIGNIFICANT CHANGE UP
BASOPHILS # BLD AUTO: 0.02 K/UL — SIGNIFICANT CHANGE UP (ref 0–0.2)
BASOPHILS NFR BLD AUTO: 0.5 % — SIGNIFICANT CHANGE UP (ref 0–2)
BENZODIAZ UR-MCNC: NEGATIVE — SIGNIFICANT CHANGE UP
BILIRUB SERPL-MCNC: 0.4 MG/DL — SIGNIFICANT CHANGE UP (ref 0.2–1.2)
BILIRUB UR-MCNC: NEGATIVE — SIGNIFICANT CHANGE UP
BUN SERPL-MCNC: 11 MG/DL — SIGNIFICANT CHANGE UP (ref 7–23)
CALCIUM SERPL-MCNC: 9 MG/DL — SIGNIFICANT CHANGE UP (ref 8.4–10.5)
CHLORIDE SERPL-SCNC: 107 MMOL/L — SIGNIFICANT CHANGE UP (ref 98–107)
CO2 SERPL-SCNC: 23 MMOL/L — SIGNIFICANT CHANGE UP (ref 22–31)
COCAINE METAB.OTHER UR-MCNC: NEGATIVE — SIGNIFICANT CHANGE UP
COLOR SPEC: YELLOW — SIGNIFICANT CHANGE UP
CREAT SERPL-MCNC: 1.02 MG/DL — SIGNIFICANT CHANGE UP (ref 0.5–1.3)
CREATININE URINE RESULT, DAU: 294 MG/DL — SIGNIFICANT CHANGE UP
DIFF PNL FLD: NEGATIVE — SIGNIFICANT CHANGE UP
EGFR: 104 ML/MIN/1.73M2 — SIGNIFICANT CHANGE UP
EOSINOPHIL # BLD AUTO: 0.15 K/UL — SIGNIFICANT CHANGE UP (ref 0–0.5)
EOSINOPHIL NFR BLD AUTO: 3.7 % — SIGNIFICANT CHANGE UP (ref 0–6)
ETHANOL SERPL-MCNC: <10 MG/DL — SIGNIFICANT CHANGE UP
GLUCOSE SERPL-MCNC: 84 MG/DL — SIGNIFICANT CHANGE UP (ref 70–99)
GLUCOSE UR QL: NEGATIVE MG/DL — SIGNIFICANT CHANGE UP
HCT VFR BLD CALC: 36.9 % — LOW (ref 39–50)
HGB BLD-MCNC: 12.3 G/DL — LOW (ref 13–17)
IANC: 1.73 K/UL — LOW (ref 1.8–7.4)
IMM GRANULOCYTES NFR BLD AUTO: 0.2 % — SIGNIFICANT CHANGE UP (ref 0–0.9)
KETONES UR-MCNC: 15 MG/DL
LEUKOCYTE ESTERASE UR-ACNC: NEGATIVE — SIGNIFICANT CHANGE UP
LYMPHOCYTES # BLD AUTO: 1.82 K/UL — SIGNIFICANT CHANGE UP (ref 1–3.3)
LYMPHOCYTES # BLD AUTO: 44.7 % — HIGH (ref 13–44)
MCHC RBC-ENTMCNC: 27.5 PG — SIGNIFICANT CHANGE UP (ref 27–34)
MCHC RBC-ENTMCNC: 33.3 GM/DL — SIGNIFICANT CHANGE UP (ref 32–36)
MCV RBC AUTO: 82.4 FL — SIGNIFICANT CHANGE UP (ref 80–100)
METHADONE UR-MCNC: NEGATIVE — SIGNIFICANT CHANGE UP
MONOCYTES # BLD AUTO: 0.34 K/UL — SIGNIFICANT CHANGE UP (ref 0–0.9)
MONOCYTES NFR BLD AUTO: 8.4 % — SIGNIFICANT CHANGE UP (ref 2–14)
NEUTROPHILS # BLD AUTO: 1.73 K/UL — LOW (ref 1.8–7.4)
NEUTROPHILS NFR BLD AUTO: 42.5 % — LOW (ref 43–77)
NITRITE UR-MCNC: NEGATIVE — SIGNIFICANT CHANGE UP
NRBC # BLD: 0 /100 WBCS — SIGNIFICANT CHANGE UP (ref 0–0)
NRBC # FLD: 0 K/UL — SIGNIFICANT CHANGE UP (ref 0–0)
OPIATES UR-MCNC: NEGATIVE — SIGNIFICANT CHANGE UP
OXYCODONE UR-MCNC: NEGATIVE — SIGNIFICANT CHANGE UP
PCP SPEC-MCNC: SIGNIFICANT CHANGE UP
PCP UR-MCNC: NEGATIVE — SIGNIFICANT CHANGE UP
PH UR: 6 — SIGNIFICANT CHANGE UP (ref 5–8)
PLATELET # BLD AUTO: 214 K/UL — SIGNIFICANT CHANGE UP (ref 150–400)
POTASSIUM SERPL-MCNC: 3.5 MMOL/L — SIGNIFICANT CHANGE UP (ref 3.5–5.3)
POTASSIUM SERPL-SCNC: 3.5 MMOL/L — SIGNIFICANT CHANGE UP (ref 3.5–5.3)
PROT SERPL-MCNC: 6.8 G/DL — SIGNIFICANT CHANGE UP (ref 6–8.3)
PROT UR-MCNC: NEGATIVE MG/DL — SIGNIFICANT CHANGE UP
RBC # BLD: 4.48 M/UL — SIGNIFICANT CHANGE UP (ref 4.2–5.8)
RBC # FLD: 13.2 % — SIGNIFICANT CHANGE UP (ref 10.3–14.5)
SALICYLATES SERPL-MCNC: <0.3 MG/DL — LOW (ref 15–30)
SARS-COV-2 RNA SPEC QL NAA+PROBE: SIGNIFICANT CHANGE UP
SODIUM SERPL-SCNC: 140 MMOL/L — SIGNIFICANT CHANGE UP (ref 135–145)
SP GR SPEC: 1.03 — SIGNIFICANT CHANGE UP (ref 1–1.03)
THC UR QL: POSITIVE
TOXICOLOGY SCREEN, DRUGS OF ABUSE, SERUM RESULT: SIGNIFICANT CHANGE UP
TSH SERPL-MCNC: 0.87 UIU/ML — SIGNIFICANT CHANGE UP (ref 0.27–4.2)
UROBILINOGEN FLD QL: 1 MG/DL — SIGNIFICANT CHANGE UP (ref 0.2–1)
WBC # BLD: 4.07 K/UL — SIGNIFICANT CHANGE UP (ref 3.8–10.5)
WBC # FLD AUTO: 4.07 K/UL — SIGNIFICANT CHANGE UP (ref 3.8–10.5)

## 2024-02-10 PROCEDURE — 99285 EMERGENCY DEPT VISIT HI MDM: CPT

## 2024-02-10 PROCEDURE — 99285 EMERGENCY DEPT VISIT HI MDM: CPT | Mod: GC

## 2024-02-10 RX ORDER — OLANZAPINE 15 MG/1
10 TABLET, FILM COATED ORAL ONCE
Refills: 0 | Status: COMPLETED | OUTPATIENT
Start: 2024-02-10 | End: 2024-02-10

## 2024-02-10 RX ORDER — HALOPERIDOL DECANOATE 100 MG/ML
5 INJECTION INTRAMUSCULAR EVERY 6 HOURS
Refills: 0 | Status: DISCONTINUED | OUTPATIENT
Start: 2024-02-10 | End: 2024-03-01

## 2024-02-10 RX ORDER — RISPERIDONE 4 MG/1
2 TABLET ORAL DAILY
Refills: 0 | Status: DISCONTINUED | OUTPATIENT
Start: 2024-02-10 | End: 2024-02-11

## 2024-02-10 RX ORDER — DIPHENHYDRAMINE HCL 50 MG
50 CAPSULE ORAL EVERY 6 HOURS
Refills: 0 | Status: DISCONTINUED | OUTPATIENT
Start: 2024-02-10 | End: 2024-03-01

## 2024-02-10 RX ORDER — HALOPERIDOL DECANOATE 100 MG/ML
5 INJECTION INTRAMUSCULAR ONCE
Refills: 0 | Status: DISCONTINUED | OUTPATIENT
Start: 2024-02-10 | End: 2024-03-01

## 2024-02-10 RX ORDER — DIPHENHYDRAMINE HCL 50 MG
50 CAPSULE ORAL ONCE
Refills: 0 | Status: DISCONTINUED | OUTPATIENT
Start: 2024-02-10 | End: 2024-03-01

## 2024-02-10 RX ADMIN — OLANZAPINE 10 MILLIGRAM(S): 15 TABLET, FILM COATED ORAL at 11:30

## 2024-02-10 NOTE — ED ADULT NURSE REASSESSMENT NOTE - NS ED NURSE REASSESS COMMENT FT1
Pt irritable & agitated medicated as per Dr Nuñez rx pt unable to be verbally deescalated eval on going.

## 2024-02-10 NOTE — ED BEHAVIORAL HEALTH NOTE - BEHAVIORAL HEALTH NOTE
IRVING received call from MD requesting collateral for pt. Writer contacted pt's father, Jerry, at [phone]. Writer received VM with message left and a return call requested.

## 2024-02-10 NOTE — ED BEHAVIORAL HEALTH ASSESSMENT NOTE - OTHER
father Boarding, no beds available at this time [facility], thinking he is going to get [employer] pending bed placement

## 2024-02-10 NOTE — ED BEHAVIORAL HEALTH ASSESSMENT NOTE - CURRENT MEDICATION
None. Received [facility] IM last in June 2023. None. Received [facility] IM last in December 2023.
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
## 2024-02-10 NOTE — ED ADULT NURSE NOTE - NSFALLUNIVINTERV_ED_ALL_ED
Bed/Stretcher in lowest position, wheels locked, appropriate side rails in place/Non-slip footwear applied when patient is off stretcher/Physically safe environment - no spills, clutter or unnecessary equipment/Purposeful proactive rounding/Room/bathroom lighting operational, light cord in reach

## 2024-02-10 NOTE — ED ADULT NURSE REASSESSMENT NOTE - NS ED NURSE REASSESS COMMENT FT1
Received report from night RN pt lying on bed in nad eyes close breathing even & unlabored eval on going.
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
## 2024-02-10 NOTE — ED BEHAVIORAL HEALTH ASSESSMENT NOTE - OTHER PAST PSYCHIATRIC HISTORY (INCLUDE DETAILS REGARDING ONSET, COURSE OF ILLNESS, INPATIENT/OUTPATIENT TREATMENT)
PPHx of various diagnoses listed on [facility]yckes Bipolar 1, Schizoaffective disorder, Unspecified Psychosis, Conduct Disorder, cannabis abuse, r/o substance-induced psychosis, multiple past psych hospitalizations, most recently at [facility] 2/28-3/11 2020, chronic history of medication noncompliance, was on [place] after [place] ([place] [facility]), no history of self-injurious behavior or suicide attempts. Chronic non compliance. PPHx of various diagnoses listed on Psyckes Bipolar 1, Schizoaffective disorder, Unspecified Psychosis, Conduct Disorder, cannabis abuse, r/o substance-induced psychosis, multiple past psych hospitalizations, most recently at [facility] 2/28-3/11 2020, most recently [facility] 12/11/23 and [place] 11/29/23; chronic history of medication noncompliance, was on [place] last 12/2023, no history of self-injurious behavior or suicide attempts. Chronic non compliance.

## 2024-02-10 NOTE — ED BEHAVIORAL HEALTH ASSESSMENT NOTE - DETAILS
father Denies active/passive suicidal thoughts, denies history of suicide attempts. Per protocol provided father with information about transfer to [facility] spoke to LILLY

## 2024-02-10 NOTE — BH PATIENT PROFILE - HOME MEDICATIONS
Derrick Sustenna 156 mg/mL intramuscular suspension, extended release , 156 milligram(s) intramuscularly every 28 days next due 1/9/23  Depakote  mg oral tablet, extended release , 3 tab(s) orally once a day (at bedtime) total 1500mg at night

## 2024-02-10 NOTE — ED ADULT TRIAGE NOTE - CHIEF COMPLAINT QUOTE
Past medical history of bipolar disorder, family activated EMS due to increased agitation and non-compliance with medication.

## 2024-02-10 NOTE — ED PROVIDER NOTE - PROGRESS NOTE DETAILS
Discussed case with psychiatry, patient is to be admitted to inpatient psych at [facility], [place].  Informed patient, patient became verbally agitated and verbal de-escalation attempts were attempted.  Had some physically aggressive posturing, security was called.  At the patient's request informed mother of patient's current situation regarding admission to inpatient [facility] or [facility], [place].  Facilitated mother conversation with the patient.  Patient required calming medications for patient and staff safety.    (Junior Helton MD; attending emergency medicine and medical toxicology)
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
## 2024-02-11 DIAGNOSIS — F12.90 CANNABIS USE, UNSPECIFIED, UNCOMPLICATED: ICD-10-CM

## 2024-02-11 PROCEDURE — 99222 1ST HOSP IP/OBS MODERATE 55: CPT

## 2024-02-11 RX ORDER — RISPERIDONE 4 MG/1
1 TABLET ORAL
Refills: 0 | Status: DISCONTINUED | OUTPATIENT
Start: 2024-02-11 | End: 2024-02-14

## 2024-02-11 RX ORDER — DIPHENHYDRAMINE HCL 50 MG
50 CAPSULE ORAL ONCE
Refills: 0 | Status: COMPLETED | OUTPATIENT
Start: 2024-02-11 | End: 2024-02-11

## 2024-02-11 RX ORDER — HALOPERIDOL DECANOATE 100 MG/ML
5 INJECTION INTRAMUSCULAR ONCE
Refills: 0 | Status: COMPLETED | OUTPATIENT
Start: 2024-02-11 | End: 2024-02-11

## 2024-02-11 RX ADMIN — HALOPERIDOL DECANOATE 5 MILLIGRAM(S): 100 INJECTION INTRAMUSCULAR at 15:06

## 2024-02-11 RX ADMIN — Medication 50 MILLIGRAM(S): at 15:06

## 2024-02-11 RX ADMIN — Medication 2 MILLIGRAM(S): at 15:06

## 2024-02-11 NOTE — BH INPATIENT PSYCHIATRY ASSESSMENT NOTE - NSBHMETABOLIC_PSY_ALL_CORE_FT
BMI: BMI (kg/m2): 27.8 (02-10-24 @ 05:52)  HbA1c:   Glucose:   BP: 124/70 (02-10-24 @ 12:16) (111/74 - 134/90)Vital Signs Last 24 Hrs  T(C): 36.9 (02-10-24 @ 12:16), Max: 36.9 (02-10-24 @ 12:16)  T(F): 98.4 (02-10-24 @ 12:16), Max: 98.4 (02-10-24 @ 12:16)  HR: 60 (02-10-24 @ 12:16) (60 - 60)  BP: 124/70 (02-10-24 @ 12:16) (124/70 - 124/70)  BP(mean): --  RR: 16 (02-10-24 @ 13:50) (16 - 17)  SpO2: 100% (02-10-24 @ 13:50) (99% - 100%)    Orthostatic VS  02-10-24 @ 13:50  Lying BP: 110/68 HR: 60  Sitting BP: --/-- HR: --  Standing BP: --/-- HR: --  Site: --  Mode: --    Lipid Panel:

## 2024-02-11 NOTE — BH CHART NOTE - NSEVENTNOTEFT_PSY_ALL_CORE
LILLY called due to patient agitation. Per staff patient has been agitated, threatening staff, threatening peers, refusing PO prn medications. IM Haldol 5 mg IM x 1, Ativan 2 mg IM x 1, Benadryl 50 mg IM x 1 given for threat to self and others. Pt required manual hold from 15:05-15:06 for safe administration of IM medications. After administration of IM medications, pt continued threatening to assault staff and posturing towards staff. Pt placed in seclusion starting at 15:10, due to threat to others. On exam, patient was not in acute distress. Patient will remain in seclusion for the shortest amount of time possible until pt is no longer agitated and able to be redirected. Case d/w nurse, who is in agreement.

## 2024-02-11 NOTE — BH INPATIENT PSYCHIATRY ASSESSMENT NOTE - NSBHCHARTREVIEWVS_PSY_A_CORE FT
Vital Signs Last 24 Hrs  T(C): 36.9 (02-10-24 @ 12:16), Max: 36.9 (02-10-24 @ 12:16)  T(F): 98.4 (02-10-24 @ 12:16), Max: 98.4 (02-10-24 @ 12:16)  HR: 60 (02-10-24 @ 12:16) (60 - 60)  BP: 124/70 (02-10-24 @ 12:16) (124/70 - 124/70)  BP(mean): --  RR: 16 (02-10-24 @ 13:50) (16 - 17)  SpO2: 100% (02-10-24 @ 13:50) (99% - 100%)    Orthostatic VS  02-10-24 @ 13:50  Lying BP: 110/68 HR: 60  Sitting BP: --/-- HR: --  Standing BP: --/-- HR: --  Site: --  Mode: --

## 2024-02-11 NOTE — BH INPATIENT PSYCHIATRY ASSESSMENT NOTE - CURRENT MEDICATION
MEDICATIONS  (STANDING):  risperiDONE   Tablet 2 milliGRAM(s) Oral daily    MEDICATIONS  (PRN):  diphenhydrAMINE 50 milliGRAM(s) Oral every 6 hours PRN agitation/eps prophylaxis  diphenhydrAMINE Injectable 50 milliGRAM(s) IntraMuscular once PRN severe agitation/eps prophylaxis  diphenhydrAMINE Injectable 50 milliGRAM(s) IntraMuscular once PRN severe agitation/eps prophylaxis  haloperidol     Tablet 5 milliGRAM(s) Oral every 6 hours PRN agitation  haloperidol    Injectable 5 milliGRAM(s) IntraMuscular once PRN severe agitation  haloperidol    Injectable 5 milliGRAM(s) IntraMuscular once PRN severe agitation  LORazepam     Tablet 2 milliGRAM(s) Oral every 6 hours PRN Agitation  LORazepam   Injectable 2 milliGRAM(s) IntraMuscular Once PRN severe agitation  LORazepam   Injectable 2 milliGRAM(s) IntraMuscular Once PRN severe agitation

## 2024-02-11 NOTE — BH INPATIENT PSYCHIATRY ASSESSMENT NOTE - NSBHATTESTATTENDBILLTIME_PSY_A_CORE
I attest my time as attending is greater than JORGE L time spent on qualifying patient care activities.

## 2024-02-11 NOTE — BH INPATIENT PSYCHIATRY ASSESSMENT NOTE - ATTENDING COMMENTS
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, non-caregiver, with PPHx of Schizoaffective disorder bipolar type, and cannabis abuse, multiple past psych hospitalizations, most recently [facility] 12/11/23 and [place] 11/29/23, previous on AOT (stopped June 2023), chronic history of medication noncompliance, was on Invega Sustenna until June 2023; discharged from [facility] on Invega Sustenna 234mg and 156mg on 12/6/23 and 12/12/23, no history of self-injurious behavior or suicide attempts, documented history of property destruction & aggression/violence, history of probation for reckless driving approximately 5-6 years ago and has history of arrests for stealing and reynoso larceny, h/o daily marijuana use, brought in by EMS, activated by father, agitation and altercation at home in the context of treatment noncompliance, brought in to ED in handcuffs.  On exam, patient noted irritable, his thought process is disorganized, thought content is at times illogical. Continues to exhibit elated and elevated mood. Inpatient level of care is appropriate given level of disorganization and recent physical aggression towards family.

## 2024-02-11 NOTE — BH INPATIENT PSYCHIATRY ASSESSMENT NOTE - NSBHASSESSSUMMFT_PSY_ALL_CORE
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, non-caregiver, with PPHx of Schizoaffective disorder bipolar type, and cannabis abuse, multiple past psych hospitalizations, most recently [facility] 12/11/23 and [place] 11/29/23, previous on AOT (stopped June 2023), chronic history of medication noncompliance, was on Invega Sustenna until June 2023; discharged from [facility] on Invega Sustenna 234mg and 156mg on 12/6/23 and 12/12/23, no history of self-injurious behavior or suicide attempts, documented history of property destruction & aggression/violence, history of probation for reckless driving approximately 5-6 years ago and has history of arrests for stealing and reynoso larceny, h/o daily marijuana use, brought in by EMS, activated by father, agitation and altercation at home in the context of treatment noncompliance, brought in to ED in handcuffs.  On exam, patient noted irritable, his thought process is disorganized, thought content is at times illogical. Patient noted to become slightly agitated during assessment, denying all events leading to coming to the ED. Per father, pt has been agitated and aggressive at home, has been med nonadherent, needed handcuffed when police came today due to aggression, advocating for his admission and for a future AOT order.  Patient with significant history of violence, and currently not in treatment. Patient is at elevated risk for harm to others due to worsening manic symptoms and poor impulse control. Patient will be admitted involuntarily on a 9.27.    On the unit, patient remains psychotic and in tenuous control. Patient has been refusing the Risperdal.    1. Admit to [facility], 9.27  2. No CO needed  3. Start Risperdal 1mg BID, hx of Invega Sustenna  4. Labs ordered for am  5. Dispo planning per SW pending clinical improvement

## 2024-02-11 NOTE — BH INPATIENT PSYCHIATRY ASSESSMENT NOTE - OTHER PAST PSYCHIATRIC HISTORY (INCLUDE DETAILS REGARDING ONSET, COURSE OF ILLNESS, INPATIENT/OUTPATIENT TREATMENT)
PPHx of various diagnoses listed on Psyckes Bipolar 1, Schizoaffective disorder, Unspecified Psychosis, Conduct Disorder, cannabis abuse, r/o substance-induced psychosis, multiple past psych hospitalizations, most recently at [facility] 2/28-3/11 2020, most recently [facility] 12/11/23 and [place] 11/29/23; chronic history of medication noncompliance, was on [place] last 12/2023, no history of self-injurious behavior or suicide attempts. Chronic non compliance.

## 2024-02-11 NOTE — BH INPATIENT PSYCHIATRY ASSESSMENT NOTE - HPI (INCLUDE ILLNESS QUALITY, SEVERITY, DURATION, TIMING, CONTEXT, MODIFYING FACTORS, ASSOCIATED SIGNS AND SYMPTOMS)
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, non-caregiver, with PPHx of Schizoaffective disorder bipolar type, and cannabis abuse, multiple past psych hospitalizations, most recently [facility] 12/11/23 and [place] 11/29/23, previous on AOT (stopped June 2023), chronic history of medication noncompliance, was on Invega Sustenna until June 2023; discharged from [facility] on Invega Sustenna 234mg and 156mg on 12/6/23 and 12/12/23, no history of self-injurious behavior or suicide attempts, documented history of property destruction & aggression/violence, history of probation for reckless driving approximately 5-6 years ago and has history of arrests for stealing and reynoso larceny, h/o daily marijuana use, brought in by EMS, activated by father, agitation and altercation at home in the context of treatment noncompliance, brought in to ED in handcuffs.    On exam, patient superficially cooperative and slightly irritable, some noticeable grandiosity throughout interview, thought content is at times illogical. Pt states that he does not know why he is here today, stating that he woke up at 4am this morning, wanted to find his headphones, used Cannibis, asked his father if he knew where the headphones are at, went outside to try to find them, and then came back to his room and the police came in the room. Pt denies any altercation. Cannot provide history about why he was handcuffed when brought to the ED. Pt states that police and mental health workers are a "conspiracy" and says that he is going to write a book and get a [employer] for his work. Pt does not think he needs any medications currently, denies mental health conditions. Patient denies active/passive suicidal/homicidal ideation, plan or intent. He denies AH/VH, but at times seemed internally preoccupied. Pt says that he has been sleeping from 8pm-2/3am recently, endorses higher energy. Pt denies depressed mood, says his mood is "amazing." Denies any legal issues. Endorses using marijuana daily but denies all other substance use. Pt stands up, starts to walk out of room, says that he is doing "great" and to "call his father" so that he can be discharged.     Spoke to father, Jerry at [phone]. States that for the last month, pt's behavior has been erratic, aggressive, and disrespectful. Says that pt has been not adherent with his medications since his discharge from [facility]. Last night, he woke up and was raising his voice, agitated, was worried that he may escalate and become physically aggressive. He was not cooperative with police officers when they came, was aggressive, and was handcuffed due to this. Requesting that he is psychiatrically hospitalized and asking for him to have AOT again as he functioned well on AOT."    On the unit, patient is minimally cooperative and in tenuous behavioral control. Patient required emergent IM medications last night. Patient denies any reason for admission and has been refusing medication. Patient appears paranoid, labile and guarded. Denies any AVH or SI/HI. No CO needed at this time. Risperdal changed to 1mg BID and patient encouraged to comply.

## 2024-02-11 NOTE — PSYCHIATRIC REHAB INITIAL EVALUATION - NSBHEMPLOYED_PSY_ALL_CORE
Per chart, patient is intermittently employed in air conditioner installations; patient is not currently working

## 2024-02-11 NOTE — ED BEHAVIORAL HEALTH NOTE - BEHAVIORAL HEALTH NOTE
Writer contacted [employer] at [phone] for precert. Writer spoke with Nissa ZAMORA of [employer] who provided pre auth #[id number] good until next business day (2/12) at 5pm. [employer] to outreach UR contact by end of day.

## 2024-02-11 NOTE — PSYCHIATRIC REHAB INITIAL EVALUATION - NSBHPRRECOMMEND_PSY_ALL_CORE
Writer attempted to meet with patient in order to orient patient to unit, provide patient with a unit schedule, and introduce patient to psychiatric rehabilitation staff and department functions. Per unit staff, writer was told not to meet with patient as this time due to severity of symptoms. Therefore, writer was unable to conduct a complete individual assessment. Patient was admitted to [facility] due to agitation at home in the context of medication non-adherence. Writer and patient were unable to establish a collaborative psychiatric rehabilitation goal, therefore one will be chosen for him. In response to the COVID-19 outbreak, there has been a shift in hospital wide policies and protocols. As a result, it should be noted that unit programming will be re-evaluated on a consistent basis in effort to maintain safety guidelines. Psychiatric Rehabilitation staff will continue to engage patient daily in order to develop therapeutic rapport.

## 2024-02-11 NOTE — BH INPATIENT PSYCHIATRY ASSESSMENT NOTE - DESCRIPTION
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

## 2024-02-12 PROCEDURE — 99232 SBSQ HOSP IP/OBS MODERATE 35: CPT

## 2024-02-12 RX ORDER — DIPHENHYDRAMINE HCL 50 MG
50 CAPSULE ORAL ONCE
Refills: 0 | Status: COMPLETED | OUTPATIENT
Start: 2024-02-12 | End: 2024-02-12

## 2024-02-12 RX ORDER — HALOPERIDOL DECANOATE 100 MG/ML
5 INJECTION INTRAMUSCULAR ONCE
Refills: 0 | Status: COMPLETED | OUTPATIENT
Start: 2024-02-12 | End: 2024-02-12

## 2024-02-12 RX ORDER — TRAZODONE HCL 50 MG
50 TABLET ORAL AT BEDTIME
Refills: 0 | Status: DISCONTINUED | OUTPATIENT
Start: 2024-02-12 | End: 2024-03-01

## 2024-02-12 RX ADMIN — Medication 50 MILLIGRAM(S): at 21:55

## 2024-02-12 RX ADMIN — Medication 50 MILLIGRAM(S): at 20:33

## 2024-02-12 RX ADMIN — HALOPERIDOL DECANOATE 5 MILLIGRAM(S): 100 INJECTION INTRAMUSCULAR at 21:55

## 2024-02-12 RX ADMIN — RISPERIDONE 1 MILLIGRAM(S): 4 TABLET ORAL at 20:33

## 2024-02-12 RX ADMIN — HALOPERIDOL DECANOATE 5 MILLIGRAM(S): 100 INJECTION INTRAMUSCULAR at 20:32

## 2024-02-12 RX ADMIN — Medication 2 MILLIGRAM(S): at 20:32

## 2024-02-12 NOTE — BH INPATIENT PSYCHIATRY PROGRESS NOTE - NSBHMETABOLIC_PSY_ALL_CORE_FT
BMI: BMI (kg/m2): 27.8 (02-10-24 @ 05:52)  HbA1c:   Glucose:   BP: 124/70 (02-10-24 @ 12:16) (111/74 - 134/90)Vital Signs Last 24 Hrs  T(C): --  T(F): --  HR: --  BP: --  BP(mean): --  RR: 18 (02-12-24 @ 11:24) (18 - 18)  SpO2: --      Lipid Panel:  BMI: BMI (kg/m2): 27.8 (02-10-24 @ 05:52)  HbA1c:   Glucose:   BP: 124/70 (02-10-24 @ 12:16) (124/70 - 124/70)Vital Signs Last 24 Hrs  T(C): 36.3 (02-13-24 @ 08:31), Max: 36.3 (02-13-24 @ 08:31)  T(F): 97.3 (02-13-24 @ 08:31), Max: 97.3 (02-13-24 @ 08:31)  HR: --  BP: --  BP(mean): --  RR: 16 (02-13-24 @ 08:31) (16 - 16)  SpO2: --    Orthostatic VS  02-13-24 @ 08:31  Lying BP: --/-- HR: --  Sitting BP: 106/71 HR: 65  Standing BP: 103/64 HR: 71  Site: --  Mode: --    Lipid Panel:

## 2024-02-12 NOTE — BH INPATIENT PSYCHIATRY PROGRESS NOTE - ATTENDING COMMENTS
Chart was reviewed and case discussed with the Psych NP. I agree with the assessment and plan as documented in the Psych NP's progress note and was directly involved in medical decision making.   on exam, pt is guarded, paranoid, suspicious ,poor insight into reasons for admission or mental illness. As per collateral, family has some concerns about pts aggression in the community when noncomplaint. Will begin AOT application given prior success while on AOT.

## 2024-02-12 NOTE — BH INPATIENT PSYCHIATRY PROGRESS NOTE - PRN MEDS
MEDICATIONS  (PRN):  diphenhydrAMINE 50 milliGRAM(s) Oral every 6 hours PRN agitation/eps prophylaxis  diphenhydrAMINE Injectable 50 milliGRAM(s) IntraMuscular once PRN severe agitation/eps prophylaxis  haloperidol     Tablet 5 milliGRAM(s) Oral every 6 hours PRN agitation  haloperidol    Injectable 5 milliGRAM(s) IntraMuscular once PRN severe agitation  LORazepam     Tablet 2 milliGRAM(s) Oral every 6 hours PRN Agitation  LORazepam   Injectable 2 milliGRAM(s) IntraMuscular Once PRN severe agitation   MEDICATIONS  (PRN):  diphenhydrAMINE 50 milliGRAM(s) Oral every 6 hours PRN agitation/eps prophylaxis  diphenhydrAMINE Injectable 50 milliGRAM(s) IntraMuscular once PRN severe agitation/eps prophylaxis  haloperidol     Tablet 5 milliGRAM(s) Oral every 6 hours PRN agitation  haloperidol    Injectable 5 milliGRAM(s) IntraMuscular once PRN severe agitation  LORazepam     Tablet 2 milliGRAM(s) Oral every 6 hours PRN Agitation  LORazepam   Injectable 2 milliGRAM(s) IntraMuscular Once PRN severe agitation  traZODone 50 milliGRAM(s) Oral at bedtime PRN insomnia

## 2024-02-12 NOTE — BH INPATIENT PSYCHIATRY PROGRESS NOTE - NSBHCHARTREVIEWVS_PSY_A_CORE FT
Vital Signs Last 24 Hrs  T(C): --  T(F): --  HR: --  BP: --  BP(mean): --  RR: 18 (02-12-24 @ 11:24) (18 - 18)  SpO2: --     Vital Signs Last 24 Hrs  T(C): 36.3 (02-13-24 @ 08:31), Max: 36.3 (02-13-24 @ 08:31)  T(F): 97.3 (02-13-24 @ 08:31), Max: 97.3 (02-13-24 @ 08:31)  HR: --  BP: --  BP(mean): --  RR: 16 (02-13-24 @ 08:31) (16 - 16)  SpO2: --    Orthostatic VS  02-13-24 @ 08:31  Lying BP: --/-- HR: --  Sitting BP: 106/71 HR: 65  Standing BP: 103/64 HR: 71  Site: --  Mode: --

## 2024-02-12 NOTE — BH SOCIAL WORK INITIAL PSYCHOSOCIAL EVALUATION - OTHER PAST PSYCHIATRIC HISTORY (INCLUDE DETAILS REGARDING ONSET, COURSE OF ILLNESS, INPATIENT/OUTPATIENT TREATMENT)
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, non-caregiver, with PPHx of Schizoaffective disorder bipolar type, and cannabis abuse, multiple past psych hospitalizations, most recently [facility] 12/11/23 and [place] 11/29/23, previous on AOT (stopped June 2023), chronic history of medication noncompliance, was on Invega Sustenna until June 2023; discharged from [facility] on Invega Sustenna 234mg and 156mg on 12/6/23 and 12/12/23, no history of self-injurious behavior or suicide attempts, documented history of property destruction & aggression/violence, history of probation for reckless driving approximately 5-6 years ago and has history of arrests for stealing and reynoso larceny, h/o daily marijuana use, brought in by EMS, activated by father, agitation and altercation at home in the context of treatment noncompliance, brought in to ED in handcuffs.    On exam, patient superficially cooperative and slightly irritable, some noticeable grandiosity throughout interview, thought content is at times illogical. Pt states that he does not know why he is here today, stating that he woke up at 4am this morning, wanted to find his headphones, used Cannibis, asked his father if he knew where the headphones are at, went outside to try to find them, and then came back to his room and the police came in the room. Pt denies any altercation. Cannot provide history about why he was handcuffed when brought to the ED. Pt states that police and mental health workers are a "conspiracy" and says that he is going to write a book and get a [employer] for his work. Pt does not think he needs any medications currently, denies mental health conditions. Patient denies active/passive suicidal/homicidal ideation, plan or intent. He denies AH/VH, but at times seemed internally preoccupied. Pt says that he has been sleeping from 8pm-2/3am recently, endorses higher energy. Pt denies depressed mood, says his mood is "amazing." Denies any legal issues. Endorses using marijuana daily but denies all other substance use. Pt stands up, starts to walk out of room, says that he is doing "great" and to "call his father" so that he can be discharged.    Writer met with pt to introduce self and obtain collateral. Pt presents as cooperative and pleasant upon approach. Pt reports that he lives with his mother and father but spends more time with his father as he lives closer to [place] where the pt likes to work. The address of his mother according to pt is [address] [place] [address]. Patient gave consent to speak with both his father Jerry ([phone]) and mother Luz (). Consent form is in the chart. The pt reports that the father might say things about the pt that aren't true as the pt believes that the father has "magical hutchins" and the pt interferes with his fathers hutchins. Pt does not express any concerns living with either parent at this time.  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, non-caregiver, with PPHx of Schizoaffective disorder bipolar type, and cannabis abuse, multiple past psych hospitalizations, most recently [facility] 12/11/23 and [place] 11/29/23, previous on AOT (stopped June 2023), chronic history of medication noncompliance, was on Invega Sustenna until June 2023; discharged from [facility] on Invega Sustenna 234mg and 156mg on 12/6/23 and 12/12/23, no history of self-injurious behavior or suicide attempts, documented history of property destruction & aggression/violence, history of probation for reckless driving approximately 5-6 years ago and has history of arrests for stealing and reynoso larceny, h/o daily marijuana use, brought in by EMS, activated by father, agitation and altercation at home in the context of treatment noncompliance, brought in to ED in handcuffs.    In ED, patient superficially cooperative and slightly irritable, some noticeable grandiosity throughout interview, thought content is at times illogical. Pt states that he does not know why he is here today, stating that he woke up at 4am this morning, wanted to find his headphones, used Cannibis, asked his father if he knew where the headphones are at, went outside to try to find them, and then came back to his room and the police came in the room. Pt denies any altercation. Cannot provide history about why he was handcuffed when brought to the ED. Pt states that police and mental health workers are a "conspiracy" and says that he is going to write a book and get a [employer] for his work. Pt does not think he needs any medications currently, denies mental health conditions. Patient denies active/passive suicidal/homicidal ideation, plan or intent. He denies AH/VH, but at times seemed internally preoccupied. Pt says that he has been sleeping from 8pm-2/3am recently, endorses higher energy. Pt denies depressed mood, says his mood is "amazing." Denies any legal issues. Endorses using marijuana daily but denies all other substance use. Pt stands up, starts to walk out of room, says that he is doing "great" and to "call his father" so that he can be discharged.    Writer met with pt to introduce self and obtain collateral. Pt presents as cooperative and pleasant upon approach. Pt reports that he lives with his mother and father but spends more time with his father as he lives closer to [place] where the pt likes to work. The address of his mother according to pt is [address] [place] [address]. Patient gave consent to speak with both his father Jerry ([phone]) and mother Luz (). Consent form is in the chart. The pt reports that the father might say things about the pt that aren't true as the pt believes that the father has "magical hutchins" and the pt interferes with his fathers hutchins. Pt does not express any concerns living with either parent at this time.  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, non-caregiver, with PPHx of Schizoaffective disorder bipolar type, and cannabis abuse, multiple past psych hospitalizations, most recently [facility] 12/11/23 and [place] 11/29/23, previous on AOT (stopped June 2023), chronic history of medication noncompliance, was on Invega Sustenna until June 2023; discharged from [facility] on Invega Sustenna 234mg and 156mg on 12/6/23 and 12/12/23, no history of self-injurious behavior or suicide attempts, documented history of property destruction & aggression/violence, history of probation for reckless driving approximately 5-6 years ago and has history of arrests for stealing and reynoso larceny, h/o daily marijuana use, brought in by EMS, activated by father, agitation and altercation at home in the context of treatment noncompliance, brought in to ED in handcuffs.    In ED, patient superficially cooperative and slightly irritable, some noticeable grandiosity throughout interview, thought content is at times illogical. Pt states that he does not know why he is here today, stating that he woke up at 4am this morning, wanted to find his headphones, used Cannibis, asked his father if he knew where the headphones are at, went outside to try to find them, and then came back to his room and the police came in the room. Pt denies any altercation. Cannot provide history about why he was handcuffed when brought to the ED. Pt states that police and mental health workers are a "conspiracy" and says that he is going to write a book and get a [employer] for his work. Pt does not think he needs any medications currently, denies mental health conditions. Patient denies active/passive suicidal/homicidal ideation, plan or intent. He denies AH/VH, but at times seemed internally preoccupied. Pt says that he has been sleeping from 8pm-2/3am recently, endorses higher energy. Pt denies depressed mood, says his mood is "amazing." Denies any legal issues. Endorses using marijuana daily but denies all other substance use. Pt stands up, starts to walk out of room, says that he is doing "great" and to "call his father" so that he can be discharged.    Writer met with pt to introduce self and obtain collateral. Pt presents as cooperative and pleasant upon approach. Pt reports that he lives with his mother and father but spends more time with his father as he lives closer to [place] where the pt likes to work. The address of his mother according to pt is [address] [place] [address]. Patient gave consent to speak with both his father Jerry ([phone]) and mother Luz (). Consent form is in the chart. The pt reports that the father might say things about the pt that aren't true as the pt believes that the father has "magical hutchins" and the pt interferes with his fathers hutchins. Pt does not express any concerns living with either parent at this time. As per discharge note from pt's last admission he was connected with outpatient at [address] [place], [place] [address] ().

## 2024-02-12 NOTE — BH INPATIENT PSYCHIATRY PROGRESS NOTE - CURRENT MEDICATION
MEDICATIONS  (STANDING):  risperiDONE   Tablet 1 milliGRAM(s) Oral two times a day    MEDICATIONS  (PRN):  diphenhydrAMINE 50 milliGRAM(s) Oral every 6 hours PRN agitation/eps prophylaxis  diphenhydrAMINE Injectable 50 milliGRAM(s) IntraMuscular once PRN severe agitation/eps prophylaxis  haloperidol     Tablet 5 milliGRAM(s) Oral every 6 hours PRN agitation  haloperidol    Injectable 5 milliGRAM(s) IntraMuscular once PRN severe agitation  LORazepam     Tablet 2 milliGRAM(s) Oral every 6 hours PRN Agitation  LORazepam   Injectable 2 milliGRAM(s) IntraMuscular Once PRN severe agitation   MEDICATIONS  (STANDING):  risperiDONE   Tablet 1 milliGRAM(s) Oral two times a day    MEDICATIONS  (PRN):  diphenhydrAMINE 50 milliGRAM(s) Oral every 6 hours PRN agitation/eps prophylaxis  diphenhydrAMINE Injectable 50 milliGRAM(s) IntraMuscular once PRN severe agitation/eps prophylaxis  haloperidol     Tablet 5 milliGRAM(s) Oral every 6 hours PRN agitation  haloperidol    Injectable 5 milliGRAM(s) IntraMuscular once PRN severe agitation  LORazepam     Tablet 2 milliGRAM(s) Oral every 6 hours PRN Agitation  LORazepam   Injectable 2 milliGRAM(s) IntraMuscular Once PRN severe agitation  traZODone 50 milliGRAM(s) Oral at bedtime PRN insomnia

## 2024-02-12 NOTE — BH SOCIAL WORK INITIAL PSYCHOSOCIAL EVALUATION - NSBHCHILDRESP_PSY_ALL_CORE
Pt stated he was unwilling to answer at this time./No Pt stated he was unwilling to answer at this time./Unable to answer (specify)

## 2024-02-13 PROCEDURE — 99232 SBSQ HOSP IP/OBS MODERATE 35: CPT

## 2024-02-13 RX ADMIN — RISPERIDONE 1 MILLIGRAM(S): 4 TABLET ORAL at 20:45

## 2024-02-13 RX ADMIN — Medication 2 MILLIGRAM(S): at 20:45

## 2024-02-13 RX ADMIN — Medication 50 MILLIGRAM(S): at 20:45

## 2024-02-13 RX ADMIN — RISPERIDONE 1 MILLIGRAM(S): 4 TABLET ORAL at 08:53

## 2024-02-13 RX ADMIN — HALOPERIDOL DECANOATE 5 MILLIGRAM(S): 100 INJECTION INTRAMUSCULAR at 20:45

## 2024-02-13 NOTE — BH INPATIENT PSYCHIATRY PROGRESS NOTE - NSBHCHARTREVIEWVS_PSY_A_CORE FT
Vital Signs Last 24 Hrs  T(C): 36.3 (02-13-24 @ 08:31), Max: 36.3 (02-13-24 @ 08:31)  T(F): 97.3 (02-13-24 @ 08:31), Max: 97.3 (02-13-24 @ 08:31)  HR: --  BP: --  BP(mean): --  RR: 16 (02-13-24 @ 08:31) (16 - 16)  SpO2: --    Orthostatic VS  02-13-24 @ 08:31  Lying BP: --/-- HR: --  Sitting BP: 106/71 HR: 65  Standing BP: 103/64 HR: 71  Site: --  Mode: --   Vital Signs Last 24 Hrs  T(C): 36.4 (02-14-24 @ 09:04), Max: 36.9 (02-13-24 @ 19:04)  T(F): 97.6 (02-14-24 @ 09:04), Max: 98.5 (02-13-24 @ 19:04)  HR: --  BP: --  BP(mean): --  RR: 18 (02-13-24 @ 23:26) (18 - 18)  SpO2: --    Orthostatic VS  02-14-24 @ 09:04  Lying BP: --/-- HR: --  Sitting BP: 127/65 HR: 86  Standing BP: 123/70 HR: 92  Site: --  Mode: --  Orthostatic VS  02-13-24 @ 19:04  Lying BP: --/-- HR: --  Sitting BP: 106/78 HR: 87  Standing BP: 121/64 HR: 91  Site: --  Mode: --  Orthostatic VS  02-13-24 @ 08:31  Lying BP: --/-- HR: --  Sitting BP: 106/71 HR: 65  Standing BP: 103/64 HR: 71  Site: --  Mode: --

## 2024-02-13 NOTE — BH INPATIENT PSYCHIATRY PROGRESS NOTE - ATTENDING COMMENTS
Chart was reviewed and case discussed with the Psych NP. I agree with the assessment and plan as documented in the Psych NP's progress note and was directly involved in medical decision making.   on exam, pt is guarded, paranoid, suspicious ,poor insight into reasons for admission or mental illness. As per collateral, family has some concerns about pts aggression in the community when noncomplaint. Will begin AOT application given prior success while on AOT.    Chart was reviewed and case discussed with the Psych NP. I agree with the assessment and plan as documented in the Psych NP's progress note and was directly involved in medical decision making.

## 2024-02-13 NOTE — BH INPATIENT PSYCHIATRY PROGRESS NOTE - PRN MEDS
MEDICATIONS  (PRN):  diphenhydrAMINE 50 milliGRAM(s) Oral every 6 hours PRN agitation/eps prophylaxis  diphenhydrAMINE Injectable 50 milliGRAM(s) IntraMuscular once PRN severe agitation/eps prophylaxis  haloperidol     Tablet 5 milliGRAM(s) Oral every 6 hours PRN agitation  haloperidol    Injectable 5 milliGRAM(s) IntraMuscular once PRN severe agitation  LORazepam     Tablet 2 milliGRAM(s) Oral every 6 hours PRN Agitation  LORazepam   Injectable 2 milliGRAM(s) IntraMuscular Once PRN severe agitation  traZODone 50 milliGRAM(s) Oral at bedtime PRN insomnia

## 2024-02-13 NOTE — BH INPATIENT PSYCHIATRY PROGRESS NOTE - NSBHMETABOLIC_PSY_ALL_CORE_FT
BMI: BMI (kg/m2): 27.8 (02-10-24 @ 05:52)  HbA1c:   Glucose:   BP: --Vital Signs Last 24 Hrs  T(C): 36.3 (02-13-24 @ 08:31), Max: 36.3 (02-13-24 @ 08:31)  T(F): 97.3 (02-13-24 @ 08:31), Max: 97.3 (02-13-24 @ 08:31)  HR: --  BP: --  BP(mean): --  RR: 16 (02-13-24 @ 08:31) (16 - 16)  SpO2: --    Orthostatic VS  02-13-24 @ 08:31  Lying BP: --/-- HR: --  Sitting BP: 106/71 HR: 65  Standing BP: 103/64 HR: 71  Site: --  Mode: --    Lipid Panel:  BMI: BMI (kg/m2): 27.8 (02-10-24 @ 05:52)  HbA1c:   Glucose:   BP: --Vital Signs Last 24 Hrs  T(C): 36.4 (02-14-24 @ 09:04), Max: 36.9 (02-13-24 @ 19:04)  T(F): 97.6 (02-14-24 @ 09:04), Max: 98.5 (02-13-24 @ 19:04)  HR: --  BP: --  BP(mean): --  RR: 18 (02-13-24 @ 23:26) (18 - 18)  SpO2: --    Orthostatic VS  02-14-24 @ 09:04  Lying BP: --/-- HR: --  Sitting BP: 127/65 HR: 86  Standing BP: 123/70 HR: 92  Site: --  Mode: --  Orthostatic VS  02-13-24 @ 19:04  Lying BP: --/-- HR: --  Sitting BP: 106/78 HR: 87  Standing BP: 121/64 HR: 91  Site: --  Mode: --  Orthostatic VS  02-13-24 @ 08:31  Lying BP: --/-- HR: --  Sitting BP: 106/71 HR: 65  Standing BP: 103/64 HR: 71  Site: --  Mode: --    Lipid Panel:

## 2024-02-13 NOTE — BH INPATIENT PSYCHIATRY PROGRESS NOTE - CURRENT MEDICATION
MEDICATIONS  (STANDING):  risperiDONE   Tablet 1 milliGRAM(s) Oral two times a day    MEDICATIONS  (PRN):  diphenhydrAMINE 50 milliGRAM(s) Oral every 6 hours PRN agitation/eps prophylaxis  diphenhydrAMINE Injectable 50 milliGRAM(s) IntraMuscular once PRN severe agitation/eps prophylaxis  haloperidol     Tablet 5 milliGRAM(s) Oral every 6 hours PRN agitation  haloperidol    Injectable 5 milliGRAM(s) IntraMuscular once PRN severe agitation  LORazepam     Tablet 2 milliGRAM(s) Oral every 6 hours PRN Agitation  LORazepam   Injectable 2 milliGRAM(s) IntraMuscular Once PRN severe agitation  traZODone 50 milliGRAM(s) Oral at bedtime PRN insomnia

## 2024-02-14 PROCEDURE — 99232 SBSQ HOSP IP/OBS MODERATE 35: CPT

## 2024-02-14 RX ORDER — RISPERIDONE 4 MG/1
1 TABLET ORAL DAILY
Refills: 0 | Status: DISCONTINUED | OUTPATIENT
Start: 2024-02-15 | End: 2024-02-20

## 2024-02-14 RX ORDER — RISPERIDONE 4 MG/1
2 TABLET ORAL AT BEDTIME
Refills: 0 | Status: DISCONTINUED | OUTPATIENT
Start: 2024-02-14 | End: 2024-02-20

## 2024-02-14 RX ADMIN — Medication 50 MILLIGRAM(S): at 20:12

## 2024-02-14 RX ADMIN — RISPERIDONE 1 MILLIGRAM(S): 4 TABLET ORAL at 09:51

## 2024-02-14 RX ADMIN — RISPERIDONE 2 MILLIGRAM(S): 4 TABLET ORAL at 20:12

## 2024-02-14 RX ADMIN — HALOPERIDOL DECANOATE 5 MILLIGRAM(S): 100 INJECTION INTRAMUSCULAR at 20:12

## 2024-02-14 RX ADMIN — Medication 2 MILLIGRAM(S): at 20:12

## 2024-02-14 NOTE — BH INPATIENT PSYCHIATRY PROGRESS NOTE - ATTENDING COMMENTS
Chart was reviewed and case discussed with the Psych NP. I agree with the assessment and plan as documented in the Psych NP's progress note and was directly involved in medical decision making.

## 2024-02-14 NOTE — BH INPATIENT PSYCHIATRY PROGRESS NOTE - NSBHCHARTREVIEWVS_PSY_A_CORE FT
Vital Signs Last 24 Hrs  T(C): 36.4 (02-14-24 @ 09:04), Max: 36.9 (02-13-24 @ 19:04)  T(F): 97.6 (02-14-24 @ 09:04), Max: 98.5 (02-13-24 @ 19:04)  HR: --  BP: --  BP(mean): --  RR: 18 (02-13-24 @ 23:26) (18 - 18)  SpO2: --    Orthostatic VS  02-14-24 @ 09:04  Lying BP: --/-- HR: --  Sitting BP: 127/65 HR: 86  Standing BP: 123/70 HR: 92  Site: --  Mode: --  Orthostatic VS  02-13-24 @ 19:04  Lying BP: --/-- HR: --  Sitting BP: 106/78 HR: 87  Standing BP: 121/64 HR: 91  Site: --  Mode: --  Orthostatic VS  02-13-24 @ 08:31  Lying BP: --/-- HR: --  Sitting BP: 106/71 HR: 65  Standing BP: 103/64 HR: 71  Site: --  Mode: --   Vital Signs Last 24 Hrs  T(C): 36.4 (02-15-24 @ 08:17), Max: 36.4 (02-15-24 @ 08:17)  T(F): 97.6 (02-15-24 @ 08:17), Max: 97.6 (02-15-24 @ 08:17)  HR: --  BP: --  BP(mean): --  RR: 18 (02-14-24 @ 21:27) (18 - 18)  SpO2: --    Orthostatic VS  02-15-24 @ 08:17  Lying BP: --/-- HR: --  Sitting BP: 124/70 HR: 96  Standing BP: 112/76 HR: 90  Site: --  Mode: --  Orthostatic VS  02-14-24 @ 19:45  Lying BP: --/-- HR: --  Sitting BP: 115/61 HR: 106  Standing BP: 114/64 HR: 104  Site: --  Mode: --  Orthostatic VS  02-14-24 @ 09:04  Lying BP: --/-- HR: --  Sitting BP: 127/65 HR: 86  Standing BP: 123/70 HR: 92  Site: --  Mode: --  Orthostatic VS  02-13-24 @ 19:04  Lying BP: --/-- HR: --  Sitting BP: 106/78 HR: 87  Standing BP: 121/64 HR: 91  Site: --  Mode: --

## 2024-02-14 NOTE — BH INPATIENT PSYCHIATRY PROGRESS NOTE - NSBHMETABOLIC_PSY_ALL_CORE_FT
BMI: BMI (kg/m2): 27.8 (02-10-24 @ 05:52)  HbA1c:   Glucose:   BP: --Vital Signs Last 24 Hrs  T(C): 36.4 (02-14-24 @ 09:04), Max: 36.9 (02-13-24 @ 19:04)  T(F): 97.6 (02-14-24 @ 09:04), Max: 98.5 (02-13-24 @ 19:04)  HR: --  BP: --  BP(mean): --  RR: 18 (02-13-24 @ 23:26) (18 - 18)  SpO2: --    Orthostatic VS  02-14-24 @ 09:04  Lying BP: --/-- HR: --  Sitting BP: 127/65 HR: 86  Standing BP: 123/70 HR: 92  Site: --  Mode: --  Orthostatic VS  02-13-24 @ 19:04  Lying BP: --/-- HR: --  Sitting BP: 106/78 HR: 87  Standing BP: 121/64 HR: 91  Site: --  Mode: --  Orthostatic VS  02-13-24 @ 08:31  Lying BP: --/-- HR: --  Sitting BP: 106/71 HR: 65  Standing BP: 103/64 HR: 71  Site: --  Mode: --    Lipid Panel:  BMI: BMI (kg/m2): 27.8 (02-10-24 @ 05:52)  HbA1c:   Glucose:   BP: --Vital Signs Last 24 Hrs  T(C): 36.4 (02-15-24 @ 08:17), Max: 36.4 (02-15-24 @ 08:17)  T(F): 97.6 (02-15-24 @ 08:17), Max: 97.6 (02-15-24 @ 08:17)  HR: --  BP: --  BP(mean): --  RR: 18 (02-14-24 @ 21:27) (18 - 18)  SpO2: --    Orthostatic VS  02-15-24 @ 08:17  Lying BP: --/-- HR: --  Sitting BP: 124/70 HR: 96  Standing BP: 112/76 HR: 90  Site: --  Mode: --  Orthostatic VS  02-14-24 @ 19:45  Lying BP: --/-- HR: --  Sitting BP: 115/61 HR: 106  Standing BP: 114/64 HR: 104  Site: --  Mode: --  Orthostatic VS  02-14-24 @ 09:04  Lying BP: --/-- HR: --  Sitting BP: 127/65 HR: 86  Standing BP: 123/70 HR: 92  Site: --  Mode: --  Orthostatic VS  02-13-24 @ 19:04  Lying BP: --/-- HR: --  Sitting BP: 106/78 HR: 87  Standing BP: 121/64 HR: 91  Site: --  Mode: --    Lipid Panel:

## 2024-02-14 NOTE — BH INPATIENT PSYCHIATRY PROGRESS NOTE - CURRENT MEDICATION
MEDICATIONS  (STANDING):  risperiDONE   Tablet 1 milliGRAM(s) Oral two times a day    MEDICATIONS  (PRN):  diphenhydrAMINE 50 milliGRAM(s) Oral every 6 hours PRN agitation/eps prophylaxis  diphenhydrAMINE Injectable 50 milliGRAM(s) IntraMuscular once PRN severe agitation/eps prophylaxis  haloperidol     Tablet 5 milliGRAM(s) Oral every 6 hours PRN agitation  haloperidol    Injectable 5 milliGRAM(s) IntraMuscular once PRN severe agitation  LORazepam     Tablet 2 milliGRAM(s) Oral every 6 hours PRN Agitation  LORazepam   Injectable 2 milliGRAM(s) IntraMuscular Once PRN severe agitation  traZODone 50 milliGRAM(s) Oral at bedtime PRN insomnia   MEDICATIONS  (STANDING):  risperiDONE   Tablet 2 milliGRAM(s) Oral at bedtime  risperiDONE   Tablet 1 milliGRAM(s) Oral daily    MEDICATIONS  (PRN):  diphenhydrAMINE 50 milliGRAM(s) Oral every 6 hours PRN agitation/eps prophylaxis  diphenhydrAMINE Injectable 50 milliGRAM(s) IntraMuscular once PRN severe agitation/eps prophylaxis  haloperidol     Tablet 5 milliGRAM(s) Oral every 6 hours PRN agitation  haloperidol    Injectable 5 milliGRAM(s) IntraMuscular once PRN severe agitation  LORazepam     Tablet 2 milliGRAM(s) Oral every 6 hours PRN Agitation  LORazepam   Injectable 2 milliGRAM(s) IntraMuscular Once PRN severe agitation  traZODone 50 milliGRAM(s) Oral at bedtime PRN insomnia

## 2024-02-15 PROCEDURE — 99232 SBSQ HOSP IP/OBS MODERATE 35: CPT

## 2024-02-15 RX ADMIN — Medication 50 MILLIGRAM(S): at 20:19

## 2024-02-15 RX ADMIN — RISPERIDONE 1 MILLIGRAM(S): 4 TABLET ORAL at 08:36

## 2024-02-15 RX ADMIN — RISPERIDONE 2 MILLIGRAM(S): 4 TABLET ORAL at 20:19

## 2024-02-15 NOTE — BH INPATIENT PSYCHIATRY PROGRESS NOTE - NSBHCHARTREVIEWVS_PSY_A_CORE FT
Vital Signs Last 24 Hrs  T(C): 36.4 (02-15-24 @ 08:17), Max: 36.4 (02-15-24 @ 08:17)  T(F): 97.6 (02-15-24 @ 08:17), Max: 97.6 (02-15-24 @ 08:17)  HR: --  BP: --  BP(mean): --  RR: 18 (02-15-24 @ 08:17) (18 - 18)  SpO2: --    Orthostatic VS  02-15-24 @ 08:17  Lying BP: --/-- HR: --  Sitting BP: 124/70 HR: 96  Standing BP: 112/76 HR: 90  Site: --  Mode: --  Orthostatic VS  02-14-24 @ 19:45  Lying BP: --/-- HR: --  Sitting BP: 115/61 HR: 106  Standing BP: 114/64 HR: 104  Site: --  Mode: --  Orthostatic VS  02-14-24 @ 09:04  Lying BP: --/-- HR: --  Sitting BP: 127/65 HR: 86  Standing BP: 123/70 HR: 92  Site: --  Mode: --  Orthostatic VS  02-13-24 @ 19:04  Lying BP: --/-- HR: --  Sitting BP: 106/78 HR: 87  Standing BP: 121/64 HR: 91  Site: --  Mode: --   Vital Signs Last 24 Hrs  T(C): 35.9 (02-16-24 @ 08:41), Max: 36.6 (02-15-24 @ 19:12)  T(F): 96.6 (02-16-24 @ 08:41), Max: 97.9 (02-15-24 @ 19:12)  HR: --  BP: --  BP(mean): --  RR: --  SpO2: --    Orthostatic VS  02-16-24 @ 08:41  Lying BP: --/-- HR: --  Sitting BP: 136/74 HR: 97  Standing BP: 124/88 HR: 114  Site: --  Mode: --  Orthostatic VS  02-15-24 @ 19:12  Lying BP: --/-- HR: --  Sitting BP: 122/73 HR: 101  Standing BP: 112/68 HR: 119  Site: --  Mode: --  Orthostatic VS  02-15-24 @ 08:17  Lying BP: --/-- HR: --  Sitting BP: 124/70 HR: 96  Standing BP: 112/76 HR: 90  Site: --  Mode: --  Orthostatic VS  02-14-24 @ 19:45  Lying BP: --/-- HR: --  Sitting BP: 115/61 HR: 106  Standing BP: 114/64 HR: 104  Site: --  Mode: --

## 2024-02-15 NOTE — BH INPATIENT PSYCHIATRY PROGRESS NOTE - NSBHMETABOLIC_PSY_ALL_CORE_FT
BMI: BMI (kg/m2): 27.8 (02-10-24 @ 05:52)  HbA1c:   Glucose:   BP: --Vital Signs Last 24 Hrs  T(C): 36.4 (02-15-24 @ 08:17), Max: 36.4 (02-15-24 @ 08:17)  T(F): 97.6 (02-15-24 @ 08:17), Max: 97.6 (02-15-24 @ 08:17)  HR: --  BP: --  BP(mean): --  RR: 18 (02-15-24 @ 08:17) (18 - 18)  SpO2: --    Orthostatic VS  02-15-24 @ 08:17  Lying BP: --/-- HR: --  Sitting BP: 124/70 HR: 96  Standing BP: 112/76 HR: 90  Site: --  Mode: --  Orthostatic VS  02-14-24 @ 19:45  Lying BP: --/-- HR: --  Sitting BP: 115/61 HR: 106  Standing BP: 114/64 HR: 104  Site: --  Mode: --  Orthostatic VS  02-14-24 @ 09:04  Lying BP: --/-- HR: --  Sitting BP: 127/65 HR: 86  Standing BP: 123/70 HR: 92  Site: --  Mode: --  Orthostatic VS  02-13-24 @ 19:04  Lying BP: --/-- HR: --  Sitting BP: 106/78 HR: 87  Standing BP: 121/64 HR: 91  Site: --  Mode: --    Lipid Panel:  BMI: BMI (kg/m2): 27.8 (02-10-24 @ 05:52)  HbA1c:   Glucose:   BP: --Vital Signs Last 24 Hrs  T(C): 35.9 (02-16-24 @ 08:41), Max: 36.6 (02-15-24 @ 19:12)  T(F): 96.6 (02-16-24 @ 08:41), Max: 97.9 (02-15-24 @ 19:12)  HR: --  BP: --  BP(mean): --  RR: --  SpO2: --    Orthostatic VS  02-16-24 @ 08:41  Lying BP: --/-- HR: --  Sitting BP: 136/74 HR: 97  Standing BP: 124/88 HR: 114  Site: --  Mode: --  Orthostatic VS  02-15-24 @ 19:12  Lying BP: --/-- HR: --  Sitting BP: 122/73 HR: 101  Standing BP: 112/68 HR: 119  Site: --  Mode: --  Orthostatic VS  02-15-24 @ 08:17  Lying BP: --/-- HR: --  Sitting BP: 124/70 HR: 96  Standing BP: 112/76 HR: 90  Site: --  Mode: --  Orthostatic VS  02-14-24 @ 19:45  Lying BP: --/-- HR: --  Sitting BP: 115/61 HR: 106  Standing BP: 114/64 HR: 104  Site: --  Mode: --    Lipid Panel:

## 2024-02-15 NOTE — BH INPATIENT PSYCHIATRY PROGRESS NOTE - CURRENT MEDICATION
MEDICATIONS  (STANDING):  risperiDONE   Tablet 1 milliGRAM(s) Oral daily  risperiDONE   Tablet 2 milliGRAM(s) Oral at bedtime    MEDICATIONS  (PRN):  diphenhydrAMINE 50 milliGRAM(s) Oral every 6 hours PRN agitation/eps prophylaxis  diphenhydrAMINE Injectable 50 milliGRAM(s) IntraMuscular once PRN severe agitation/eps prophylaxis  haloperidol     Tablet 5 milliGRAM(s) Oral every 6 hours PRN agitation  haloperidol    Injectable 5 milliGRAM(s) IntraMuscular once PRN severe agitation  LORazepam     Tablet 2 milliGRAM(s) Oral every 6 hours PRN Agitation  LORazepam   Injectable 2 milliGRAM(s) IntraMuscular Once PRN severe agitation  traZODone 50 milliGRAM(s) Oral at bedtime PRN insomnia   MEDICATIONS  (STANDING):  risperiDONE   Tablet 2 milliGRAM(s) Oral at bedtime  risperiDONE   Tablet 1 milliGRAM(s) Oral daily    MEDICATIONS  (PRN):  diphenhydrAMINE 50 milliGRAM(s) Oral every 6 hours PRN agitation/eps prophylaxis  diphenhydrAMINE Injectable 50 milliGRAM(s) IntraMuscular once PRN severe agitation/eps prophylaxis  haloperidol     Tablet 5 milliGRAM(s) Oral every 6 hours PRN agitation  haloperidol    Injectable 5 milliGRAM(s) IntraMuscular once PRN severe agitation  LORazepam     Tablet 2 milliGRAM(s) Oral every 6 hours PRN Agitation  LORazepam   Injectable 2 milliGRAM(s) IntraMuscular Once PRN severe agitation  traZODone 50 milliGRAM(s) Oral at bedtime PRN insomnia

## 2024-02-16 PROCEDURE — 99232 SBSQ HOSP IP/OBS MODERATE 35: CPT

## 2024-02-16 PROCEDURE — 90853 GROUP PSYCHOTHERAPY: CPT

## 2024-02-16 RX ADMIN — RISPERIDONE 1 MILLIGRAM(S): 4 TABLET ORAL at 08:26

## 2024-02-16 RX ADMIN — Medication 50 MILLIGRAM(S): at 21:38

## 2024-02-16 RX ADMIN — HALOPERIDOL DECANOATE 5 MILLIGRAM(S): 100 INJECTION INTRAMUSCULAR at 21:37

## 2024-02-16 RX ADMIN — Medication 50 MILLIGRAM(S): at 21:37

## 2024-02-16 RX ADMIN — RISPERIDONE 2 MILLIGRAM(S): 4 TABLET ORAL at 21:37

## 2024-02-16 NOTE — BH PSYCHOLOGY - GROUP THERAPY NOTE - TOKEN PULL-DIAGNOSIS
Primary Diagnosis:  Schizoaffective disorder, bipolar type [F25.0]        Problem Dx:   Cannabis use disorder [F12.90]

## 2024-02-16 NOTE — BH INPATIENT PSYCHIATRY PROGRESS NOTE - NSBHMETABOLIC_PSY_ALL_CORE_FT
BMI: BMI (kg/m2): 27.8 (02-10-24 @ 05:52)  HbA1c:   Glucose:   BP: --Vital Signs Last 24 Hrs  T(C): 35.9 (02-16-24 @ 08:41), Max: 36.6 (02-15-24 @ 19:12)  T(F): 96.6 (02-16-24 @ 08:41), Max: 97.9 (02-15-24 @ 19:12)  HR: --  BP: --  BP(mean): --  RR: --  SpO2: --    Orthostatic VS  02-16-24 @ 08:41  Lying BP: --/-- HR: --  Sitting BP: 136/74 HR: 97  Standing BP: 124/88 HR: 114  Site: --  Mode: --  Orthostatic VS  02-15-24 @ 19:12  Lying BP: --/-- HR: --  Sitting BP: 122/73 HR: 101  Standing BP: 112/68 HR: 119  Site: --  Mode: --  Orthostatic VS  02-15-24 @ 08:17  Lying BP: --/-- HR: --  Sitting BP: 124/70 HR: 96  Standing BP: 112/76 HR: 90  Site: --  Mode: --  Orthostatic VS  02-14-24 @ 19:45  Lying BP: --/-- HR: --  Sitting BP: 115/61 HR: 106  Standing BP: 114/64 HR: 104  Site: --  Mode: --    Lipid Panel:

## 2024-02-16 NOTE — BH INPATIENT PSYCHIATRY PROGRESS NOTE - NSBHCHARTREVIEWVS_PSY_A_CORE FT
Vital Signs Last 24 Hrs  T(C): 35.9 (02-16-24 @ 08:41), Max: 36.6 (02-15-24 @ 19:12)  T(F): 96.6 (02-16-24 @ 08:41), Max: 97.9 (02-15-24 @ 19:12)  HR: --  BP: --  BP(mean): --  RR: --  SpO2: --    Orthostatic VS  02-16-24 @ 08:41  Lying BP: --/-- HR: --  Sitting BP: 136/74 HR: 97  Standing BP: 124/88 HR: 114  Site: --  Mode: --  Orthostatic VS  02-15-24 @ 19:12  Lying BP: --/-- HR: --  Sitting BP: 122/73 HR: 101  Standing BP: 112/68 HR: 119  Site: --  Mode: --  Orthostatic VS  02-15-24 @ 08:17  Lying BP: --/-- HR: --  Sitting BP: 124/70 HR: 96  Standing BP: 112/76 HR: 90  Site: --  Mode: --  Orthostatic VS  02-14-24 @ 19:45  Lying BP: --/-- HR: --  Sitting BP: 115/61 HR: 106  Standing BP: 114/64 HR: 104  Site: --  Mode: --

## 2024-02-17 RX ADMIN — Medication 50 MILLIGRAM(S): at 20:06

## 2024-02-17 RX ADMIN — RISPERIDONE 2 MILLIGRAM(S): 4 TABLET ORAL at 20:04

## 2024-02-17 RX ADMIN — RISPERIDONE 1 MILLIGRAM(S): 4 TABLET ORAL at 10:13

## 2024-02-17 RX ADMIN — Medication 50 MILLIGRAM(S): at 20:04

## 2024-02-18 RX ADMIN — RISPERIDONE 2 MILLIGRAM(S): 4 TABLET ORAL at 21:05

## 2024-02-18 RX ADMIN — RISPERIDONE 1 MILLIGRAM(S): 4 TABLET ORAL at 09:49

## 2024-02-19 RX ADMIN — RISPERIDONE 1 MILLIGRAM(S): 4 TABLET ORAL at 08:45

## 2024-02-19 RX ADMIN — RISPERIDONE 2 MILLIGRAM(S): 4 TABLET ORAL at 20:08

## 2024-02-20 PROCEDURE — 90853 GROUP PSYCHOTHERAPY: CPT

## 2024-02-20 PROCEDURE — 99232 SBSQ HOSP IP/OBS MODERATE 35: CPT

## 2024-02-20 RX ORDER — RISPERIDONE 4 MG/1
2 TABLET ORAL
Refills: 0 | Status: DISCONTINUED | OUTPATIENT
Start: 2024-02-20 | End: 2024-03-01

## 2024-02-20 RX ADMIN — RISPERIDONE 2 MILLIGRAM(S): 4 TABLET ORAL at 20:19

## 2024-02-20 RX ADMIN — Medication 50 MILLIGRAM(S): at 20:19

## 2024-02-20 RX ADMIN — RISPERIDONE 1 MILLIGRAM(S): 4 TABLET ORAL at 08:32

## 2024-02-20 RX ADMIN — HALOPERIDOL DECANOATE 5 MILLIGRAM(S): 100 INJECTION INTRAMUSCULAR at 20:19

## 2024-02-20 NOTE — BH INPATIENT PSYCHIATRY PROGRESS NOTE - ATTENDING COMMENTS
Chart was reviewed and case discussed with the Psych NP. I agree with the assessment and plan as documented in the Psych NP's progress note and was directly involved in medical decision making.  Chart was reviewed and case discussed with the Psych NP. I agree with the assessment and plan as documented in the Psych NP's progress note and was directly involved in medical decision making.

## 2024-02-20 NOTE — BH INPATIENT PSYCHIATRY PROGRESS NOTE - NSBHCHARTREVIEWVS_PSY_A_CORE FT
Vital Signs Last 24 Hrs  T(C): 36.6 (02-20-24 @ 08:39), Max: 36.8 (02-19-24 @ 19:17)  T(F): 97.9 (02-20-24 @ 08:39), Max: 98.2 (02-19-24 @ 19:17)  HR: --  BP: --  BP(mean): --  RR: 18 (02-20-24 @ 08:39) (18 - 18)  SpO2: --    Orthostatic VS  02-20-24 @ 08:39  Lying BP: --/-- HR: --  Sitting BP: 104/73 HR: 71  Standing BP: 113/86 HR: 102  Site: --  Mode: --  Orthostatic VS  02-19-24 @ 19:17  Lying BP: --/-- HR: --  Sitting BP: 132/78 HR: 97  Standing BP: 126/71 HR: 108  Site: --  Mode: --  Orthostatic VS  02-19-24 @ 08:35  Lying BP: --/-- HR: --  Sitting BP: 116/70 HR: 114  Standing BP: 122/66 HR: 92  Site: --  Mode: --  Orthostatic VS  02-18-24 @ 19:26  Lying BP: --/-- HR: --  Sitting BP: 140/79 HR: 70  Standing BP: 142/73 HR: 79  Site: --  Mode: --   Vital Signs Last 24 Hrs  T(C): 36.4 (02-20-24 @ 19:15), Max: 36.4 (02-20-24 @ 19:14)  T(F): 97.6 (02-20-24 @ 19:15), Max: 97.6 (02-20-24 @ 19:14)  HR: --  BP: --  BP(mean): --  RR: 18 (02-20-24 @ 22:50) (18 - 18)  SpO2: --    Orthostatic VS  02-20-24 @ 19:15  Lying BP: --/-- HR: --  Sitting BP: 128/67 HR: 82  Standing BP: 132/80 HR: 83  Site: --  Mode: --  Orthostatic VS  02-20-24 @ 19:14  Lying BP: --/-- HR: --  Sitting BP: 128/67 HR: 82  Standing BP: 132/80 HR: 83  Site: --  Mode: --  Orthostatic VS  02-20-24 @ 08:39  Lying BP: --/-- HR: --  Sitting BP: 104/73 HR: 71  Standing BP: 113/86 HR: 102  Site: --  Mode: --  Orthostatic VS  02-19-24 @ 19:17  Lying BP: --/-- HR: --  Sitting BP: 132/78 HR: 97  Standing BP: 126/71 HR: 108  Site: --  Mode: --

## 2024-02-20 NOTE — BH INPATIENT PSYCHIATRY PROGRESS NOTE - NSBHMETABOLIC_PSY_ALL_CORE_FT
BMI: BMI (kg/m2): 27.8 (02-10-24 @ 05:52)  HbA1c:   Glucose:   BP: --Vital Signs Last 24 Hrs  T(C): 36.6 (02-20-24 @ 08:39), Max: 36.8 (02-19-24 @ 19:17)  T(F): 97.9 (02-20-24 @ 08:39), Max: 98.2 (02-19-24 @ 19:17)  HR: --  BP: --  BP(mean): --  RR: 18 (02-20-24 @ 08:39) (18 - 18)  SpO2: --    Orthostatic VS  02-20-24 @ 08:39  Lying BP: --/-- HR: --  Sitting BP: 104/73 HR: 71  Standing BP: 113/86 HR: 102  Site: --  Mode: --  Orthostatic VS  02-19-24 @ 19:17  Lying BP: --/-- HR: --  Sitting BP: 132/78 HR: 97  Standing BP: 126/71 HR: 108  Site: --  Mode: --  Orthostatic VS  02-19-24 @ 08:35  Lying BP: --/-- HR: --  Sitting BP: 116/70 HR: 114  Standing BP: 122/66 HR: 92  Site: --  Mode: --  Orthostatic VS  02-18-24 @ 19:26  Lying BP: --/-- HR: --  Sitting BP: 140/79 HR: 70  Standing BP: 142/73 HR: 79  Site: --  Mode: --    Lipid Panel:  BMI: BMI (kg/m2): 27.8 (02-10-24 @ 05:52)  HbA1c:   Glucose:   BP: --Vital Signs Last 24 Hrs  T(C): 36.4 (02-20-24 @ 19:15), Max: 36.4 (02-20-24 @ 19:14)  T(F): 97.6 (02-20-24 @ 19:15), Max: 97.6 (02-20-24 @ 19:14)  HR: --  BP: --  BP(mean): --  RR: 18 (02-20-24 @ 22:50) (18 - 18)  SpO2: --    Orthostatic VS  02-20-24 @ 19:15  Lying BP: --/-- HR: --  Sitting BP: 128/67 HR: 82  Standing BP: 132/80 HR: 83  Site: --  Mode: --  Orthostatic VS  02-20-24 @ 19:14  Lying BP: --/-- HR: --  Sitting BP: 128/67 HR: 82  Standing BP: 132/80 HR: 83  Site: --  Mode: --  Orthostatic VS  02-20-24 @ 08:39  Lying BP: --/-- HR: --  Sitting BP: 104/73 HR: 71  Standing BP: 113/86 HR: 102  Site: --  Mode: --  Orthostatic VS  02-19-24 @ 19:17  Lying BP: --/-- HR: --  Sitting BP: 132/78 HR: 97  Standing BP: 126/71 HR: 108  Site: --  Mode: --    Lipid Panel:

## 2024-02-20 NOTE — BH INPATIENT PSYCHIATRY PROGRESS NOTE - CURRENT MEDICATION
MEDICATIONS  (STANDING):  risperiDONE   Tablet 2 milliGRAM(s) Oral two times a day    MEDICATIONS  (PRN):  diphenhydrAMINE 50 milliGRAM(s) Oral every 6 hours PRN agitation/eps prophylaxis  diphenhydrAMINE Injectable 50 milliGRAM(s) IntraMuscular once PRN severe agitation/eps prophylaxis  haloperidol     Tablet 5 milliGRAM(s) Oral every 6 hours PRN agitation  haloperidol    Injectable 5 milliGRAM(s) IntraMuscular once PRN severe agitation  traZODone 50 milliGRAM(s) Oral at bedtime PRN insomnia

## 2024-02-20 NOTE — BH INPATIENT PSYCHIATRY PROGRESS NOTE - PRN MEDS
MEDICATIONS  (PRN):  diphenhydrAMINE 50 milliGRAM(s) Oral every 6 hours PRN agitation/eps prophylaxis  diphenhydrAMINE Injectable 50 milliGRAM(s) IntraMuscular once PRN severe agitation/eps prophylaxis  haloperidol     Tablet 5 milliGRAM(s) Oral every 6 hours PRN agitation  haloperidol    Injectable 5 milliGRAM(s) IntraMuscular once PRN severe agitation  traZODone 50 milliGRAM(s) Oral at bedtime PRN insomnia

## 2024-02-21 PROCEDURE — 99232 SBSQ HOSP IP/OBS MODERATE 35: CPT

## 2024-02-21 RX ADMIN — RISPERIDONE 2 MILLIGRAM(S): 4 TABLET ORAL at 20:25

## 2024-02-21 RX ADMIN — HALOPERIDOL DECANOATE 5 MILLIGRAM(S): 100 INJECTION INTRAMUSCULAR at 20:25

## 2024-02-21 RX ADMIN — Medication 50 MILLIGRAM(S): at 20:25

## 2024-02-21 RX ADMIN — RISPERIDONE 2 MILLIGRAM(S): 4 TABLET ORAL at 08:26

## 2024-02-21 NOTE — BH INPATIENT PSYCHIATRY PROGRESS NOTE - NSBHMETABOLIC_PSY_ALL_CORE_FT
BMI: BMI (kg/m2): 27.8 (02-10-24 @ 05:52)  HbA1c:   Glucose:   BP: --Vital Signs Last 24 Hrs  T(C): 36.4 (02-20-24 @ 19:15), Max: 36.4 (02-20-24 @ 19:14)  T(F): 97.6 (02-20-24 @ 19:15), Max: 97.6 (02-20-24 @ 19:14)  HR: --  BP: --  BP(mean): --  RR: 18 (02-20-24 @ 22:50) (18 - 18)  SpO2: --    Orthostatic VS  02-20-24 @ 19:15  Lying BP: --/-- HR: --  Sitting BP: 128/67 HR: 82  Standing BP: 132/80 HR: 83  Site: --  Mode: --  Orthostatic VS  02-20-24 @ 19:14  Lying BP: --/-- HR: --  Sitting BP: 128/67 HR: 82  Standing BP: 132/80 HR: 83  Site: --  Mode: --  Orthostatic VS  02-20-24 @ 08:39  Lying BP: --/-- HR: --  Sitting BP: 104/73 HR: 71  Standing BP: 113/86 HR: 102  Site: --  Mode: --  Orthostatic VS  02-19-24 @ 19:17  Lying BP: --/-- HR: --  Sitting BP: 132/78 HR: 97  Standing BP: 126/71 HR: 108  Site: --  Mode: --    Lipid Panel:  BMI: BMI (kg/m2): 27.8 (02-10-24 @ 05:52)  HbA1c:   Glucose:   BP: --Vital Signs Last 24 Hrs  T(C): 36.4 (02-22-24 @ 08:46), Max: 36.7 (02-21-24 @ 19:06)  T(F): 97.5 (02-22-24 @ 08:46), Max: 98.1 (02-21-24 @ 19:06)  HR: --  BP: --  BP(mean): --  RR: 17 (02-22-24 @ 08:46) (17 - 18)  SpO2: --    Orthostatic VS  02-22-24 @ 08:46  Lying BP: --/-- HR: --  Sitting BP: 132/65 HR: 86  Standing BP: 127/76 HR: 104  Site: --  Mode: electronic  Orthostatic VS  02-21-24 @ 19:06  Lying BP: --/-- HR: --  Sitting BP: 126/68 HR: 98  Standing BP: 112/79 HR: 112  Site: --  Mode: --  Orthostatic VS  02-20-24 @ 19:15  Lying BP: --/-- HR: --  Sitting BP: 128/67 HR: 82  Standing BP: 132/80 HR: 83  Site: --  Mode: --  Orthostatic VS  02-20-24 @ 19:14  Lying BP: --/-- HR: --  Sitting BP: 128/67 HR: 82  Standing BP: 132/80 HR: 83  Site: --  Mode: --    Lipid Panel:

## 2024-02-21 NOTE — BH INPATIENT PSYCHIATRY PROGRESS NOTE - NSBHCHARTREVIEWVS_PSY_A_CORE FT
Vital Signs Last 24 Hrs  T(C): 36.4 (02-20-24 @ 19:15), Max: 36.4 (02-20-24 @ 19:14)  T(F): 97.6 (02-20-24 @ 19:15), Max: 97.6 (02-20-24 @ 19:14)  HR: --  BP: --  BP(mean): --  RR: 18 (02-20-24 @ 22:50) (18 - 18)  SpO2: --    Orthostatic VS  02-20-24 @ 19:15  Lying BP: --/-- HR: --  Sitting BP: 128/67 HR: 82  Standing BP: 132/80 HR: 83  Site: --  Mode: --  Orthostatic VS  02-20-24 @ 19:14  Lying BP: --/-- HR: --  Sitting BP: 128/67 HR: 82  Standing BP: 132/80 HR: 83  Site: --  Mode: --  Orthostatic VS  02-20-24 @ 08:39  Lying BP: --/-- HR: --  Sitting BP: 104/73 HR: 71  Standing BP: 113/86 HR: 102  Site: --  Mode: --  Orthostatic VS  02-19-24 @ 19:17  Lying BP: --/-- HR: --  Sitting BP: 132/78 HR: 97  Standing BP: 126/71 HR: 108  Site: --  Mode: --   Vital Signs Last 24 Hrs  T(C): 36.4 (02-22-24 @ 08:46), Max: 36.7 (02-21-24 @ 19:06)  T(F): 97.5 (02-22-24 @ 08:46), Max: 98.1 (02-21-24 @ 19:06)  HR: --  BP: --  BP(mean): --  RR: 17 (02-22-24 @ 08:46) (17 - 18)  SpO2: --    Orthostatic VS  02-22-24 @ 08:46  Lying BP: --/-- HR: --  Sitting BP: 132/65 HR: 86  Standing BP: 127/76 HR: 104  Site: --  Mode: electronic  Orthostatic VS  02-21-24 @ 19:06  Lying BP: --/-- HR: --  Sitting BP: 126/68 HR: 98  Standing BP: 112/79 HR: 112  Site: --  Mode: --  Orthostatic VS  02-20-24 @ 19:15  Lying BP: --/-- HR: --  Sitting BP: 128/67 HR: 82  Standing BP: 132/80 HR: 83  Site: --  Mode: --  Orthostatic VS  02-20-24 @ 19:14  Lying BP: --/-- HR: --  Sitting BP: 128/67 HR: 82  Standing BP: 132/80 HR: 83  Site: --  Mode: --

## 2024-02-22 PROCEDURE — 99232 SBSQ HOSP IP/OBS MODERATE 35: CPT

## 2024-02-22 RX ORDER — LANOLIN ALCOHOL/MO/W.PET/CERES
5 CREAM (GRAM) TOPICAL AT BEDTIME
Refills: 0 | Status: DISCONTINUED | OUTPATIENT
Start: 2024-02-22 | End: 2024-03-01

## 2024-02-22 RX ORDER — PALIPERIDONE 1.5 MG/1
234 TABLET, EXTENDED RELEASE ORAL ONCE
Refills: 0 | Status: COMPLETED | OUTPATIENT
Start: 2024-02-23 | End: 2024-02-23

## 2024-02-22 RX ADMIN — Medication 5 MILLIGRAM(S): at 20:10

## 2024-02-22 RX ADMIN — RISPERIDONE 2 MILLIGRAM(S): 4 TABLET ORAL at 20:09

## 2024-02-22 RX ADMIN — Medication 50 MILLIGRAM(S): at 20:10

## 2024-02-22 RX ADMIN — RISPERIDONE 2 MILLIGRAM(S): 4 TABLET ORAL at 08:43

## 2024-02-22 RX ADMIN — HALOPERIDOL DECANOATE 5 MILLIGRAM(S): 100 INJECTION INTRAMUSCULAR at 20:09

## 2024-02-22 RX ADMIN — Medication 2 MILLIGRAM(S): at 20:09

## 2024-02-22 NOTE — BH INPATIENT PSYCHIATRY PROGRESS NOTE - NSBHMETABOLIC_PSY_ALL_CORE_FT
BMI: BMI (kg/m2): 27.8 (02-10-24 @ 05:52)  HbA1c:   Glucose:   BP: --Vital Signs Last 24 Hrs  T(C): 36.4 (02-22-24 @ 08:46), Max: 36.7 (02-21-24 @ 19:06)  T(F): 97.5 (02-22-24 @ 08:46), Max: 98.1 (02-21-24 @ 19:06)  HR: --  BP: --  BP(mean): --  RR: 17 (02-22-24 @ 08:46) (17 - 18)  SpO2: --    Orthostatic VS  02-22-24 @ 08:46  Lying BP: --/-- HR: --  Sitting BP: 132/65 HR: 86  Standing BP: 127/76 HR: 104  Site: --  Mode: electronic  Orthostatic VS  02-21-24 @ 19:06  Lying BP: --/-- HR: --  Sitting BP: 126/68 HR: 98  Standing BP: 112/79 HR: 112  Site: --  Mode: --  Orthostatic VS  02-20-24 @ 19:15  Lying BP: --/-- HR: --  Sitting BP: 128/67 HR: 82  Standing BP: 132/80 HR: 83  Site: --  Mode: --  Orthostatic VS  02-20-24 @ 19:14  Lying BP: --/-- HR: --  Sitting BP: 128/67 HR: 82  Standing BP: 132/80 HR: 83  Site: --  Mode: --    Lipid Panel:  BMI: BMI (kg/m2): 27.8 (02-10-24 @ 05:52)  HbA1c:   Glucose:   BP: --Vital Signs Last 24 Hrs  T(C): 36.2 (02-23-24 @ 08:08), Max: 36.5 (02-22-24 @ 20:15)  T(F): 97.1 (02-23-24 @ 08:08), Max: 97.7 (02-22-24 @ 20:15)  HR: --  BP: --  BP(mean): --  RR: --  SpO2: --    Orthostatic VS  02-23-24 @ 08:08  Lying BP: --/-- HR: --  Sitting BP: 122/71 HR: 92  Standing BP: 116/69 HR: 86  Site: --  Mode: --  Orthostatic VS  02-22-24 @ 20:15  Lying BP: --/-- HR: --  Sitting BP: 119/63 HR: 72  Standing BP: 121/69 HR: 83  Site: --  Mode: --  Orthostatic VS  02-22-24 @ 08:46  Lying BP: --/-- HR: --  Sitting BP: 132/65 HR: 86  Standing BP: 127/76 HR: 104  Site: --  Mode: electronic  Orthostatic VS  02-21-24 @ 19:06  Lying BP: --/-- HR: --  Sitting BP: 126/68 HR: 98  Standing BP: 112/79 HR: 112  Site: --  Mode: --    Lipid Panel:

## 2024-02-22 NOTE — BH INPATIENT PSYCHIATRY PROGRESS NOTE - NSBHCHARTREVIEWVS_PSY_A_CORE FT
Vital Signs Last 24 Hrs  T(C): 36.4 (02-22-24 @ 08:46), Max: 36.7 (02-21-24 @ 19:06)  T(F): 97.5 (02-22-24 @ 08:46), Max: 98.1 (02-21-24 @ 19:06)  HR: --  BP: --  BP(mean): --  RR: 17 (02-22-24 @ 08:46) (17 - 18)  SpO2: --    Orthostatic VS  02-22-24 @ 08:46  Lying BP: --/-- HR: --  Sitting BP: 132/65 HR: 86  Standing BP: 127/76 HR: 104  Site: --  Mode: electronic  Orthostatic VS  02-21-24 @ 19:06  Lying BP: --/-- HR: --  Sitting BP: 126/68 HR: 98  Standing BP: 112/79 HR: 112  Site: --  Mode: --  Orthostatic VS  02-20-24 @ 19:15  Lying BP: --/-- HR: --  Sitting BP: 128/67 HR: 82  Standing BP: 132/80 HR: 83  Site: --  Mode: --  Orthostatic VS  02-20-24 @ 19:14  Lying BP: --/-- HR: --  Sitting BP: 128/67 HR: 82  Standing BP: 132/80 HR: 83  Site: --  Mode: --   Vital Signs Last 24 Hrs  T(C): 36.2 (02-23-24 @ 08:08), Max: 36.5 (02-22-24 @ 20:15)  T(F): 97.1 (02-23-24 @ 08:08), Max: 97.7 (02-22-24 @ 20:15)  HR: --  BP: --  BP(mean): --  RR: --  SpO2: --    Orthostatic VS  02-23-24 @ 08:08  Lying BP: --/-- HR: --  Sitting BP: 122/71 HR: 92  Standing BP: 116/69 HR: 86  Site: --  Mode: --  Orthostatic VS  02-22-24 @ 20:15  Lying BP: --/-- HR: --  Sitting BP: 119/63 HR: 72  Standing BP: 121/69 HR: 83  Site: --  Mode: --  Orthostatic VS  02-22-24 @ 08:46  Lying BP: --/-- HR: --  Sitting BP: 132/65 HR: 86  Standing BP: 127/76 HR: 104  Site: --  Mode: electronic  Orthostatic VS  02-21-24 @ 19:06  Lying BP: --/-- HR: --  Sitting BP: 126/68 HR: 98  Standing BP: 112/79 HR: 112  Site: --  Mode: --

## 2024-02-22 NOTE — BH INPATIENT PSYCHIATRY PROGRESS NOTE - PRN MEDS
MEDICATIONS  (PRN):  diphenhydrAMINE 50 milliGRAM(s) Oral every 6 hours PRN agitation/eps prophylaxis  diphenhydrAMINE Injectable 50 milliGRAM(s) IntraMuscular once PRN severe agitation/eps prophylaxis  haloperidol     Tablet 5 milliGRAM(s) Oral every 6 hours PRN agitation  haloperidol    Injectable 5 milliGRAM(s) IntraMuscular once PRN severe agitation  LORazepam     Tablet 2 milliGRAM(s) Oral every 6 hours PRN agitation  LORazepam   Injectable 2 milliGRAM(s) IntraMuscular once PRN psychotic agitation  traZODone 50 milliGRAM(s) Oral at bedtime PRN insomnia

## 2024-02-23 PROCEDURE — 99232 SBSQ HOSP IP/OBS MODERATE 35: CPT

## 2024-02-23 RX ADMIN — RISPERIDONE 2 MILLIGRAM(S): 4 TABLET ORAL at 09:36

## 2024-02-23 RX ADMIN — HALOPERIDOL DECANOATE 5 MILLIGRAM(S): 100 INJECTION INTRAMUSCULAR at 20:02

## 2024-02-23 RX ADMIN — Medication 50 MILLIGRAM(S): at 20:01

## 2024-02-23 RX ADMIN — PALIPERIDONE 234 MILLIGRAM(S): 1.5 TABLET, EXTENDED RELEASE ORAL at 09:38

## 2024-02-23 RX ADMIN — RISPERIDONE 2 MILLIGRAM(S): 4 TABLET ORAL at 20:01

## 2024-02-23 RX ADMIN — Medication 2 MILLIGRAM(S): at 20:02

## 2024-02-23 RX ADMIN — Medication 5 MILLIGRAM(S): at 20:01

## 2024-02-23 RX ADMIN — Medication 50 MILLIGRAM(S): at 20:02

## 2024-02-23 NOTE — BH INPATIENT PSYCHIATRY PROGRESS NOTE - NSBHCHARTREVIEWVS_PSY_A_CORE FT
Vital Signs Last 24 Hrs  T(C): 36.2 (02-23-24 @ 08:08), Max: 36.5 (02-22-24 @ 20:15)  T(F): 97.1 (02-23-24 @ 08:08), Max: 97.7 (02-22-24 @ 20:15)  HR: --  BP: --  BP(mean): --  RR: 16 (02-23-24 @ 08:08) (16 - 16)  SpO2: --    Orthostatic VS  02-23-24 @ 08:08  Lying BP: --/-- HR: --  Sitting BP: 122/71 HR: 92  Standing BP: 116/69 HR: 86  Site: --  Mode: --  Orthostatic VS  02-22-24 @ 20:15  Lying BP: --/-- HR: --  Sitting BP: 119/63 HR: 72  Standing BP: 121/69 HR: 83  Site: --  Mode: --  Orthostatic VS  02-22-24 @ 08:46  Lying BP: --/-- HR: --  Sitting BP: 132/65 HR: 86  Standing BP: 127/76 HR: 104  Site: --  Mode: electronic  Orthostatic VS  02-21-24 @ 19:06  Lying BP: --/-- HR: --  Sitting BP: 126/68 HR: 98  Standing BP: 112/79 HR: 112  Site: --  Mode: --

## 2024-02-23 NOTE — BH INPATIENT PSYCHIATRY PROGRESS NOTE - NSBHMETABOLIC_PSY_ALL_CORE_FT
BMI: BMI (kg/m2): 27.8 (02-10-24 @ 05:52)  HbA1c:   Glucose:   BP: --Vital Signs Last 24 Hrs  T(C): 36.2 (02-23-24 @ 08:08), Max: 36.5 (02-22-24 @ 20:15)  T(F): 97.1 (02-23-24 @ 08:08), Max: 97.7 (02-22-24 @ 20:15)  HR: --  BP: --  BP(mean): --  RR: 16 (02-23-24 @ 08:08) (16 - 16)  SpO2: --    Orthostatic VS  02-23-24 @ 08:08  Lying BP: --/-- HR: --  Sitting BP: 122/71 HR: 92  Standing BP: 116/69 HR: 86  Site: --  Mode: --  Orthostatic VS  02-22-24 @ 20:15  Lying BP: --/-- HR: --  Sitting BP: 119/63 HR: 72  Standing BP: 121/69 HR: 83  Site: --  Mode: --  Orthostatic VS  02-22-24 @ 08:46  Lying BP: --/-- HR: --  Sitting BP: 132/65 HR: 86  Standing BP: 127/76 HR: 104  Site: --  Mode: electronic  Orthostatic VS  02-21-24 @ 19:06  Lying BP: --/-- HR: --  Sitting BP: 126/68 HR: 98  Standing BP: 112/79 HR: 112  Site: --  Mode: --    Lipid Panel:

## 2024-02-23 NOTE — BH INPATIENT PSYCHIATRY PROGRESS NOTE - CURRENT MEDICATION
MEDICATIONS  (STANDING):  melatonin. 5 milliGRAM(s) Oral at bedtime  risperiDONE   Tablet 2 milliGRAM(s) Oral two times a day    MEDICATIONS  (PRN):  diphenhydrAMINE 50 milliGRAM(s) Oral every 6 hours PRN agitation/eps prophylaxis  diphenhydrAMINE Injectable 50 milliGRAM(s) IntraMuscular once PRN severe agitation/eps prophylaxis  haloperidol     Tablet 5 milliGRAM(s) Oral every 6 hours PRN agitation  haloperidol    Injectable 5 milliGRAM(s) IntraMuscular once PRN severe agitation  LORazepam     Tablet 2 milliGRAM(s) Oral every 6 hours PRN agitation  LORazepam   Injectable 2 milliGRAM(s) IntraMuscular once PRN psychotic agitation  traZODone 50 milliGRAM(s) Oral at bedtime PRN insomnia

## 2024-02-24 RX ADMIN — RISPERIDONE 2 MILLIGRAM(S): 4 TABLET ORAL at 20:14

## 2024-02-24 RX ADMIN — Medication 50 MILLIGRAM(S): at 20:14

## 2024-02-24 RX ADMIN — RISPERIDONE 2 MILLIGRAM(S): 4 TABLET ORAL at 08:40

## 2024-02-24 RX ADMIN — HALOPERIDOL DECANOATE 5 MILLIGRAM(S): 100 INJECTION INTRAMUSCULAR at 20:14

## 2024-02-24 RX ADMIN — Medication 5 MILLIGRAM(S): at 20:14

## 2024-02-24 RX ADMIN — Medication 2 MILLIGRAM(S): at 20:14

## 2024-02-25 RX ADMIN — HALOPERIDOL DECANOATE 5 MILLIGRAM(S): 100 INJECTION INTRAMUSCULAR at 19:35

## 2024-02-25 RX ADMIN — Medication 50 MILLIGRAM(S): at 19:35

## 2024-02-25 RX ADMIN — Medication 2 MILLIGRAM(S): at 19:35

## 2024-02-25 RX ADMIN — Medication 5 MILLIGRAM(S): at 20:03

## 2024-02-25 RX ADMIN — RISPERIDONE 2 MILLIGRAM(S): 4 TABLET ORAL at 08:18

## 2024-02-25 RX ADMIN — RISPERIDONE 2 MILLIGRAM(S): 4 TABLET ORAL at 20:03

## 2024-02-26 LAB
A1C WITH ESTIMATED AVERAGE GLUCOSE RESULT: 5.6 % — SIGNIFICANT CHANGE UP (ref 4–5.6)
CHOLEST SERPL-MCNC: 149 MG/DL — SIGNIFICANT CHANGE UP
ESTIMATED AVERAGE GLUCOSE: 114 — SIGNIFICANT CHANGE UP
HDLC SERPL-MCNC: 71 MG/DL — SIGNIFICANT CHANGE UP
LIPID PNL WITH DIRECT LDL SERPL: 68 MG/DL — SIGNIFICANT CHANGE UP
NON HDL CHOLESTEROL: 78 MG/DL — SIGNIFICANT CHANGE UP
TRIGL SERPL-MCNC: 52 MG/DL — SIGNIFICANT CHANGE UP

## 2024-02-26 PROCEDURE — 99232 SBSQ HOSP IP/OBS MODERATE 35: CPT

## 2024-02-26 PROCEDURE — 90853 GROUP PSYCHOTHERAPY: CPT

## 2024-02-26 RX ADMIN — RISPERIDONE 2 MILLIGRAM(S): 4 TABLET ORAL at 20:09

## 2024-02-26 RX ADMIN — RISPERIDONE 2 MILLIGRAM(S): 4 TABLET ORAL at 08:05

## 2024-02-26 RX ADMIN — Medication 2 MILLIGRAM(S): at 19:24

## 2024-02-26 RX ADMIN — Medication 5 MILLIGRAM(S): at 20:09

## 2024-02-26 RX ADMIN — Medication 50 MILLIGRAM(S): at 19:24

## 2024-02-26 RX ADMIN — Medication 50 MILLIGRAM(S): at 20:08

## 2024-02-26 RX ADMIN — HALOPERIDOL DECANOATE 5 MILLIGRAM(S): 100 INJECTION INTRAMUSCULAR at 19:24

## 2024-02-26 NOTE — BH INPATIENT PSYCHIATRY PROGRESS NOTE - NSBHCHARTREVIEWVS_PSY_A_CORE FT
Vital Signs Last 24 Hrs  T(C): 36.9 (02-26-24 @ 08:41), Max: 36.9 (02-26-24 @ 08:41)  T(F): 98.5 (02-26-24 @ 08:41), Max: 98.5 (02-26-24 @ 08:41)  HR: --  BP: --  BP(mean): --  RR: 16 (02-26-24 @ 08:41) (16 - 16)  SpO2: --    Orthostatic VS  02-26-24 @ 08:41  Lying BP: --/-- HR: --  Sitting BP: 107/80 HR: 90  Standing BP: 121/67 HR: 104  Site: --  Mode: --  Orthostatic VS  02-25-24 @ 18:56  Lying BP: --/-- HR: --  Sitting BP: 119/67 HR: 107  Standing BP: 111/64 HR: 118  Site: --  Mode: --  Orthostatic VS  02-25-24 @ 08:43  Lying BP: --/-- HR: --  Sitting BP: 116/58 HR: 95  Standing BP: 118/73 HR: 106  Site: --  Mode: --  Orthostatic VS  02-24-24 @ 19:17  Lying BP: --/-- HR: --  Sitting BP: 127/70 HR: 102  Standing BP: 128/85 HR: 109  Site: --  Mode: --   Vital Signs Last 24 Hrs  T(C): 36.8 (02-26-24 @ 19:19), Max: 36.8 (02-26-24 @ 19:19)  T(F): 98.2 (02-26-24 @ 19:19), Max: 98.2 (02-26-24 @ 19:19)  HR: --  BP: --  BP(mean): --  RR: 16 (02-27-24 @ 07:53) (16 - 16)  SpO2: --    Orthostatic VS  02-26-24 @ 19:19  Lying BP: --/-- HR: --  Sitting BP: 125/76 HR: 72  Standing BP: 120/77 HR: 90  Site: --  Mode: --  Orthostatic VS  02-26-24 @ 08:41  Lying BP: --/-- HR: --  Sitting BP: 107/80 HR: 90  Standing BP: 121/67 HR: 104  Site: --  Mode: --  Orthostatic VS  02-25-24 @ 18:56  Lying BP: --/-- HR: --  Sitting BP: 119/67 HR: 107  Standing BP: 111/64 HR: 118  Site: --  Mode: --

## 2024-02-26 NOTE — BH INPATIENT PSYCHIATRY PROGRESS NOTE - NSBHMETABOLIC_PSY_ALL_CORE_FT
BMI: BMI (kg/m2): 27.8 (02-10-24 @ 05:52)  HbA1c: A1C with Estimated Average Glucose Result: 5.6 % (02-26-24 @ 09:05)    Glucose:   BP: --Vital Signs Last 24 Hrs  T(C): 36.9 (02-26-24 @ 08:41), Max: 36.9 (02-26-24 @ 08:41)  T(F): 98.5 (02-26-24 @ 08:41), Max: 98.5 (02-26-24 @ 08:41)  HR: --  BP: --  BP(mean): --  RR: 16 (02-26-24 @ 08:41) (16 - 16)  SpO2: --    Orthostatic VS  02-26-24 @ 08:41  Lying BP: --/-- HR: --  Sitting BP: 107/80 HR: 90  Standing BP: 121/67 HR: 104  Site: --  Mode: --  Orthostatic VS  02-25-24 @ 18:56  Lying BP: --/-- HR: --  Sitting BP: 119/67 HR: 107  Standing BP: 111/64 HR: 118  Site: --  Mode: --  Orthostatic VS  02-25-24 @ 08:43  Lying BP: --/-- HR: --  Sitting BP: 116/58 HR: 95  Standing BP: 118/73 HR: 106  Site: --  Mode: --  Orthostatic VS  02-24-24 @ 19:17  Lying BP: --/-- HR: --  Sitting BP: 127/70 HR: 102  Standing BP: 128/85 HR: 109  Site: --  Mode: --    Lipid Panel: Date/Time: 02-26-24 @ 09:05  Cholesterol, Serum: 149  LDL Cholesterol Calculated: 68  HDL Cholesterol, Serum: 71  Total Cholesterol/HDL Ration Measurement: --  Triglycerides, Serum: 52   BMI: BMI (kg/m2): 27.8 (02-10-24 @ 05:52)  HbA1c: A1C with Estimated Average Glucose Result: 5.6 % (02-26-24 @ 09:05)    Glucose:   BP: --Vital Signs Last 24 Hrs  T(C): 36.8 (02-26-24 @ 19:19), Max: 36.8 (02-26-24 @ 19:19)  T(F): 98.2 (02-26-24 @ 19:19), Max: 98.2 (02-26-24 @ 19:19)  HR: --  BP: --  BP(mean): --  RR: 16 (02-27-24 @ 07:53) (16 - 16)  SpO2: --    Orthostatic VS  02-26-24 @ 19:19  Lying BP: --/-- HR: --  Sitting BP: 125/76 HR: 72  Standing BP: 120/77 HR: 90  Site: --  Mode: --  Orthostatic VS  02-26-24 @ 08:41  Lying BP: --/-- HR: --  Sitting BP: 107/80 HR: 90  Standing BP: 121/67 HR: 104  Site: --  Mode: --  Orthostatic VS  02-25-24 @ 18:56  Lying BP: --/-- HR: --  Sitting BP: 119/67 HR: 107  Standing BP: 111/64 HR: 118  Site: --  Mode: --    Lipid Panel: Date/Time: 02-26-24 @ 09:05  Cholesterol, Serum: 149  LDL Cholesterol Calculated: 68  HDL Cholesterol, Serum: 71  Total Cholesterol/HDL Ration Measurement: --  Triglycerides, Serum: 52

## 2024-02-27 PROCEDURE — 99232 SBSQ HOSP IP/OBS MODERATE 35: CPT

## 2024-02-27 RX ORDER — PALIPERIDONE 1.5 MG/1
156 TABLET, EXTENDED RELEASE ORAL ONCE
Refills: 0 | Status: COMPLETED | OUTPATIENT
Start: 2024-03-01 | End: 2024-03-01

## 2024-02-27 RX ADMIN — RISPERIDONE 2 MILLIGRAM(S): 4 TABLET ORAL at 20:09

## 2024-02-27 RX ADMIN — Medication 5 MILLIGRAM(S): at 20:08

## 2024-02-27 RX ADMIN — RISPERIDONE 2 MILLIGRAM(S): 4 TABLET ORAL at 08:42

## 2024-02-27 RX ADMIN — Medication 50 MILLIGRAM(S): at 20:08

## 2024-02-27 RX ADMIN — HALOPERIDOL DECANOATE 5 MILLIGRAM(S): 100 INJECTION INTRAMUSCULAR at 20:08

## 2024-02-27 RX ADMIN — Medication 2 MILLIGRAM(S): at 20:08

## 2024-02-27 NOTE — BH INPATIENT PSYCHIATRY PROGRESS NOTE - NSBHMETABOLIC_PSY_ALL_CORE_FT
BMI: BMI (kg/m2): 27.8 (02-10-24 @ 05:52)  HbA1c: A1C with Estimated Average Glucose Result: 5.6 % (02-26-24 @ 09:05)    Glucose:   BP: --Vital Signs Last 24 Hrs  T(C): 36.1 (02-27-24 @ 14:11), Max: 36.8 (02-26-24 @ 19:19)  T(F): 97 (02-27-24 @ 14:11), Max: 98.2 (02-26-24 @ 19:19)  HR: --  BP: --  BP(mean): --  RR: 16 (02-27-24 @ 14:11) (16 - 16)  SpO2: --    Orthostatic VS  02-27-24 @ 14:11  Lying BP: --/-- HR: --  Sitting BP: 94/66 HR: 118  Standing BP: 99/61 HR: 119  Site: --  Mode: --  Orthostatic VS  02-26-24 @ 19:19  Lying BP: --/-- HR: --  Sitting BP: 125/76 HR: 72  Standing BP: 120/77 HR: 90  Site: --  Mode: --  Orthostatic VS  02-26-24 @ 08:41  Lying BP: --/-- HR: --  Sitting BP: 107/80 HR: 90  Standing BP: 121/67 HR: 104  Site: --  Mode: --  Orthostatic VS  02-25-24 @ 18:56  Lying BP: --/-- HR: --  Sitting BP: 119/67 HR: 107  Standing BP: 111/64 HR: 118  Site: --  Mode: --    Lipid Panel: Date/Time: 02-26-24 @ 09:05  Cholesterol, Serum: 149  LDL Cholesterol Calculated: 68  HDL Cholesterol, Serum: 71  Total Cholesterol/HDL Ration Measurement: --  Triglycerides, Serum: 52   BMI: BMI (kg/m2): 27.8 (02-10-24 @ 05:52)  HbA1c: A1C with Estimated Average Glucose Result: 5.6 % (02-26-24 @ 09:05)    Glucose:   BP: --Vital Signs Last 24 Hrs  T(C): 36.1 (02-27-24 @ 19:15), Max: 36.1 (02-27-24 @ 14:11)  T(F): 97 (02-27-24 @ 19:15), Max: 97 (02-27-24 @ 14:11)  HR: --  BP: --  BP(mean): --  RR: 16 (02-27-24 @ 14:11) (16 - 16)  SpO2: --    Orthostatic VS  02-27-24 @ 19:15  Lying BP: --/-- HR: --  Sitting BP: 94/66 HR: 118  Standing BP: 99/61 HR: 123  Site: --  Mode: --  Orthostatic VS  02-27-24 @ 14:11  Lying BP: --/-- HR: --  Sitting BP: 94/66 HR: 118  Standing BP: 99/61 HR: 119  Site: --  Mode: --  Orthostatic VS  02-26-24 @ 19:19  Lying BP: --/-- HR: --  Sitting BP: 125/76 HR: 72  Standing BP: 120/77 HR: 90  Site: --  Mode: --    Lipid Panel: Date/Time: 02-26-24 @ 09:05  Cholesterol, Serum: 149  LDL Cholesterol Calculated: 68  HDL Cholesterol, Serum: 71  Total Cholesterol/HDL Ration Measurement: --  Triglycerides, Serum: 52

## 2024-02-27 NOTE — BH CHART NOTE - NSPSYPRGNOTEFT_PSY_ALL_CORE
Writer tried to meet with patient for psychotherapy session. Patient was asleep when writer asked to speak, patient agreed to talk, however then was not fully answering writers questions and stated that he did not want to speak right now because he wanted to sleep more. Patient agreed to possibly speak with writer tomorrow.
Writer attempted to meet with patient. Patient agreed to meet, however stated that he does "not want to discuss that" for any questions the writer asked apart from how he slept and his apatite.

## 2024-02-27 NOTE — BH INPATIENT PSYCHIATRY PROGRESS NOTE - NSBHCHARTREVIEWVS_PSY_A_CORE FT
Vital Signs Last 24 Hrs  T(C): 36.1 (02-27-24 @ 14:11), Max: 36.8 (02-26-24 @ 19:19)  T(F): 97 (02-27-24 @ 14:11), Max: 98.2 (02-26-24 @ 19:19)  HR: --  BP: --  BP(mean): --  RR: 16 (02-27-24 @ 14:11) (16 - 16)  SpO2: --    Orthostatic VS  02-27-24 @ 14:11  Lying BP: --/-- HR: --  Sitting BP: 94/66 HR: 118  Standing BP: 99/61 HR: 119  Site: --  Mode: --  Orthostatic VS  02-26-24 @ 19:19  Lying BP: --/-- HR: --  Sitting BP: 125/76 HR: 72  Standing BP: 120/77 HR: 90  Site: --  Mode: --  Orthostatic VS  02-26-24 @ 08:41  Lying BP: --/-- HR: --  Sitting BP: 107/80 HR: 90  Standing BP: 121/67 HR: 104  Site: --  Mode: --  Orthostatic VS  02-25-24 @ 18:56  Lying BP: --/-- HR: --  Sitting BP: 119/67 HR: 107  Standing BP: 111/64 HR: 118  Site: --  Mode: --   Vital Signs Last 24 Hrs  T(C): 36.1 (02-27-24 @ 19:15), Max: 36.1 (02-27-24 @ 14:11)  T(F): 97 (02-27-24 @ 19:15), Max: 97 (02-27-24 @ 14:11)  HR: --  BP: --  BP(mean): --  RR: 16 (02-27-24 @ 14:11) (16 - 16)  SpO2: --    Orthostatic VS  02-27-24 @ 19:15  Lying BP: --/-- HR: --  Sitting BP: 94/66 HR: 118  Standing BP: 99/61 HR: 123  Site: --  Mode: --  Orthostatic VS  02-27-24 @ 14:11  Lying BP: --/-- HR: --  Sitting BP: 94/66 HR: 118  Standing BP: 99/61 HR: 119  Site: --  Mode: --  Orthostatic VS  02-26-24 @ 19:19  Lying BP: --/-- HR: --  Sitting BP: 125/76 HR: 72  Standing BP: 120/77 HR: 90  Site: --  Mode: --

## 2024-02-28 PROCEDURE — 99232 SBSQ HOSP IP/OBS MODERATE 35: CPT

## 2024-02-28 RX ADMIN — Medication 5 MILLIGRAM(S): at 20:13

## 2024-02-28 RX ADMIN — RISPERIDONE 2 MILLIGRAM(S): 4 TABLET ORAL at 09:16

## 2024-02-28 RX ADMIN — RISPERIDONE 2 MILLIGRAM(S): 4 TABLET ORAL at 20:12

## 2024-02-28 RX ADMIN — Medication 50 MILLIGRAM(S): at 20:13

## 2024-02-28 RX ADMIN — Medication 2 MILLIGRAM(S): at 20:13

## 2024-02-28 RX ADMIN — Medication 50 MILLIGRAM(S): at 20:14

## 2024-02-28 RX ADMIN — HALOPERIDOL DECANOATE 5 MILLIGRAM(S): 100 INJECTION INTRAMUSCULAR at 20:13

## 2024-02-28 NOTE — BH INPATIENT PSYCHIATRY PROGRESS NOTE - NSBHMETABOLIC_PSY_ALL_CORE_FT
BMI: BMI (kg/m2): 27.8 (02-10-24 @ 05:52)  HbA1c: A1C with Estimated Average Glucose Result: 5.6 % (02-26-24 @ 09:05)    Glucose:   BP: --Vital Signs Last 24 Hrs  T(C): 36.1 (02-27-24 @ 19:15), Max: 36.1 (02-27-24 @ 19:15)  T(F): 97 (02-27-24 @ 19:15), Max: 97 (02-27-24 @ 19:15)  HR: --  BP: --  BP(mean): --  RR: --  SpO2: --    Orthostatic VS  02-27-24 @ 19:15  Lying BP: --/-- HR: --  Sitting BP: 94/66 HR: 118  Standing BP: 99/61 HR: 123  Site: --  Mode: --  Orthostatic VS  02-27-24 @ 14:11  Lying BP: --/-- HR: --  Sitting BP: 94/66 HR: 118  Standing BP: 99/61 HR: 119  Site: --  Mode: --  Orthostatic VS  02-26-24 @ 19:19  Lying BP: --/-- HR: --  Sitting BP: 125/76 HR: 72  Standing BP: 120/77 HR: 90  Site: --  Mode: --    Lipid Panel: Date/Time: 02-26-24 @ 09:05  Cholesterol, Serum: 149  LDL Cholesterol Calculated: 68  HDL Cholesterol, Serum: 71  Total Cholesterol/HDL Ration Measurement: --  Triglycerides, Serum: 52   BMI: BMI (kg/m2): 27.8 (02-10-24 @ 05:52)  HbA1c: A1C with Estimated Average Glucose Result: 5.6 % (02-26-24 @ 09:05)    Glucose:   BP: --Vital Signs Last 24 Hrs  T(C): 36.4 (02-29-24 @ 09:01), Max: 36.6 (02-28-24 @ 19:17)  T(F): 97.6 (02-29-24 @ 09:01), Max: 97.9 (02-28-24 @ 19:17)  HR: --  BP: --  BP(mean): --  RR: 18 (02-28-24 @ 21:32) (18 - 18)  SpO2: --    Orthostatic VS  02-29-24 @ 09:01  Lying BP: --/-- HR: --  Sitting BP: 123/76 HR: 95  Standing BP: 98/73 HR: 108  Site: --  Mode: --  Orthostatic VS  02-28-24 @ 19:17  Lying BP: --/-- HR: --  Sitting BP: 119/69 HR: 107  Standing BP: 116/66 HR: 105  Site: --  Mode: --  Orthostatic VS  02-27-24 @ 19:15  Lying BP: --/-- HR: --  Sitting BP: 94/66 HR: 118  Standing BP: 99/61 HR: 123  Site: --  Mode: --  Orthostatic VS  02-27-24 @ 14:11  Lying BP: --/-- HR: --  Sitting BP: 94/66 HR: 118  Standing BP: 99/61 HR: 119  Site: --  Mode: --    Lipid Panel: Date/Time: 02-26-24 @ 09:05  Cholesterol, Serum: 149  LDL Cholesterol Calculated: 68  HDL Cholesterol, Serum: 71  Total Cholesterol/HDL Ration Measurement: --  Triglycerides, Serum: 52

## 2024-02-28 NOTE — BH INPATIENT PSYCHIATRY PROGRESS NOTE - NSBHCHARTREVIEWVS_PSY_A_CORE FT
Vital Signs Last 24 Hrs  T(C): 36.1 (02-27-24 @ 19:15), Max: 36.1 (02-27-24 @ 19:15)  T(F): 97 (02-27-24 @ 19:15), Max: 97 (02-27-24 @ 19:15)  HR: --  BP: --  BP(mean): --  RR: --  SpO2: --    Orthostatic VS  02-27-24 @ 19:15  Lying BP: --/-- HR: --  Sitting BP: 94/66 HR: 118  Standing BP: 99/61 HR: 123  Site: --  Mode: --  Orthostatic VS  02-27-24 @ 14:11  Lying BP: --/-- HR: --  Sitting BP: 94/66 HR: 118  Standing BP: 99/61 HR: 119  Site: --  Mode: --  Orthostatic VS  02-26-24 @ 19:19  Lying BP: --/-- HR: --  Sitting BP: 125/76 HR: 72  Standing BP: 120/77 HR: 90  Site: --  Mode: --   Vital Signs Last 24 Hrs  T(C): 36.4 (02-29-24 @ 09:01), Max: 36.6 (02-28-24 @ 19:17)  T(F): 97.6 (02-29-24 @ 09:01), Max: 97.9 (02-28-24 @ 19:17)  HR: --  BP: --  BP(mean): --  RR: 18 (02-28-24 @ 21:32) (18 - 18)  SpO2: --    Orthostatic VS  02-29-24 @ 09:01  Lying BP: --/-- HR: --  Sitting BP: 123/76 HR: 95  Standing BP: 98/73 HR: 108  Site: --  Mode: --  Orthostatic VS  02-28-24 @ 19:17  Lying BP: --/-- HR: --  Sitting BP: 119/69 HR: 107  Standing BP: 116/66 HR: 105  Site: --  Mode: --  Orthostatic VS  02-27-24 @ 19:15  Lying BP: --/-- HR: --  Sitting BP: 94/66 HR: 118  Standing BP: 99/61 HR: 123  Site: --  Mode: --  Orthostatic VS  02-27-24 @ 14:11  Lying BP: --/-- HR: --  Sitting BP: 94/66 HR: 118  Standing BP: 99/61 HR: 119  Site: --  Mode: --

## 2024-02-29 LAB
ALBUMIN SERPL ELPH-MCNC: 4.4 G/DL — SIGNIFICANT CHANGE UP (ref 3.3–5)
ALP SERPL-CCNC: 60 U/L — SIGNIFICANT CHANGE UP (ref 40–120)
ALT FLD-CCNC: 34 U/L — SIGNIFICANT CHANGE UP (ref 4–41)
ANION GAP SERPL CALC-SCNC: 14 MMOL/L — SIGNIFICANT CHANGE UP (ref 7–14)
AST SERPL-CCNC: 17 U/L — SIGNIFICANT CHANGE UP (ref 4–40)
BASOPHILS # BLD AUTO: 0.02 K/UL — SIGNIFICANT CHANGE UP (ref 0–0.2)
BASOPHILS NFR BLD AUTO: 0.3 % — SIGNIFICANT CHANGE UP (ref 0–2)
BILIRUB SERPL-MCNC: 0.5 MG/DL — SIGNIFICANT CHANGE UP (ref 0.2–1.2)
BUN SERPL-MCNC: 14 MG/DL — SIGNIFICANT CHANGE UP (ref 7–23)
CALCIUM SERPL-MCNC: 9.1 MG/DL — SIGNIFICANT CHANGE UP (ref 8.4–10.5)
CHLORIDE SERPL-SCNC: 103 MMOL/L — SIGNIFICANT CHANGE UP (ref 98–107)
CO2 SERPL-SCNC: 21 MMOL/L — LOW (ref 22–31)
CREAT SERPL-MCNC: 1.09 MG/DL — SIGNIFICANT CHANGE UP (ref 0.5–1.3)
EGFR: 96 ML/MIN/1.73M2 — SIGNIFICANT CHANGE UP
EOSINOPHIL # BLD AUTO: 0.13 K/UL — SIGNIFICANT CHANGE UP (ref 0–0.5)
EOSINOPHIL NFR BLD AUTO: 2 % — SIGNIFICANT CHANGE UP (ref 0–6)
GLUCOSE SERPL-MCNC: 103 MG/DL — HIGH (ref 70–99)
HCT VFR BLD CALC: 40.5 % — SIGNIFICANT CHANGE UP (ref 39–50)
HGB BLD-MCNC: 13.6 G/DL — SIGNIFICANT CHANGE UP (ref 13–17)
IANC: 3.34 K/UL — SIGNIFICANT CHANGE UP (ref 1.8–7.4)
IMM GRANULOCYTES NFR BLD AUTO: 0.2 % — SIGNIFICANT CHANGE UP (ref 0–0.9)
LYMPHOCYTES # BLD AUTO: 2.28 K/UL — SIGNIFICANT CHANGE UP (ref 1–3.3)
LYMPHOCYTES # BLD AUTO: 35.3 % — SIGNIFICANT CHANGE UP (ref 13–44)
MCHC RBC-ENTMCNC: 28.2 PG — SIGNIFICANT CHANGE UP (ref 27–34)
MCHC RBC-ENTMCNC: 33.6 GM/DL — SIGNIFICANT CHANGE UP (ref 32–36)
MCV RBC AUTO: 83.9 FL — SIGNIFICANT CHANGE UP (ref 80–100)
MONOCYTES # BLD AUTO: 0.67 K/UL — SIGNIFICANT CHANGE UP (ref 0–0.9)
MONOCYTES NFR BLD AUTO: 10.4 % — SIGNIFICANT CHANGE UP (ref 2–14)
NEUTROPHILS # BLD AUTO: 3.34 K/UL — SIGNIFICANT CHANGE UP (ref 1.8–7.4)
NEUTROPHILS NFR BLD AUTO: 51.8 % — SIGNIFICANT CHANGE UP (ref 43–77)
NRBC # BLD: 0 /100 WBCS — SIGNIFICANT CHANGE UP (ref 0–0)
NRBC # FLD: 0 K/UL — SIGNIFICANT CHANGE UP (ref 0–0)
PLATELET # BLD AUTO: 312 K/UL — SIGNIFICANT CHANGE UP (ref 150–400)
POTASSIUM SERPL-MCNC: 3.8 MMOL/L — SIGNIFICANT CHANGE UP (ref 3.5–5.3)
POTASSIUM SERPL-SCNC: 3.8 MMOL/L — SIGNIFICANT CHANGE UP (ref 3.5–5.3)
PROLACTIN SERPL-MCNC: 80.5 NG/ML — HIGH (ref 4.1–18.4)
PROT SERPL-MCNC: 7.4 G/DL — SIGNIFICANT CHANGE UP (ref 6–8.3)
RBC # BLD: 4.83 M/UL — SIGNIFICANT CHANGE UP (ref 4.2–5.8)
RBC # FLD: 13.2 % — SIGNIFICANT CHANGE UP (ref 10.3–14.5)
SODIUM SERPL-SCNC: 138 MMOL/L — SIGNIFICANT CHANGE UP (ref 135–145)
WBC # BLD: 6.45 K/UL — SIGNIFICANT CHANGE UP (ref 3.8–10.5)
WBC # FLD AUTO: 6.45 K/UL — SIGNIFICANT CHANGE UP (ref 3.8–10.5)

## 2024-02-29 PROCEDURE — 99232 SBSQ HOSP IP/OBS MODERATE 35: CPT

## 2024-02-29 RX ORDER — PALIPERIDONE 1.5 MG/1
234 TABLET, EXTENDED RELEASE ORAL
Qty: 1.5 | Refills: 0
Start: 2024-02-29

## 2024-02-29 RX ADMIN — Medication 2 MILLIGRAM(S): at 19:56

## 2024-02-29 RX ADMIN — RISPERIDONE 2 MILLIGRAM(S): 4 TABLET ORAL at 20:00

## 2024-02-29 RX ADMIN — RISPERIDONE 2 MILLIGRAM(S): 4 TABLET ORAL at 08:37

## 2024-02-29 RX ADMIN — Medication 5 MILLIGRAM(S): at 20:00

## 2024-02-29 RX ADMIN — Medication 50 MILLIGRAM(S): at 19:56

## 2024-02-29 NOTE — BH DISCHARGE NOTE NURSING/SOCIAL WORK/PSYCH REHAB - NSDCVIVACCINE_GEN_ALL_CORE_FT
Tdap; 07-Apr-2019 12:28; Andreia Del Real (RN); [employer]; [id number] (Exp. Date: 03-Dec-2020); IntraMuscular; Deltoid Left.; 0.5 milliLiter(s); VIS (VIS Published: 09-May-2013, VIS Presented: 07-Apr-2019);   Tdap; 30-Jul-2019 14:40; Yuliana Castañeda (JUAN); [employer]; [id number] (Exp. Date: 21-Apr-2026); IntraMuscular; Deltoid Left.; 0.5 milliLiter(s); VIS (VIS Published: 09-May-2013, VIS Presented: 30-Jul-2019);

## 2024-02-29 NOTE — BH INPATIENT PSYCHIATRY PROGRESS NOTE - NSICDXBHSECONDARYDX_PSY_ALL_CORE
Cannabis use disorder   F12.90  

## 2024-02-29 NOTE — BH INPATIENT PSYCHIATRY PROGRESS NOTE - NSDCCRITERIA_PSY_ALL_CORE
symptom management, safety planning, care coordination

## 2024-02-29 NOTE — BH INPATIENT PSYCHIATRY PROGRESS NOTE - NSTXIMPULSDATEEST_PSY_ALL_CORE
11-Feb-2024
21-Feb-2024
11-Feb-2024
10-Feb-2024
11-Feb-2024
10-Feb-2024
11-Feb-2024
21-Feb-2024
11-Feb-2024

## 2024-02-29 NOTE — BH INPATIENT PSYCHIATRY PROGRESS NOTE - NSBHMSEBEHAV_PSY_A_CORE
superficially cooperative, slightly irritable/Cooperative/Other

## 2024-02-29 NOTE — BH PSYCHOLOGY - GROUP THERAPY NOTE - NSPSYCHOLGRPCOGGOAL_PSY_A_CORE FT
Decrease symptoms, Develop coping/emotion regulation skills, Psychoeducation  

## 2024-02-29 NOTE — BH INPATIENT PSYCHIATRY PROGRESS NOTE - NSTXPSYCHOGOAL_PSY_ALL_CORE
Will identify 1 trigger/stressor that exacerbates thoughts

## 2024-02-29 NOTE — BH PSYCHOLOGY - GROUP THERAPY NOTE - NSPSYCHOLGRPCOGPROB_PSY_A_CORE FT
Anxiety, Depression, Emotion dysregulation, Lack of coping skills  
Depression, Emotion dysregulation, Lack of coping skills, Psycho-social stressors, Self care, Problem solving 

## 2024-02-29 NOTE — BH INPATIENT PSYCHIATRY PROGRESS NOTE - NSBHATTESTATTENDBILLTIME2_PSY_A_CORE
I have reviewed and verified the documentation.

## 2024-02-29 NOTE — BH INPATIENT PSYCHIATRY PROGRESS NOTE - NSBHCONSDANGEROTHERS_PSY_A_CORE
high risk for assault

## 2024-02-29 NOTE — BH INPATIENT PSYCHIATRY PROGRESS NOTE - NSTXIMPULSPROGRES_PSY_ALL_CORE
No Change
Met - goal discontinued
Improving
Met - goal discontinued
Met - goal discontinued
Improving
Met - goal discontinued
No Change
Improving
Improving
No Change
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
## 2024-02-29 NOTE — BH INPATIENT PSYCHIATRY PROGRESS NOTE - NSBHFUPINTERVALCCFT_PSY_A_CORE
follow up for psychosis/agitation

## 2024-02-29 NOTE — BH INPATIENT PSYCHIATRY PROGRESS NOTE - NSBHMSETHTPROC_PSY_A_CORE
at times linear and at times disorganized/Disorganized/Impaired reasoning

## 2024-02-29 NOTE — BH DISCHARGE NOTE NURSING/SOCIAL WORK/PSYCH REHAB - NSCDUDCCRISIS_PSY_A_CORE
[facility] Well  1 (100) [facility]-WELL ([phone])  Text "WELL" to 30670  Website: www.[employer]/.National Suicide Prevention Lifeline 1 (342) 615-7434/.  [employer]  1 (043) LIFENET (218-5538)/.  [facility]  [address] [place] [phone]   Hours:  Monday through Friday from 9 AM to 3 PM

## 2024-02-29 NOTE — BH INPATIENT PSYCHIATRY PROGRESS NOTE - NSBHATTESTTYPEVISIT_PSY_A_CORE
On-site Attending supervising JORGE L (99XXX codes)

## 2024-02-29 NOTE — BH INPATIENT PSYCHIATRY PROGRESS NOTE - NSTXDCOPNOPROGRES_PSY_ALL_CORE
No Change
Improving
No Change
Improving
No Change
Improving
No Change
No Change

## 2024-02-29 NOTE — BH INPATIENT PSYCHIATRY PROGRESS NOTE - NSBHATTESTBILLING_PSY_A_CORE
82429-Kbxdopfopx OBS or IP - moderate complexity OR 35-49 mins
93944-Rwwlkijyoe OBS or IP - moderate complexity OR 35-49 mins
77407-Zsvitpihus OBS or IP - moderate complexity OR 35-49 mins
25441-Cmedfxwgmv OBS or IP - moderate complexity OR 35-49 mins
03392-Rtunqoxkbw OBS or IP - moderate complexity OR 35-49 mins
75662-Loevqluoda OBS or IP - moderate complexity OR 35-49 mins
77251-Dwvdqlwpcj OBS or IP - moderate complexity OR 35-49 mins
52111-Iuidbgeepe OBS or IP - moderate complexity OR 35-49 mins
45473-Ohjoaxnlmd OBS or IP - moderate complexity OR 35-49 mins
30867-Lcfdifhkse OBS or IP - moderate complexity OR 35-49 mins
63767-Prvobjfoud OBS or IP - moderate complexity OR 35-49 mins
49172-Yozdhqrsix OBS or IP - moderate complexity OR 35-49 mins
45841-Tzxrcgrxgm OBS or IP - moderate complexity OR 35-49 mins

## 2024-02-29 NOTE — BH PSYCHOLOGY - GROUP THERAPY NOTE - NSPSYCHOLGRPCOGPT_PSY_A_CORE FT
Patient attended Cognitive Behavioral Therapy Group utilizing concepts and skills from Acceptance and Commitment Therapy (ACT). The group started with a brief check in where patients were asked to share their favorite memory or time in their life. The group then focused on the topic of acceptance. Patients discussed being present and looking forward instead of focusing on the past. The group then focused on taking care of your body to take care of your mind. Patients also shared examples of ways they take care of themselves. The  explained concepts, reinforced participation, and engaged patients in the discussion.
Patient attended Cognitive Behavioral Therapy Group utilizing concepts and skills from Acceptance and Commitment Therapy (ACT). The group started with a brief check in and mindfulness /relaxation exercise. The group then focused on the topic of coping skills, distress tolerance and self care. Patients discussed acceptance of emotional experience and the concept of distress tolerance. Patients shared examples of healthy ways to cope with distress and ways to engage in healthy self-care. Patients also explored the topic of mental health stigma. The  explained concepts, reinforced participation, and engaged patients in the discussion.  
Patient attended Cognitive Behavioral Therapy Group utilizing concepts and skills from Acceptance and Commitment Therapy (ACT). The group started with a brief check in and mindfulness/relaxation exercise. The group then focused on the topic of radical acceptance and de-stressing. Patients discussed ways in which they have struggled and tried to repress their negative emotions and thoughts. Pt's also spoke about ways in which they have tried to accept their emotions/thoughts and how they have de-stressed in a positive way. The  and co-leader explained concepts, reinforced participation, and engaged patients in the discussion. 
Patient attended Cognitive Behavioral Therapy Group utilizing concepts and skills from Acceptance and Commitment Therapy (ACT). The group started with a brief check in where patients were asked to share a aminata and a thorn from the weekend. The group then focused on the topic of diffusion. Patients discussed ways to cope with negative emotions. Patients also shared examples of ways they take care of themselves today The  explained concepts, reinforced participation, and engaged patients in the discussion.

## 2024-02-29 NOTE — BH PSYCHOLOGY - GROUP THERAPY NOTE - NSPSYCHOLGRPCOGPT_PSY_A_CORE
other...
participated in exercise/shared positive coping experience/other...

## 2024-02-29 NOTE — BH INPATIENT PSYCHIATRY PROGRESS NOTE - NSTXPSYCHODATEEST_PSY_ALL_CORE
11-Feb-2024

## 2024-02-29 NOTE — BH INPATIENT PSYCHIATRY PROGRESS NOTE - NSBHMSETHTCONTENT_PSY_A_CORE
Delusions/Other

## 2024-02-29 NOTE — BH INPATIENT PSYCHIATRY PROGRESS NOTE - NSBHCONSBHPROVCNTCTNOFT_PSY_A_CORE
defer to primary team

## 2024-02-29 NOTE — BH PSYCHOLOGY - GROUP THERAPY NOTE - NSBHPSYCHOLRESPCOMMENT_PSY_A_CORE FT
Patient discussed how he stays calm. Patient discussed thinking things through before acting 
Pt reported that he enjoys talking as a way to de-stress
Patient discussed his favorite food was pasta with akash sauce and shrimp.

## 2024-02-29 NOTE — BH PSYCHOLOGY - GROUP THERAPY NOTE - NSBHPSYCHOLPARTICIPCOMMENT_PSY_A_CORE FT
Pt actively and appropriately participated.  

## 2024-02-29 NOTE — BH PSYCHOLOGY - GROUP THERAPY NOTE - NSPSYCHOLGRPCOGINT_PSY_A_CORE
group members provided support/group members suggested positive behaviors/other..
other..

## 2024-02-29 NOTE — BH INPATIENT PSYCHIATRY PROGRESS NOTE - NSBHFUPINTERVALHXFT_PSY_A_CORE
Patient is followed up for psychosis/agitation. Chart, medications and labs reviewed. Patient is discussed during morning brief, no overnight events, remains in tenuous behavioral control.  Patient was seen and is evaluated on the unit. He reports having good sleep and appetite. Pt states that he was able to meet with his father yesterday during visiting and "worked things out". States that his father was unable to tell him why he called the ambulance. He expresses that his parents are working together to get him on medications, when he believes that he does not need them. He has been compliant with standing medications for past few days. He denies AVH, remains suspicious and labile. Pt currently denies SHIIP. No acute medical concerns, VSS.
Patient is followed up for psychosis/agitation. Chart, medications and labs reviewed. Patient is discussed during morning brief, no overnight events, has been in fair behavioral control.  Patient was seen and is evaluated on the unit. He is cooperative, not irritable on interview this morning. Pt reports having fair sleep and appetite, does express some difficulty falling asleep and received PRN's. He is agreeable to taking melatonin as a sleep aid. Writer discussed AOT with pt's parents who are both not in favor of treatment team pursuing AOT, parents express willingness to support pt's treatment in the community. Pt expresses that he "has a problem", some hesitancy to recognize this as psychiatric disorder but willing to take Invega [place]. Order placed for 1st loading dose of Invega Sustenna 234mg for 2/23. He has been compliant with standing medications, denies SE. Pt remains labile, although in fairly improved control. Denies AVH. Pt currently denies SHIIP. No acute medical concerns, VSS.
Patient is followed up for psychosis/agitation. Chart, medications and labs reviewed. Patient is discussed during morning brief, no overnight events, has been in fair behavioral control.  Patient was seen and is evaluated on the unit. Pt reports having fair sleep and appetite. He states that he is "chilling" and listening to music, does not voice any concerns or complaints. Writer discussed discharge planning with pt. He was able to receive 1st loading dose of Invega Sustenna 234mg today. He has been compliant with standing medications, denies SE. Denies AVH or delusional thoughts. Pt currently denies SHIIP. No acute medical concerns, VSS.
Patient is followed up for psychosis/agitation. Chart, medications and labs reviewed. Patient is discussed during morning brief, no overnight events, has been in improved behavioral control.  Patient was seen and is evaluated on the unit. He reports good sleep and appetite, did receive PRN's overnight for sleep. Pt ambivalent about AOT, states "I do have a problem" but would rather comply with treatment on own than be court-ordered. He asks for writer to speak with his mother. He has been compliant with standing medications, denies SE. He denies AVH, remains suspicious, labile and at times verbally abusive on the phone and during visiting with his father. Pt currently denies SHIIP. No acute medical concerns, VSS.
Patient is followed up for psychosis/agitation. Chart, medications and labs reviewed. Patient is discussed during morning brief, no overnight events, has been in fair behavioral control.  Patient was seen and is evaluated on the unit. He reports good sleep and appetite yesterday. As per staff pt remains argumentative and is splitting staff, was found smoking inside his room with another peer yesterday, visitation suspended and pt placed in hospital gowns. Pt verbalizes that he does not have mental illness, but willing to follow up at [facility]"" for treatment. He expresses not wanting to be placed on AOT. He has been compliant with standing medications, denies SE. He denies AVH, remains labile and at times verbally abusive on the phone and during visiting with his father. Pt currently denies SHIIP. No acute medical concerns, VSS.
Patient is followed up for psychosis/agitation. Chart, medications and labs reviewed. Patient is discussed during morning brief, no overnight events, received IM medications per request for sleep overnight, remains in tenuous behavioral control.  Patient was seen and is evaluated on the unit. He remains superficially cooperative, irritable at times. Pt this morning verbalizes that he is "good", minimally engaging with interview and focused on receiving his cellphone to "pay bills". As per staff pt has been argumentative and is splitting staff. He was able to accept standing medications overnight and today morning. He denies AVH, appears suspicious and remains labile. Pt currently denies SHIIP. No acute medical concerns, VSS.
Patient is followed up for psychosis/agitation. Chart, medications and labs reviewed. Patient is discussed during morning brief, no overnight events, he has been in fair behavioral control.  Patient was seen and is evaluated on the unit. Pt reports having good sleep and appetite. He states that he has been "resting" and wishes to avoid conflict with others to avoid delaying his discharge. Pt inquires about obtaining prescription for medical marijuana. He was able to receive 1st loading dose of Invega Sustenna 234mg on 2/23, scheduled for 2nd dose on 3/1. He has been compliant with standing medications, denies SE. Denies AVH or delusional thoughts. Pt currently denies SHIIP. No acute medical concerns, VSS.
Patient is followed up for psychosis/agitation. Chart, medications and labs reviewed. Patient is discussed during morning brief, no overnight events, has been in fair behavioral control.  Patient was seen and is evaluated on the unit. He is irritable and superficially cooperative with interview, prior to speaking with writer was heard yelling at his father on the phone. Pt continues to express not wanting to be on AOT, threatens litigation against writer if AOT is pursued. He claims that writer will "manipulate" his mother into thinking that he needs AOT. Continues to exhibit poor insight into his condition. He has been compliant with standing medications, denies SE. Pt remains labile, paranoid, and is verbally abusive at times towards staff and his father. Pt currently denies SHIIP. No acute medical concerns, VSS.
Patient is followed up for psychosis/agitation. Chart, medications and labs reviewed. Patient is discussed during morning brief, no overnight events, has been in fair behavioral control.  Patient was seen and is evaluated on the unit. Pt reports having fair sleep and appetite. He states that he is "good", continues to express having a "problem" and is ambivalent in accepting diagnosis. States that he is willing to take monthly injection outside the hospital. He was able to receive 1st loading dose of Invega Sustenna 234mg on 2/23. He has been compliant with standing medications, denies SE. Denies AVH or delusional thoughts. Pt currently denies SHIIP. No acute medical concerns, VSS.
Patient is followed up for psychosis/agitation. Chart, medications and labs reviewed. Patient is discussed during morning brief, no overnight events, has been in improved behavioral control.  Patient was seen and is evaluated on the unit. He pleasantly greets writer this morning when approached for interview. Writer discussed that treatment team is pursuing AOT for pt. He states that he can comply with treatment on his own, but expresses understanding of team pursuing AOT. Pt verbalizes that he was previously on AOT for 2.5 years and had "graduated" last year. He has been compliant with standing medications, denies SE. He denies AVH, remains labile and at times verbally abusive on the phone with his father. Pt currently denies SHIIP. No acute medical concerns, VSS.
Patient is followed up for psychosis/agitation. Chart, medications and labs reviewed. Patient is discussed during morning brief, no overnight events, remains in tenuous behavioral control.  Patient was seen and is evaluated on the unit. He is guarded and superficially cooperative with interview. Pt states that he lives with his father who called police to bring him to the hospital. He verbalizes that he was awake at 4am, was going to smoke marijuana and had asked his father about his headphones. Goes on to state that police showed up at the house and told him that he needed to go to the hospital as he was "off medications". Pt denies being aggressive at home or being involved in any verbal arguments with his father. States that he listens to his father because he is "the man of the house". He denies having any mental illness or needing to take medications. He has been noncompliant with standing medications on the unit. He denies AVH, suspicious and expressed grandiose thoughts. Pt currently denies SHIIP. No acute medical concerns, VSS.
Patient is followed up for psychosis/agitation. Chart, medications and labs reviewed. Patient is discussed during morning brief, no overnight events, he has been in fair behavioral control.  Patient was seen and is evaluated on the unit. Pt reports having good sleep and appetite. He states that he was upset after speaking to his father on the phone this morning. He says that he tore a pair of slippers, father forgot to glue the slipper and has "bad memory". He was able to receive 1st loading dose of Invega Sustenna 234mg on 2/23, scheduled for 2nd dose on 3/1. He has been compliant with standing medications, denies SE. Denies AVH or delusional thoughts. Pt currently denies SHIIP. He was able to have labs drawn today AM. No acute medical concerns, VSS.
Patient is followed up for psychosis/agitation. Chart, medications and labs reviewed. Patient is discussed during morning brief, pt was involved in a verbal argument with father yesterday during visiting and afterwards revoked consent for treatment team to contact family, he has been in fair behavioral control.  Patient was seen and is evaluated on the unit. Pt reports having fair sleep and appetite. He states that he got into an argument with his father yesterday regarding cannabis use. States that he his father sends him to the hospital to "detox" off from cannabis and quit, although pt wishes to continue smoking and does not view his cannabis use as problematic. He was able to receive 1st loading dose of Invega Sustenna 234mg on 2/23, scheduled for 2nd dose on 3/1. He has been compliant with standing medications, denies SE. Denies AVH or delusional thoughts. Pt currently denies SHIIP. No acute medical concerns, VSS.

## 2024-02-29 NOTE — BH DISCHARGE NOTE NURSING/SOCIAL WORK/PSYCH REHAB - NSDCPRGOAL_PSY_ALL_CORE
Writer met with patient in order to discuss progress towards goal upon discharge. Pt completed goal, as patient identified listening to music, talking to friends, and walking away, as positive coping skills. Writer provided support and encouraged patient to continue utilizing coping skills upon discharge.  Pt was receptive.     Initially, pt was loud and verbally aggressive, however upon discharge, patient is in behavioral control. Pt verbalized intent to follow up with treatment. Patient and writer reviewed safety plan. Patient denies SI     Patient attended approximately 70% of psych rehab groups. Patient was verbally engaged, however unable to tolerate session structure.

## 2024-02-29 NOTE — BH INPATIENT PSYCHIATRY PROGRESS NOTE - NSBHMETABOLIC_PSY_ALL_CORE_FT
BMI: BMI (kg/m2): 27.8 (02-10-24 @ 05:52)  HbA1c: A1C with Estimated Average Glucose Result: 5.6 % (02-26-24 @ 09:05)    Glucose:   BP: --Vital Signs Last 24 Hrs  T(C): 36.4 (02-29-24 @ 09:01), Max: 36.6 (02-28-24 @ 19:17)  T(F): 97.6 (02-29-24 @ 09:01), Max: 97.9 (02-28-24 @ 19:17)  HR: --  BP: --  BP(mean): --  RR: 18 (02-28-24 @ 21:32) (18 - 18)  SpO2: --    Orthostatic VS  02-29-24 @ 09:01  Lying BP: --/-- HR: --  Sitting BP: 123/76 HR: 95  Standing BP: 98/73 HR: 108  Site: --  Mode: --  Orthostatic VS  02-28-24 @ 19:17  Lying BP: --/-- HR: --  Sitting BP: 119/69 HR: 107  Standing BP: 116/66 HR: 105  Site: --  Mode: --  Orthostatic VS  02-27-24 @ 19:15  Lying BP: --/-- HR: --  Sitting BP: 94/66 HR: 118  Standing BP: 99/61 HR: 123  Site: --  Mode: --    Lipid Panel: Date/Time: 02-26-24 @ 09:05  Cholesterol, Serum: 149  LDL Cholesterol Calculated: 68  HDL Cholesterol, Serum: 71  Total Cholesterol/HDL Ration Measurement: --  Triglycerides, Serum: 52

## 2024-02-29 NOTE — BH INPATIENT PSYCHIATRY PROGRESS NOTE - NSBHATTESTATTENDBILLTIME_PSY_A_CORE
I independently performed the documented

## 2024-02-29 NOTE — BH DISCHARGE NOTE NURSING/SOCIAL WORK/PSYCH REHAB - PATIENT PORTAL LINK FT
You can access the FollowMyHealth Patient Portal offered by [facility] by registering at the following website: [email or web address]. By joining [employer]’s FollowMyHealth portal, you will also be able to view your health information using other applications (apps) compatible with our system.

## 2024-02-29 NOTE — BH DISCHARGE NOTE NURSING/SOCIAL WORK/PSYCH REHAB - DISCHARGE INSTRUCTIONS AFTERCARE APPOINTMENTS
In order to check the location, date, or time of your aftercare appointment, please refer to your Discharge Instructions Document given to you upon leaving the hospital.  If you have lost the instructions please call [phone]

## 2024-02-29 NOTE — BH PSYCHOLOGY - GROUP THERAPY NOTE - NSPSYCHOLGRPCOGINT_PSY_A_CORE FT
Cognitive/behavioral therapy, Emotion regulation/coping skills taught, Psychoeducation, Acceptance and Commitment Therapy (ACT)  
abdominal pain

## 2024-02-29 NOTE — BH INPATIENT PSYCHIATRY PROGRESS NOTE - NSBHCHARTREVIEWVS_PSY_A_CORE FT
Vital Signs Last 24 Hrs  T(C): 36.4 (02-29-24 @ 09:01), Max: 36.6 (02-28-24 @ 19:17)  T(F): 97.6 (02-29-24 @ 09:01), Max: 97.9 (02-28-24 @ 19:17)  HR: --  BP: --  BP(mean): --  RR: 18 (02-28-24 @ 21:32) (18 - 18)  SpO2: --    Orthostatic VS  02-29-24 @ 09:01  Lying BP: --/-- HR: --  Sitting BP: 123/76 HR: 95  Standing BP: 98/73 HR: 108  Site: --  Mode: --  Orthostatic VS  02-28-24 @ 19:17  Lying BP: --/-- HR: --  Sitting BP: 119/69 HR: 107  Standing BP: 116/66 HR: 105  Site: --  Mode: --  Orthostatic VS  02-27-24 @ 19:15  Lying BP: --/-- HR: --  Sitting BP: 94/66 HR: 118  Standing BP: 99/61 HR: 123  Site: --  Mode: --

## 2024-02-29 NOTE — BH INPATIENT PSYCHIATRY PROGRESS NOTE - NSBHMSEAFFQUAL_PSY_A_CORE
Irritable

## 2024-02-29 NOTE — BH INPATIENT PSYCHIATRY PROGRESS NOTE - NSTXDCOPNODATEEST_PSY_ALL_CORE
12-Feb-2024
12-Feb-2024
26-Feb-2024
26-Feb-2024
12-Feb-2024
20-Feb-2024
26-Feb-2024
26-Feb-2024
12-Feb-2024
12-Feb-2024

## 2024-02-29 NOTE — BH INPATIENT PSYCHIATRY PROGRESS NOTE - NSTXIMPULSDATETRGT_PSY_ALL_CORE
18-Feb-2024
18-Feb-2024
25-Feb-2024
28-Feb-2024
25-Feb-2024
28-Feb-2024
21-Feb-2024
25-Feb-2024
21-Feb-2024
25-Feb-2024
18-Feb-2024
25-Feb-2024
25-Feb-2024

## 2024-02-29 NOTE — BH PSYCHOLOGY - GROUP THERAPY NOTE - NSBHPSYCHOLGRPNUM_PSY_A_CORE
5
If you are a smoker, it is important for your health to stop smoking. Please be aware that second hand smoke is also harmful.
7
8
8

## 2024-03-01 VITALS — RESPIRATION RATE: 17 BRPM | TEMPERATURE: 97 F

## 2024-03-01 PROCEDURE — 99238 HOSP IP/OBS DSCHRG MGMT 30/<: CPT

## 2024-03-01 RX ADMIN — RISPERIDONE 2 MILLIGRAM(S): 4 TABLET ORAL at 08:55

## 2024-03-01 RX ADMIN — PALIPERIDONE 156 MILLIGRAM(S): 1.5 TABLET, EXTENDED RELEASE ORAL at 09:47

## 2024-03-01 NOTE — BH INPATIENT PSYCHIATRY DISCHARGE NOTE - HOSPITAL COURSE
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, non-caregiver, with PPHx of schizoaffective disorder bipolar type, and cannabis abuse, multiple past psych hospitalizations, most recently [facility] 12/11/23 and [facility] 11/29/23, previous on AOT (stopped June 2023), chronic history of medication noncompliance, was on Invega Sustenna until June 2023; discharged from [facility] on Invega Sustenna 234mg and 156mg on 12/6/23 and 12/12/23, no history of self-injurious behavior or suicide attempts, documented history of property destruction & aggression/violence, history of probation for reckless driving approximately 5-6 years ago and has history of arrests for stealing and reynoso larceny, h/o daily marijuana use, brought in by EMS, activated by father, agitation and altercation at home in the context of treatment noncompliance, brought in to ED in handcuffs.  On initial evaluation, pt is guarded and superficially cooperative with interview. Pt states that he lives with his father who called police to bring him to the hospital. He verbalizes that he was awake at 4am, was going to smoke marijuana and had asked his father about his headphones. Goes on to state that police showed up at the house and told him that he needed to go to the hospital as he was "off medications". Pt denies being aggressive at home or being involved in any verbal arguments with his father. States that he listens to his father because he is "the man of the house". He denies having any mental illness or needing to take medications. He has been noncompliant with standing medications on the unit. He denies AVH, suspicious and expressed grandiose thoughts.  During hospitalization pt was restarted on Risperidone 1mg HS for psychosis/mood. He was initially noncompliant with medications, stating that he does not have any mental illness requiring treatment. During first day of hospitalization, pt was physically threatening towards peers and staff, was given IM medications and placed in seclusion. He was noted to be very irritable and verbally abusive when speaking to his father over the phone and during visiting hours. Pt eventually agreed to take medications with encouragement.  Treatment team discussed AOT with pt's family, who did not want pt to be placed back on AOT. Pt's mother & father insisting that they would be able to take pt to his follow up appt. They verbalize that pt previously did well while on WASHINGTON and w/o PO medications. Discussed plan with pt and family to restart pt back on WASHINGTON of Invega Sustenna and pt was agreeable.  Dose of Risperidone was titrated up to 2mg BID for therapeutic effect, pt has been in much better behavioral control. He has not required any additional IM medications for agitation or aggression on the unit. He remains ambivalent about his diagnosis, although willing to accept that he has a "problem" and continue treatment with WASHINGTON in the community. Pt was able to receive 1st loading dose of Invega Sustenna 234mg on 2/23 and received 2nd loading dose of 156mg on 3/1. Pt at this time no longer meets criteria for continued inpatient hospitalization and can safely be discharged to the community. On day of discharge, pt adamantly denies SHIIP. Denies AVH or delusional thinking. He denies mood symptoms or anxiety. Upon discharge pt is provided with outpatient appt and is scheduled to receive next dose of Invega Sustenna 234mg on 3/29.  Patient was provided with extensive psychoeducation on treatment options and motivational counseling targeting healthy lifestyle. Patient was instructed on actions for crisis situations, understood and agreed to follow instructions for handling crisis, including coming to ER or calling 911 should the patient or their family feel that they are in danger of hurting self or others. Patient also was given Suicide Prevention Lifeline number 1-125.125.9650 and provided with instructions on its use.

## 2024-03-01 NOTE — BH INPATIENT PSYCHIATRY DISCHARGE NOTE - NSDCCPCAREPLAN_GEN_ALL_CORE_FT
PRINCIPAL DISCHARGE DIAGNOSIS  Diagnosis: Schizoaffective disorder, bipolar type  Assessment and Plan of Treatment:       SECONDARY DISCHARGE DIAGNOSES  Diagnosis: Cannabis use disorder  Assessment and Plan of Treatment:

## 2024-03-01 NOTE — BH INPATIENT PSYCHIATRY DISCHARGE NOTE - NSBHFUPINTERVALHXFT_PSY_A_CORE
... Patient is followed up for psychosis. Chart, medications and labs reviewed. Patient is discussed during morning brief, no overnight events, he has been in fair behavioral control.  Patient was seen and is evaluated on the unit. Pt reports having good sleep and appetite. 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 Pt verbalizes that he plans on going to [place] to  his cousin who will be staying with him for the weekend. He was able to receive 1st loading dose of Invega Sustenna 234mg on 2/23 and received 2nd dose this morning. He has been compliant with standing medications, denies SE. Denies AVH or delusional thoughts. Pt currently denies SHIIP. No acute medical concerns, VSS. Pt to be discharged today to home, given outpatient appt., due for next injection on 3/29.

## 2024-03-01 NOTE — BH INPATIENT PSYCHIATRY DISCHARGE NOTE - NSBHMETABOLIC_PSY_ALL_CORE_FT
BMI: BMI (kg/m2): 27.8 (02-10-24 @ 05:52)  HbA1c: A1C with Estimated Average Glucose Result: 5.6 % (02-26-24 @ 09:05)    Glucose:   BP: --Vital Signs Last 24 Hrs  T(C): 36.1 (03-01-24 @ 08:45), Max: 36.7 (02-29-24 @ 19:13)  T(F): 97 (03-01-24 @ 08:45), Max: 98.1 (02-29-24 @ 19:13)  HR: --  BP: --  BP(mean): --  RR: --  SpO2: --    Orthostatic VS  03-01-24 @ 08:45  Lying BP: --/-- HR: --  Sitting BP: 114/70 HR: 80  Standing BP: 124/68 HR: 74  Site: --  Mode: --  Orthostatic VS  02-29-24 @ 19:13  Lying BP: --/-- HR: --  Sitting BP: 120/76 HR: 82  Standing BP: 121/70 HR: 85  Site: --  Mode: --  Orthostatic VS  02-29-24 @ 09:01  Lying BP: --/-- HR: --  Sitting BP: 123/76 HR: 95  Standing BP: 98/73 HR: 108  Site: --  Mode: --  Orthostatic VS  02-28-24 @ 19:17  Lying BP: --/-- HR: --  Sitting BP: 119/69 HR: 107  Standing BP: 116/66 HR: 105  Site: --  Mode: --    Lipid Panel: Date/Time: 02-26-24 @ 09:05  Cholesterol, Serum: 149  LDL Cholesterol Calculated: 68  HDL Cholesterol, Serum: 71  Total Cholesterol/HDL Ration Measurement: --  Triglycerides, Serum: 52

## 2024-03-01 NOTE — BH INPATIENT PSYCHIATRY DISCHARGE NOTE - HPI (INCLUDE ILLNESS QUALITY, SEVERITY, DURATION, TIMING, CONTEXT, MODIFYING FACTORS, ASSOCIATED SIGNS AND SYMPTOMS)
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, non-caregiver, with PPHx of Schizoaffective disorder bipolar type, and cannabis abuse, multiple past psych hospitalizations, most recently [facility] 12/11/23 and [facility] 11/29/23, previous on AOT (stopped June 2023), chronic history of medication noncompliance, was on Invega Sustenna until June 2023; discharged from [facility] on Invega Sustenna 234mg and 156mg on 12/6/23 and 12/12/23, no history of self-injurious behavior or suicide attempts, documented history of property destruction & aggression/violence, history of probation for reckless driving approximately 5-6 years ago and has history of arrests for stealing and reynoso larceny, h/o daily marijuana use, brought in by EMS, activated by father, agitation and altercation at home in the context of treatment noncompliance, brought in to ED in handcuffs.    On exam, patient superficially cooperative and slightly irritable, some noticeable grandiosity throughout interview, thought content is at times illogical. Pt states that he does not know why he is here today, stating that he woke up at 4am this morning, wanted to find his headphones, used Cannibis, asked his father if he knew where the headphones are at, went outside to try to find them, and then came back to his room and the police came in the room. Pt denies any altercation. Cannot provide history about why he was handcuffed when brought to the ED. Pt states that police and mental health workers are a "conspiracy" and says that he is going to write a book and get a [employer] for his work. Pt does not think he needs any medications currently, denies mental health conditions. Patient denies active/passive suicidal/homicidal ideation, plan or intent. He denies AH/VH, but at times seemed internally preoccupied. Pt says that he has been sleeping from 8pm-2/3am recently, endorses higher energy. Pt denies depressed mood, says his mood is "amazing." Denies any legal issues. Endorses using marijuana daily but denies all other substance use. Pt stands up, starts to walk out of room, says that he is doing "great" and to "call his father" so that he can be discharged.     Spoke to father, Jerry at [phone]. States that for the last month, pt's behavior has been erratic, aggressive, and disrespectful. Says that pt has been not adherent with his medications since his discharge from [facility]. Last night, he woke up and was raising his voice, agitated, was worried that he may escalate and become physically aggressive. He was not cooperative with police officers when they came, was aggressive, and was handcuffed due to this. Requesting that he is psychiatrically hospitalized and asking for him to have AOT again as he functioned well on AOT."    On the unit, patient is minimally cooperative and in tenuous behavioral control. Patient required emergent IM medications last night. Patient denies any reason for admission and has been refusing medication. Patient appears paranoid, labile and guarded. Denies any AVH or SI/HI. No CO needed at this time. Risperdal changed to 1mg BID and patient encouraged to comply.

## 2024-03-01 NOTE — BH INPATIENT PSYCHIATRY DISCHARGE NOTE - NSBHASSESSSUMMFT_PSY_ALL_CORE
... 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, non-caregiver, with PPHx of Schizoaffective disorder bipolar type, and cannabis abuse, multiple past psych hospitalizations, most recently [facility] 12/11/23 and [facility] 11/29/23, previous on AOT (stopped June 2023), chronic history of medication noncompliance, was on Invega Sustenna until June 2023; discharged from [facility] on Invega Sustenna 234mg and 156mg on 12/6/23 and 12/12/23, no history of self-injurious behavior or suicide attempts, documented history of property destruction & aggression/violence, history of probation for reckless driving approximately 5-6 years ago and has history of arrests for stealing and reynoso larceny, h/o daily marijuana use, brought in by EMS, activated by father, agitation and altercation at home in the context of treatment noncompliance, brought in to ED in handcuffs.  On exam, patient noted irritable, his thought process is disorganized, thought content is at times illogical. Patient noted to become slightly agitated during assessment, denying all events leading to coming to the ED. Per father, pt has been agitated and aggressive at home, has been med nonadherent, needed handcuffed when police came today due to aggression, advocating for his admission and for a future AOT order.  Patient with significant history of violence, and currently not in treatment. Patient is at elevated risk for harm to others due to worsening manic symptoms and poor impulse control. Patient will be admitted involuntarily on a 9.27.    3/1: On the unit, patient is in improved behavioral control. He was able to receive 2nd loading dose of Invega Sustenna 156mg this morning. Denies AVH or delusions. Denies SHIIP. Pt being discharged today to home w/ outpatient follow up, next WASHINGTON due on 3/29.    Plan:  Discharge patient    Psychiatric Meds:  Invega Sustenna 156mg IM, once for psychosis (2nd loading dose given 3/1)   Continue Invega Sustenna 234mg IM, [id number] (next dose due on 3/29)        Dispo: Home

## 2024-03-01 NOTE — BH INPATIENT PSYCHIATRY DISCHARGE NOTE - ATTENDING DISCHARGE PHYSICAL EXAMINATION:
The patient has continued to show improvement in symptoms.  He has been less irritable, behaviorally in control on the unit.  He accepted WASHINGTON Invega x 2, tolerated well.  He denies any depression, anxiety, or SI.  The patient has been sleeping well.  The patient has been engaged in treatment on the unit, is compliant with medications, and is looking forward to continuing care as an outpatient.  He understands the need to remain compliant with medication.  The patient has support from his family, who are also protective factors for him.  He was able to safety plan appropriately.  The patient’s risk cannot be further decreased with continued inpatient hospitalization.  He is no longer an acute danger to himself or others, and is stable for discharge home.

## 2024-03-01 NOTE — BH INPATIENT PSYCHIATRY DISCHARGE NOTE - OTHER PAST PSYCHIATRIC HISTORY (INCLUDE DETAILS REGARDING ONSET, COURSE OF ILLNESS, INPATIENT/OUTPATIENT TREATMENT)
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, non-caregiver, with PPHx of Schizoaffective disorder bipolar type, and cannabis abuse, multiple past psych hospitalizations, most recently [facility] 12/11/23 and [facility] 11/29/23, previous on AOT (stopped June 2023), chronic history of medication noncompliance, was on Invega Sustenna until June 2023; discharged from [facility] on Invega Sustenna 234mg and 156mg on 12/6/23 and 12/12/23, no history of self-injurious behavior or suicide attempts, documented history of property destruction & aggression/violence, history of probation for reckless driving approximately 5-6 years ago and has history of arrests for stealing and reynoso larceny, h/o daily marijuana use, brought in by EMS, activated by father, agitation and altercation at home in the context of treatment noncompliance, brought in to ED in handcuffs.    In ED, patient superficially cooperative and slightly irritable, some noticeable grandiosity throughout interview, thought content is at times illogical. Pt states that he does not know why he is here today, stating that he woke up at 4am this morning, wanted to find his headphones, used Cannibis, asked his father if he knew where the headphones are at, went outside to try to find them, and then came back to his room and the police came in the room. Pt denies any altercation. Cannot provide history about why he was handcuffed when brought to the ED. Pt states that police and mental health workers are a "conspiracy" and says that he is going to write a book and get a [employer] for his work. Pt does not think he needs any medications currently, denies mental health conditions. Patient denies active/passive suicidal/homicidal ideation, plan or intent. He denies AH/VH, but at times seemed internally preoccupied. Pt says that he has been sleeping from 8pm-2/3am recently, endorses higher energy. Pt denies depressed mood, says his mood is "amazing." Denies any legal issues. Endorses using marijuana daily but denies all other substance use. Pt stands up, starts to walk out of room, says that he is doing "great" and to "call his father" so that he can be discharged.    Writer met with pt to introduce self and obtain collateral. Pt presents as cooperative and pleasant upon approach. Pt reports that he lives with his mother and father but spends more time with his father as he lives closer to [place] where the pt likes to work. The address of his mother according to pt is [address] [place] [address]. Patient gave consent to speak with both his father Jerry ([phone]) and mother Luz (). Consent form is in the chart. The pt reports that the father might say things about the pt that aren't true as the pt believes that the father has "magical hutchins" and the pt interferes with his fathers hutchins. Pt does not express any concerns living with either parent at this time. As per discharge note from pt's last admission he was connected with outpatient at [address] [place], [place] [address] ().

## 2024-05-02 NOTE — BH INPATIENT PSYCHIATRY PROGRESS NOTE - NSTXDCOPNODATETRGT_PSY_ALL_CORE
Left message informing patient that her [place] paperwork was completed and and a copy was Secure Emailed to [email or web address] I stated that if she would like a copy for her records, she should call the office at [phone].    5/2/2024 at 9:32 AM    
Pt called back, made aware FMLA paperork was submitted. She is declining a copy of it for her records at this time. Patient verbalizes understanding of all of the above, and has no further questions or concerns at this time. Encouraged to call back the office should any questions/concerns arise. 5/2/2024 at 10:53 AM.    
19-Feb-2024
27-Feb-2024
27-Feb-2024
04-Mar-2024
19-Feb-2024
27-Feb-2024
04-Mar-2024
04-Mar-2024
19-Feb-2024
27-Feb-2024
19-Feb-2024
19-Feb-2024
04-Mar-2024

## 2024-05-24 NOTE — ED ADULT NURSE NOTE - CCCP TRG CHIEF CMPLNT
Do not take more than one a day.    Call when you have a flare up for another medication to treat gout.    Patient states that he is feeling some better because he took one and a half tablets of uloric.    He has been advised not to do this.  
How are you taking Uloric?    It should be taken everyday.  
Pt states he takes medication for his gout and it really isn't helping and is asking if it would be ok if he took two a day instead of one.    Please call and advise [phone]  
Pt states that he is using med daily with not much relief.     Can he take more than one daily?   
aggressive behavior

## 2024-07-14 NOTE — ED BEHAVIORAL HEALTH ASSESSMENT NOTE - NS ED BHA REVIEW OF ED CHART AVAILABLE LABS REVIEWED
Continued Stay REUBEN/GILMAR Assessment/Plan of Care Note       Active Substitute Decision Maker (SDM)    There are no active Substitute Decision Maker (SDM) on file.           Progress note:  SW was informed by resident physician, pt would like a [facility] near [place] as this is closer to her niece.  SW met the pt at the bedside and pt endorsed she would like a referral sent to [facility] as she has been there before and it is closer for her niece who lives in [place].  Referral sent.  Will need new PT/OT.  RN updated.      See REUBEN/GILMAR flowsheets for other objective data.    Disposition Recommendations:  Preliminary discharge destination:  [facility]  REUBEN/GILMAR recommendation for discharge: Home therapy      Prior To Hospitalization:    Living Situation: Alone and residing at Senior apartment    .  Support Systems: Family members   Home Devices/Equipment: CPAP/BiPAP machine (T)            Mobility Assist Devices: None   Type of Service Prior to Hospitalization: Transportation services               Patient/Family discharge goal (s):  Home therapy     Resources provided:   Educational/vocational       Prior Function  Bed Mobility: Modified Independent (06/29/24 0927 : Mariah Sawyer, OTR/L)  Transfers: Modified Independent (06/29/24 0927 : Mariah Sawyer, OTR/L)  Ambulation in the Home: Modified  Independent (06/29/24 0927 : Mariah Sawyer, OTR/L)       Current Function  Last Filed Values         Value Time User    Current Function  significantly below baseline level of function 7/8/2024  4:42 PM Monica Olvera, ADAM    Therapy Impairments  activity tolerance; balance; strength; pain 7/8/2024  4:42 PM Monica Olvera, PT    ADLs Requiring Support  bed mobility; transfers; ambulation; stairs 7/8/2024  4:42 PM Monica Olvera, PT            Therapy Recommendations for Discharge:   PT:      Last Filed Values         Value Time User    PT Discharge Needs  therapy 5 or more times per week 7/8/2024  4:42 PM Monica Olvera, PT           OT:       Last Filed Values         Value Time User    OT Discharge Needs  therapy 5 or more times per week 7/8/2024 12:33 PM Amy Grover, OTR/L          SLP:    Last Filed Values       None            Mobility Equipment Recommended for Discharge:        Barriers to Discharge  Identified Barriers to Discharge/Transition Planning: Pending diagnostic results/procedure                   Yes

## 2024-10-01 NOTE — ED BEHAVIORAL HEALTH ASSESSMENT NOTE - CURRENT ACTIVE IDEATION:
Outreach attempt was made to schedule a Medicare Wellness Visit. This was the first attempt. Contact was made, MWV appointment scheduled.  Appointment scheduled for 03/10/2025.    Medicare letter and questionnaire mailed to patient.    
None known

## 2024-12-12 NOTE — ED PROVIDER NOTE - CADM POA PRESS ULCER
Chronic   - will hold home regimen   - LDSS   - Accuchecks   - hypoglycemia protocol   - F/u AM A1c
No

## 2024-12-30 NOTE — ED BEHAVIORAL HEALTH ASSESSMENT NOTE - NS ED BHA ED COURSE PSYCHIATRIC MEDICATION GIVEN
Interval History: Seen on HD.  Has no complaints.  Discussed plan for new IR line placement for HD.  Says he has been walking fine.  Consent for blood.  Hopeful to DC soon with IV abx with HD soon.    Review of Systems   Constitutional:  Positive for fatigue. Negative for chills and fever.   Respiratory:  Negative for cough and shortness of breath.    Cardiovascular:  Negative for chest pain, palpitations and leg swelling.   Gastrointestinal:  Negative for abdominal pain, diarrhea, nausea and vomiting.   Genitourinary:  Negative for dysuria and urgency.   Neurological:  Positive for weakness. Negative for dizziness and headaches.   All other systems reviewed and are negative.    Objective:     Vital Signs (Most Recent):  Temp: (P) 97.7 °F (36.5 °C) (12/30/24 1150)  Pulse: 92 (12/30/24 1215)  Resp: (P) 18 (12/30/24 1150)  BP: (!) 115/59 (12/30/24 1215)  SpO2: 100 % (12/30/24 1215) Vital Signs (24h Range):  Temp:  [97.4 °F (36.3 °C)-97.8 °F (36.6 °C)] (P) 97.7 °F (36.5 °C)  Pulse:  [] 92  Resp:  [17-18] (P) 18  SpO2:  [94 %-100 %] 100 %  BP: (103-143)/(58-83) 115/59     Weight: 56 kg (123 lb 7.3 oz)  Body mass index is 19.93 kg/m².    Intake/Output Summary (Last 24 hours) at 12/30/2024 1224  Last data filed at 12/29/2024 2000  Gross per 24 hour   Intake 600 ml   Output --   Net 600 ml      Physical Exam  Constitutional:       Appearance: He is ill-appearing.   HENT:      Head: Normocephalic and atraumatic.   Neck:      Comments: Right trialysis  Cardiovascular:      Rate and Rhythm: Normal rate and regular rhythm.      Heart sounds: Murmur heard.   Pulmonary:      Effort: Pulmonary effort is normal. No respiratory distress.      Breath sounds: Normal breath sounds. No wheezing or rales.   Abdominal:      General: There is no distension.      Palpations: Abdomen is soft.      Tenderness: There is no abdominal tenderness.   Musculoskeletal:         General: No deformity.      Right lower leg: No edema.      Left  lower leg: No edema.   Skin:     General: Skin is warm and dry.      Coloration: Skin is pale.   Neurological:      General: No focal deficit present.      Mental Status: He is alert and oriented to person, place, and time. Mental status is at baseline.           Significant Labs:  CBC:  Recent Labs   Lab 12/29/24 0227 12/30/24  0553   WBC 4.08 4.59   HGB 7.2* 6.9*   HCT 22.8* 22.1*   * 143*     CMP:  Recent Labs   Lab 12/29/24 0227 12/30/24  0553   * 128*   K 4.1 4.1   CL 99 98   CO2 22* 19*   GLU 79 74   BUN 32* 45*   CREATININE 5.2* 6.2*   CALCIUM 8.8 9.1   PROT 5.7* 5.8*   ALBUMIN 2.3* 2.3*   BILITOT 0.2 0.2   ALKPHOS 343* 325*   AST 13 14   ALT <5* <5*   ANIONGAP 9 11        None

## 2025-01-31 NOTE — ED PROVIDER NOTE - NSTIMEPROVIDERCAREINITIATE_GEN_ER
Date of Procedure: 01/31/25


Description of Procedure(s): 


Procedure performed:





1. Transesophageal Echocardiogram with color flow doppler, pulsed wave doppler 

and continuous wave doppler, (CPT 47420, +74374, +69960)


2. Moderate conscious sedation. Sedation time [20] mins.  (CPT 32155)


3. Bubble Study 





Indications: CVA, mitral valve stenosis





Consent: I have discussed the risks, benefits and alternative therapies for the 

above-mentioned procedure.  The patient has indicated understanding and 

acceptance of the risks of the procedure. Signed consent was obtained and was 

placed in the paper chart.





Procedural Steps: Timeout was performed in usual fashion. Patient's heart rate, 

blood pressure, oxygen saturation and ECG were monitored. Benzocaine was sprayed

 liberally in the back of the throat. Bite block was placed between the jaw. [1]

 mg of Versed and 50 mcg of Fentanyl were administered intravenously. After 

achieving appropriate moderate conscious sedation, YUMIKO probe was advanced 

without difficulty and without any immediate complications to the esophagus. YUMIKO

 study was performed with color flow doppler, pulsed wave doppler and continuous

 wave doppler. The probe was then removed.  Patient tolerated the procedure 

well.  Patient was transferred to the post procedure area in stable and 

satisfactory condition. Throughout the procedure patient's heart rate, blood 

pressure, oxygen saturation and ECG were monitored. 





Total sedation time 10 mins.





Complications: none





FINDINGS





Left Atrium: Moderate to severe left atrial dilatation.  No evidence of thrombus

 or mass seen.  Normal forward flow noticed in pulmonic pain with  S/D ratio >1.


Left Atrial Appendage: Dilated left atrial appendage.  No thrombus noticed in 

left atrial appendage.


Inter atrial septum: Intact interatrial septum.  No evidence of interatrial 

septal aneurysm.  No evidence of PFO or ASD on color flow Doppler.  No evidence 

of right-to-left intracardiac shunting on bubble study.





Left Ventricle: Normal global LV size and systolic function


Right Atrium: Moderate RA dilatation.  No evidence of mass or thrombus.


Right Ventricle: Normal global RV size and systolic function


Aortic Valve: Structurally normal Trileaflet, mild calcific thickening on 

leaflet tips.  No significant stenosis or regurgitation noticed.


Mitral Valve: Mitral annular calcification with bulky calcification involving 

the base of the posterior mitral leaflet.  Normal opening of mitral leaflets.  

Low probability for rheumatic mitral valve disease.  Mean mitral valve pressure 

gradient at heart rate of 70 bpm 2.5 mmHg.  Mitral valve area by planimetry Was 

more than 1.5 cm.


Pulmonic Valve: Not well visualized.


Tricuspid Valve: Structurally normal , with mild tricuspid regurgitation.





Ascending aorta, Aortic root and Aortic arch: Mild intimal thickening and mild 

calcification noticed in ascending aorta.  Normal size ascending aorta and 

aortic root.


Descending aorta: Mild intimal thickening. 


No pericardial effusion





CONCLUSION:


1.  No evidence of thrombus in LA or DONNA. 


2.  No evidence of ASD or PFO on color flow or bubble study.  No evidence of IV 

septum aneurysm


3.  Degenerative mitral valve annular calcification with heavy calcification of 

posterior mitral leaflet base.  No evidence of rheumatic mitral valve disease


4.  Mild degenerative mitral stenosis mean gradient 2.5 mmHg at heart rate 70 

bpm.  MVA by planimetry 1.5 cm


5.  Moderate biatrial dilatation


6.  Normal LV size and systolic function





Matt Mendez MD, RPVI, FACC


Thank you for allowing [facility] to participate in 

this patient's care. Feel free to reach out in case of any followup questions. 03-Dec-2023 10:11

## 2025-05-03 NOTE — ED ADULT TRIAGE NOTE - RESPIRATORY RATE (BREATHS/MIN)
Patient c/o chest pain for the past week that worsened last night while at work. C/o shortness of breath all week. Intermittent dizziness.  
20

## 2025-05-05 NOTE — BH INPATIENT PSYCHIATRY PROGRESS NOTE - OTHER
grandiosity, thinking he is going to get [employer]  Paranoia suspected minimal
